# Patient Record
Sex: MALE | Race: WHITE | Employment: FULL TIME | URBAN - METROPOLITAN AREA
[De-identification: names, ages, dates, MRNs, and addresses within clinical notes are randomized per-mention and may not be internally consistent; named-entity substitution may affect disease eponyms.]

---

## 2017-03-02 RX ORDER — ZOLPIDEM TARTRATE 10 MG/1
TABLET ORAL
Qty: 30 TABLET | Refills: 0 | Status: SHIPPED | OUTPATIENT
Start: 2017-03-02 | End: 2018-11-07

## 2017-04-13 ENCOUNTER — OFFICE VISIT (OUTPATIENT)
Dept: INTERNAL MEDICINE CLINIC | Facility: CLINIC | Age: 42
End: 2017-04-13

## 2017-04-13 VITALS
HEART RATE: 92 BPM | BODY MASS INDEX: 42.42 KG/M2 | SYSTOLIC BLOOD PRESSURE: 184 MMHG | OXYGEN SATURATION: 98 % | TEMPERATURE: 99 F | HEIGHT: 71 IN | DIASTOLIC BLOOD PRESSURE: 88 MMHG | RESPIRATION RATE: 16 BRPM | WEIGHT: 303 LBS

## 2017-04-13 DIAGNOSIS — F41.9 ANXIETY: ICD-10-CM

## 2017-04-13 DIAGNOSIS — I10 ESSENTIAL HYPERTENSION, BENIGN: Primary | ICD-10-CM

## 2017-04-13 DIAGNOSIS — N52.9 ERECTILE DYSFUNCTION, UNSPECIFIED ERECTILE DYSFUNCTION TYPE: ICD-10-CM

## 2017-04-13 DIAGNOSIS — G47.00 INSOMNIA, UNSPECIFIED TYPE: ICD-10-CM

## 2017-04-13 PROBLEM — C62.90 TESTICLE CANCER (HCC): Status: ACTIVE | Noted: 2017-04-13

## 2017-04-13 PROCEDURE — 99214 OFFICE O/P EST MOD 30 MIN: CPT | Performed by: FAMILY MEDICINE

## 2017-04-13 RX ORDER — OLMESARTAN MEDOXOMIL AND HYDROCHLOROTHIAZIDE 20/12.5 20; 12.5 MG/1; MG/1
1 TABLET ORAL
Qty: 30 TABLET | Refills: 2 | Status: SHIPPED | OUTPATIENT
Start: 2017-04-13 | End: 2017-07-06

## 2017-04-13 NOTE — PROGRESS NOTES
HPI:    Patient ID: Bernarda Berumen is a 35year old male.     HPI  Off the diovan hct   1 week d/t ED   The benicar did not have this effect   Stopped d/t cost   alos having issues w anxiety per wife    Short fused    More hyper   Sleep is affected   W (primary encounter diagnosis)  Plan: unsure control   DC diovan hct    Use benicar 20-12.5   Maria Victoria 1 mo    (N52.9) Erectile dysfunction, unspecified erectile dysfunction type  Plan: hopefully gets better w above med changes      (F41.9) Anxiety  Plan: d/w p

## 2017-05-10 ENCOUNTER — OFFICE VISIT (OUTPATIENT)
Dept: INTERNAL MEDICINE CLINIC | Facility: CLINIC | Age: 42
End: 2017-05-10

## 2017-05-10 VITALS
WEIGHT: 304 LBS | TEMPERATURE: 99 F | OXYGEN SATURATION: 97 % | BODY MASS INDEX: 42 KG/M2 | DIASTOLIC BLOOD PRESSURE: 82 MMHG | HEART RATE: 95 BPM | SYSTOLIC BLOOD PRESSURE: 122 MMHG | RESPIRATION RATE: 16 BRPM

## 2017-05-10 DIAGNOSIS — I10 ESSENTIAL HYPERTENSION, BENIGN: Primary | ICD-10-CM

## 2017-05-10 DIAGNOSIS — R06.83 SNORING: ICD-10-CM

## 2017-05-10 PROCEDURE — 99213 OFFICE O/P EST LOW 20 MIN: CPT | Performed by: FAMILY MEDICINE

## 2017-05-10 NOTE — PROGRESS NOTES
HPI:    Patient ID: Joanna Sparks. is a 35year old male. HPI  Joanna Sparks. is a 35year old male. HPI:   Patient presents for recheck of his hypertension.  Pt has been taking medications as instructed, no medication side effects, home BP tunnel syndrome on both sides      hands, R hand repaired through surgery, L hand unrepaired          Past Surgical History    BACK SURGERY  10/11    Comment Lumbar area, treated with needle and heat 10/11    OTHER SURGICAL HISTORY      Comment R carpal, N Take 1 tablet (40 mg total) by mouth every morning before breakfast. Disp: 30 tablet Rfl: 3   Naproxen Sodium (ALEVE) 220 MG Oral Cap Take 220 mg by mouth daily as needed.  Disp:  Rfl:    Testosterone cypionate (DEPOTESTOTERONE) 200 MG/ML Intramuscular Solu

## 2017-07-06 ENCOUNTER — TELEPHONE (OUTPATIENT)
Dept: INTERNAL MEDICINE CLINIC | Facility: CLINIC | Age: 42
End: 2017-07-06

## 2017-07-06 RX ORDER — OLMESARTAN MEDOXOMIL AND HYDROCHLOROTHIAZIDE 20/12.5 20; 12.5 MG/1; MG/1
1 TABLET ORAL
Qty: 90 TABLET | Refills: 0 | Status: SHIPPED | OUTPATIENT
Start: 2017-07-06 | End: 2017-09-30

## 2017-07-06 NOTE — TELEPHONE ENCOUNTER
Needs 90 day supply from mail order for ins to cover    Olmesartan Medoxomil-HCTZ (BENICAR HCT) 20-12.5 MG Oral Tab    Also is going out of town before mail order will arrive - Are samples available?

## 2017-10-04 RX ORDER — OLMESARTAN MEDOXOMIL AND HYDROCHLOROTHIAZIDE 20/12.5 20; 12.5 MG/1; MG/1
TABLET ORAL
Qty: 30 TABLET | Refills: 0 | Status: SHIPPED | OUTPATIENT
Start: 2017-10-04 | End: 2017-10-05 | Stop reason: CLARIF

## 2017-10-05 RX ORDER — OLMESARTAN MEDOXOMIL AND HYDROCHLOROTHIAZIDE 20/12.5 20; 12.5 MG/1; MG/1
1 TABLET ORAL
Qty: 90 TABLET | Refills: 0 | OUTPATIENT
Start: 2017-10-05

## 2017-10-09 ENCOUNTER — HOSPITAL ENCOUNTER (OUTPATIENT)
Dept: GENERAL RADIOLOGY | Age: 42
Discharge: HOME | End: 2017-10-09
Attending: UROLOGY
Payer: COMMERCIAL

## 2017-10-09 DIAGNOSIS — C62.92: ICD-10-CM

## 2017-10-09 PROCEDURE — 71020 XR CHEST PA + LAT CHEST (CPT=71020): CPT | Performed by: UROLOGY

## 2017-10-18 RX ORDER — OLMESARTAN MEDOXOMIL AND HYDROCHLOROTHIAZIDE 20/12.5 20; 12.5 MG/1; MG/1
TABLET ORAL
Qty: 90 TABLET | OUTPATIENT
Start: 2017-10-18

## 2017-10-30 ENCOUNTER — TELEPHONE (OUTPATIENT)
Dept: INTERNAL MEDICINE CLINIC | Facility: CLINIC | Age: 42
End: 2017-10-30

## 2017-10-30 NOTE — TELEPHONE ENCOUNTER
Patient called today to cancel his 3:30 appointment.   Explained our no show policy but since this was his first no show he would not be charged $50

## 2017-11-09 ENCOUNTER — OFFICE VISIT (OUTPATIENT)
Dept: INTERNAL MEDICINE CLINIC | Facility: CLINIC | Age: 42
End: 2017-11-09

## 2017-11-09 VITALS
OXYGEN SATURATION: 98 % | WEIGHT: 306.5 LBS | RESPIRATION RATE: 20 BRPM | TEMPERATURE: 98 F | DIASTOLIC BLOOD PRESSURE: 80 MMHG | HEIGHT: 70 IN | BODY MASS INDEX: 43.88 KG/M2 | HEART RATE: 81 BPM | SYSTOLIC BLOOD PRESSURE: 138 MMHG

## 2017-11-09 DIAGNOSIS — R73.9 ELEVATED BLOOD SUGAR: ICD-10-CM

## 2017-11-09 DIAGNOSIS — K21.9 GASTROESOPHAGEAL REFLUX DISEASE WITHOUT ESOPHAGITIS: ICD-10-CM

## 2017-11-09 DIAGNOSIS — I10 ESSENTIAL HYPERTENSION, BENIGN: Primary | ICD-10-CM

## 2017-11-09 PROCEDURE — 99213 OFFICE O/P EST LOW 20 MIN: CPT | Performed by: FAMILY MEDICINE

## 2017-11-09 RX ORDER — VALSARTAN AND HYDROCHLOROTHIAZIDE 320; 12.5 MG/1; MG/1
1 TABLET, FILM COATED ORAL DAILY
Qty: 90 TABLET | Refills: 1 | Status: SHIPPED | OUTPATIENT
Start: 2017-11-09 | End: 2018-05-09

## 2017-11-09 RX ORDER — PANTOPRAZOLE SODIUM 40 MG/1
40 TABLET, DELAYED RELEASE ORAL
Qty: 90 TABLET | Refills: 1 | Status: SHIPPED | OUTPATIENT
Start: 2017-11-09 | End: 2018-05-14

## 2017-11-09 NOTE — PROGRESS NOTES
HPI:    Patient ID: Ceferino Stratton. is a 38year old male. HPI  Ceferino Stratton. is a 38year old male. HPI:   Patient presents for recheck of his hypertension.  Pt has been taking medications as instructed, no medication side effects, home BP 4/6/2013   • Low testosterone    • Unspecified essential hypertension       Past Surgical History:  10/11: BACK SURGERY      Comment: Lumbar area, treated with needle and heat                10/11  10/2016: ORCHIECTOMY   Left      Comment: Herminio Mixon Pantoprazole Sodium 40 MG Oral Tab EC Take 1 tablet (40 mg total) by mouth every morning before breakfast. Disp: 90 tablet Rfl: 1   ZOLPIDEM TARTRATE 10 MG Oral Tab TAKE 1 TABLET BY MOUTH NIGHTLY AT BEDTIME FOR SLEEP Disp: 30 tablet Rfl: 0   Testosterone

## 2018-03-01 LAB
ALBUMIN/GLOBULIN RATIO: 2.5 (CALC) (ref 1–2.5)
ALBUMIN: 5 G/DL (ref 3.6–5.1)
ALKALINE PHOSPHATASE: 70 U/L (ref 40–115)
ALT: 49 U/L (ref 9–46)
AST: 26 U/L (ref 10–40)
BILIRUBIN, TOTAL: 0.8 MG/DL (ref 0.2–1.2)
BUN: 16 MG/DL (ref 7–25)
CALCIUM: 9.4 MG/DL (ref 8.6–10.3)
CARBON DIOXIDE: 28 MMOL/L (ref 20–31)
CHLORIDE: 101 MMOL/L (ref 98–110)
CHOL/HDLC RATIO: 6.3 (CALC)
CHOLESTEROL, TOTAL: 201 MG/DL
CREATININE: 1.24 MG/DL (ref 0.6–1.35)
EGFR IF AFRICN AM: 81 ML/MIN/1.73M2
EGFR IF NONAFRICN AM: 70 ML/MIN/1.73M2
GLOBULIN: 2 G/DL (CALC) (ref 1.9–3.7)
GLUCOSE: 80 MG/DL (ref 65–99)
HDL CHOLESTEROL: 32 MG/DL
LDL-CHOLESTEROL: 144 MG/DL (CALC)
NON-HDL CHOLESTEROL: 169 MG/DL (CALC)
POTASSIUM: 4.3 MMOL/L (ref 3.5–5.3)
PROTEIN, TOTAL: 7 G/DL (ref 6.1–8.1)
SODIUM: 139 MMOL/L (ref 135–146)
TRIGLYCERIDES: 123 MG/DL

## 2018-04-05 ENCOUNTER — HOSPITAL ENCOUNTER (OUTPATIENT)
Dept: GENERAL RADIOLOGY | Age: 43
Discharge: HOME | End: 2018-04-05
Attending: UROLOGY
Payer: COMMERCIAL

## 2018-04-05 DIAGNOSIS — D29.22: ICD-10-CM

## 2018-04-05 PROCEDURE — 71046 X-RAY EXAM CHEST 2 VIEWS: CPT | Performed by: UROLOGY

## 2018-05-09 ENCOUNTER — OFFICE VISIT (OUTPATIENT)
Dept: INTERNAL MEDICINE CLINIC | Facility: CLINIC | Age: 43
End: 2018-05-09

## 2018-05-09 VITALS
DIASTOLIC BLOOD PRESSURE: 74 MMHG | OXYGEN SATURATION: 99 % | WEIGHT: 269 LBS | BODY MASS INDEX: 39 KG/M2 | TEMPERATURE: 98 F | RESPIRATION RATE: 16 BRPM | SYSTOLIC BLOOD PRESSURE: 122 MMHG | HEART RATE: 98 BPM

## 2018-05-09 DIAGNOSIS — I10 ESSENTIAL HYPERTENSION, BENIGN: Primary | ICD-10-CM

## 2018-05-09 DIAGNOSIS — E66.9 OBESITY (BMI 30-39.9): ICD-10-CM

## 2018-05-09 PROCEDURE — 99213 OFFICE O/P EST LOW 20 MIN: CPT | Performed by: FAMILY MEDICINE

## 2018-05-09 RX ORDER — VALSARTAN AND HYDROCHLOROTHIAZIDE 320; 12.5 MG/1; MG/1
1 TABLET, FILM COATED ORAL DAILY
Qty: 90 TABLET | Refills: 1 | Status: SHIPPED | OUTPATIENT
Start: 2018-05-09 | End: 2018-11-07

## 2018-05-09 NOTE — PROGRESS NOTES
HPI:    Patient ID: Celio Mcneill. is a 38year old male. HPI  Celio Mcneill. is a 38year old male. HPI:   Patient presents for recheck of his hypertension.  Pt has been taking medications as instructed, no medication side effects, no home Diagnosis Date   • Carpal tunnel syndrome on both sides     hands, R hand repaired through surgery, L hand unrepaired   • Cellulitis     arm   • Elevated blood sugar 10/9/2013   • Essential hypertension, benign 4/6/2013   • Low testosterone    • Unspecif Systems         Current Outpatient Prescriptions:  Valsartan-Hydrochlorothiazide 320-12.5 MG Oral Tab Take 1 tablet by mouth daily.  Disp: 90 tablet Rfl: 1   Testosterone cypionate (DEPOTESTOTERONE) 200 MG/ML Intramuscular Solution Inject 200 mg into the mu

## 2018-05-14 RX ORDER — PANTOPRAZOLE SODIUM 40 MG/1
TABLET, DELAYED RELEASE ORAL
Qty: 90 TABLET | Refills: 1 | Status: SHIPPED | OUTPATIENT
Start: 2018-05-14 | End: 2018-11-07

## 2018-10-09 ENCOUNTER — HOSPITAL ENCOUNTER (OUTPATIENT)
Dept: GENERAL RADIOLOGY | Age: 43
Discharge: HOME | End: 2018-10-09
Attending: UROLOGY
Payer: COMMERCIAL

## 2018-10-09 DIAGNOSIS — Z08 ENCOUNTER FOR FOLLOW-UP SURVEILLANCE OF TESTICULAR CANCER: ICD-10-CM

## 2018-10-09 DIAGNOSIS — Z85.47 ENCOUNTER FOR FOLLOW-UP SURVEILLANCE OF TESTICULAR CANCER: ICD-10-CM

## 2018-10-09 PROCEDURE — 71046 X-RAY EXAM CHEST 2 VIEWS: CPT | Performed by: UROLOGY

## 2018-11-05 RX ORDER — VALSARTAN AND HYDROCHLOROTHIAZIDE 320; 12.5 MG/1; MG/1
TABLET, FILM COATED ORAL
Qty: 90 TABLET | Refills: 1 | OUTPATIENT
Start: 2018-11-05

## 2018-11-07 ENCOUNTER — OFFICE VISIT (OUTPATIENT)
Dept: INTERNAL MEDICINE CLINIC | Facility: CLINIC | Age: 43
End: 2018-11-07
Payer: COMMERCIAL

## 2018-11-07 VITALS
HEIGHT: 70 IN | WEIGHT: 256 LBS | TEMPERATURE: 98 F | HEART RATE: 88 BPM | BODY MASS INDEX: 36.65 KG/M2 | SYSTOLIC BLOOD PRESSURE: 110 MMHG | DIASTOLIC BLOOD PRESSURE: 78 MMHG

## 2018-11-07 DIAGNOSIS — K21.9 GASTROESOPHAGEAL REFLUX DISEASE WITHOUT ESOPHAGITIS: ICD-10-CM

## 2018-11-07 DIAGNOSIS — E66.9 OBESITY (BMI 30-39.9): ICD-10-CM

## 2018-11-07 DIAGNOSIS — R73.9 ELEVATED BLOOD SUGAR: ICD-10-CM

## 2018-11-07 DIAGNOSIS — I10 ESSENTIAL HYPERTENSION, BENIGN: Primary | ICD-10-CM

## 2018-11-07 PROCEDURE — 99214 OFFICE O/P EST MOD 30 MIN: CPT | Performed by: FAMILY MEDICINE

## 2018-11-07 RX ORDER — ZOLPIDEM TARTRATE 10 MG/1
TABLET ORAL
Qty: 30 TABLET | Refills: 0 | Status: SHIPPED | OUTPATIENT
Start: 2018-11-07 | End: 2019-05-08

## 2018-11-07 RX ORDER — PANTOPRAZOLE SODIUM 40 MG/1
TABLET, DELAYED RELEASE ORAL
Qty: 90 TABLET | Refills: 1 | Status: SHIPPED | OUTPATIENT
Start: 2018-11-07 | End: 2019-05-09

## 2018-11-07 RX ORDER — OLMESARTAN MEDOXOMIL AND HYDROCHLOROTHIAZIDE 40/25 40; 25 MG/1; MG/1
1 TABLET ORAL DAILY
Qty: 90 TABLET | Refills: 1 | Status: SHIPPED | OUTPATIENT
Start: 2018-11-07 | End: 2019-02-04 | Stop reason: ALTCHOICE

## 2018-11-07 NOTE — PROGRESS NOTES
HPI:    Patient ID: Leanna Recinos. is a 37year old male. HPI  Leanna Recinos. is a 37year old male. HPI:   Patient presents for recheck of his hypertension.  Pt has been taking medications as instructed, no medication side effects, home BP on both sides     hands, R hand repaired through surgery, L hand unrepaired   • Cellulitis     arm   • Elevated blood sugar 10/9/2013   • Essential hypertension, benign 4/6/2013   • Low testosterone    • Unspecified essential hypertension       Past Surgic BEDTIME FOR SLEEP Disp: 30 tablet Rfl: 0   Valsartan-Hydrochlorothiazide 320-12.5 MG Oral Tab Take 1 tablet by mouth daily.  Disp: 90 tablet Rfl: 1   Testosterone cypionate (DEPOTESTOTERONE) 200 MG/ML Intramuscular Solution Inject 200 mg into the muscle See

## 2019-01-24 ENCOUNTER — HOSPITAL ENCOUNTER (OUTPATIENT)
Age: 44
Discharge: HOME | End: 2019-01-24
Attending: EMERGENCY MEDICINE
Payer: COMMERCIAL

## 2019-01-24 VITALS
OXYGEN SATURATION: 99 % | SYSTOLIC BLOOD PRESSURE: 152 MMHG | RESPIRATION RATE: 20 BRPM | HEART RATE: 85 BPM | DIASTOLIC BLOOD PRESSURE: 64 MMHG | TEMPERATURE: 98 F

## 2019-01-24 DIAGNOSIS — S61.309A PARTIAL AVULSION OF FINGERNAIL, INITIAL ENCOUNTER: Primary | ICD-10-CM

## 2019-01-24 DIAGNOSIS — S61.313A LACERATION OF LEFT MIDDLE FINGER WITHOUT FOREIGN BODY WITH DAMAGE TO NAIL, INITIAL ENCOUNTER: ICD-10-CM

## 2019-01-24 PROCEDURE — 99203 OFFICE O/P NEW LOW 30 MIN: CPT

## 2019-01-24 PROCEDURE — 11760 REPAIR OF NAIL BED: CPT

## 2019-01-24 PROCEDURE — 99213 OFFICE O/P EST LOW 20 MIN: CPT

## 2019-01-24 PROCEDURE — 90471 IMMUNIZATION ADMIN: CPT

## 2019-01-25 NOTE — ED PROVIDER NOTES
Patient Seen in: [address] [facility]    History   Patient presents with:  Finger Injury    Stated Complaint: Left/ finger laceration    HPI  Patient is a pleasant 40-year-old male who presents after dropping a weight on his left third fi SpO2 99%         Physical Exam    General: Well-appearing patient of stated age resting comfortably  HEENT: Normocephalic atraumatic. Nonicteric sclera.   Moist mucous membranes  Lungs: No tachypnea  Cardiac: No tachycardia  Skin: No rashes, pallor  Neuro:

## 2019-01-25 NOTE — ED INITIAL ASSESSMENT (HPI)
Dropped a weight on to his left third finger. Fingernail broken through nail bed. Bleeding controlled.

## 2019-02-04 ENCOUNTER — TELEPHONE (OUTPATIENT)
Dept: INTERNAL MEDICINE CLINIC | Facility: CLINIC | Age: 44
End: 2019-02-04

## 2019-02-04 RX ORDER — HYDROCHLOROTHIAZIDE 25 MG/1
25 TABLET ORAL DAILY
Qty: 90 TABLET | Refills: 0 | Status: SHIPPED | OUTPATIENT
Start: 2019-02-04 | End: 2019-05-02

## 2019-02-04 RX ORDER — OLMESARTAN MEDOXOMIL 40 MG/1
40 TABLET ORAL DAILY
Qty: 90 TABLET | Refills: 0 | Status: SHIPPED | OUTPATIENT
Start: 2019-02-04 | End: 2019-02-05 | Stop reason: RX

## 2019-02-04 NOTE — TELEPHONE ENCOUNTER
Patient stated that his prescription for Olmesartan Medoxomil-HCTZ 40-25 MG Oral Tab is on back order. Patient wants to know if Dr. Parth Smith can prescribe an alternative. Needs to be sent to [facility], [address] [facility] [address]  [address]

## 2019-02-05 RX ORDER — IRBESARTAN 300 MG/1
300 TABLET ORAL DAILY
Qty: 90 TABLET | Refills: 0 | Status: SHIPPED | OUTPATIENT
Start: 2019-02-05 | End: 2019-04-29

## 2019-02-05 NOTE — TELEPHONE ENCOUNTER
Alternative RX sent. Confirmed RX was received with pharmacy but she stated that olmesartan in every strength is on  back order. Please advise.

## 2019-02-28 ENCOUNTER — HOSPITAL ENCOUNTER (OUTPATIENT)
Dept: GENERAL RADIOLOGY | Age: 44
Discharge: HOME | End: 2019-02-28
Attending: UROLOGY
Payer: COMMERCIAL

## 2019-02-28 DIAGNOSIS — C62.92 MALIGNANT NEOPLASM OF LEFT TESTIS (HCC): ICD-10-CM

## 2019-03-21 ENCOUNTER — HOSPITAL ENCOUNTER (OUTPATIENT)
Dept: GENERAL RADIOLOGY | Age: 44
Discharge: HOME | End: 2019-03-21
Attending: UROLOGY
Payer: COMMERCIAL

## 2019-03-21 PROCEDURE — 71046 X-RAY EXAM CHEST 2 VIEWS: CPT | Performed by: UROLOGY

## 2019-04-30 RX ORDER — IRBESARTAN 300 MG/1
TABLET ORAL
Qty: 90 TABLET | Refills: 0 | Status: SHIPPED | OUTPATIENT
Start: 2019-04-30 | End: 2019-08-09

## 2019-04-30 NOTE — TELEPHONE ENCOUNTER
Irbesartan last filled 2/5/19 #90    LOV   11/7/18    Last CMP ordered 11/7/18 - never completed    Upcoming appt 5/8/19

## 2019-05-04 RX ORDER — IRBESARTAN 300 MG/1
TABLET ORAL
Qty: 90 TABLET | Refills: 0 | OUTPATIENT
Start: 2019-05-04

## 2019-05-04 RX ORDER — HYDROCHLOROTHIAZIDE 25 MG/1
TABLET ORAL
Qty: 90 TABLET | Refills: 0 | Status: SHIPPED | OUTPATIENT
Start: 2019-05-04 | End: 2019-08-02

## 2019-05-08 ENCOUNTER — OFFICE VISIT (OUTPATIENT)
Dept: INTERNAL MEDICINE CLINIC | Facility: CLINIC | Age: 44
End: 2019-05-08
Payer: COMMERCIAL

## 2019-05-08 VITALS
HEIGHT: 69.75 IN | BODY MASS INDEX: 37.72 KG/M2 | WEIGHT: 260.5 LBS | HEART RATE: 80 BPM | DIASTOLIC BLOOD PRESSURE: 76 MMHG | SYSTOLIC BLOOD PRESSURE: 120 MMHG | TEMPERATURE: 98 F

## 2019-05-08 DIAGNOSIS — E66.9 OBESITY (BMI 30-39.9): ICD-10-CM

## 2019-05-08 DIAGNOSIS — I10 ESSENTIAL HYPERTENSION, BENIGN: Primary | ICD-10-CM

## 2019-05-08 PROCEDURE — 99213 OFFICE O/P EST LOW 20 MIN: CPT | Performed by: FAMILY MEDICINE

## 2019-05-08 RX ORDER — PANTOPRAZOLE SODIUM 40 MG/1
TABLET, DELAYED RELEASE ORAL
Qty: 90 TABLET | Refills: 1 | Status: CANCELLED | OUTPATIENT
Start: 2019-05-08

## 2019-05-08 RX ORDER — ZOLPIDEM TARTRATE 10 MG/1
TABLET ORAL
Qty: 30 TABLET | Refills: 1 | Status: SHIPPED | OUTPATIENT
Start: 2019-05-08 | End: 2019-08-09

## 2019-05-08 RX ORDER — ZOLPIDEM TARTRATE 10 MG/1
TABLET ORAL
Qty: 30 TABLET | Refills: 0 | Status: CANCELLED | OUTPATIENT
Start: 2019-05-08

## 2019-05-08 NOTE — PROGRESS NOTES
HPI:    Patient ID: Bernarda Goss. is a 37year old male. HPI  Bernarda Goss. is a 37year old male. HPI:   Patient presents for recheck of his hypertension.  Pt has been taking medications as instructed, no medication side effects, no home 10/9/2013   • Essential hypertension, benign 4/6/2013   • Low testosterone    • Unspecified essential hypertension       Past Surgical History:   Procedure Laterality Date   • BACK SURGERY  10/11    Lumbar area, treated with needle and heat 10/11   • ORCHI 25 MG Oral Tab TAKE 1 TABLET BY MOUTH EVERY DAY Disp: 90 tablet Rfl: 0   IRBESARTAN 300 MG Oral Tab TAKE 1 TABLET BY MOUTH EVERY DAY Disp: 90 tablet Rfl: 0   Pantoprazole Sodium 40 MG Oral Tab EC TAKE 1 TABLET BY MOUTH EVERY MORNING BEFORE BREAKFAST Disp:

## 2019-05-09 RX ORDER — PANTOPRAZOLE SODIUM 40 MG/1
TABLET, DELAYED RELEASE ORAL
Qty: 90 TABLET | Refills: 1 | Status: SHIPPED | OUTPATIENT
Start: 2019-05-09 | End: 2019-11-06

## 2019-08-05 RX ORDER — HYDROCHLOROTHIAZIDE 25 MG/1
TABLET ORAL
Qty: 30 TABLET | Refills: 0 | Status: SHIPPED | OUTPATIENT
Start: 2019-08-05 | End: 2019-09-09

## 2019-08-09 ENCOUNTER — TELEPHONE (OUTPATIENT)
Dept: INTERNAL MEDICINE CLINIC | Facility: CLINIC | Age: 44
End: 2019-08-09

## 2019-08-12 RX ORDER — IRBESARTAN 300 MG/1
TABLET ORAL
Qty: 30 TABLET | Refills: 0 | Status: SHIPPED | OUTPATIENT
Start: 2019-08-12 | End: 2019-08-15

## 2019-08-12 RX ORDER — ZOLPIDEM TARTRATE 10 MG/1
TABLET ORAL
Qty: 30 TABLET | Refills: 0 | Status: SHIPPED | OUTPATIENT
Start: 2019-08-12 | End: 2019-09-17

## 2019-08-12 NOTE — TELEPHONE ENCOUNTER
Spoke with patient and he was reluctant to have labs done. . Not understanding the importance of having them done, just because he took BP medication.  Alexandra Me \"It was his choice to have them done if he wanted to or not\"    Patient was educated on importance

## 2019-08-15 RX ORDER — CANDESARTAN 32 MG/1
32 TABLET ORAL DAILY
Qty: 30 TABLET | Refills: 0 | COMMUNITY
Start: 2019-08-15 | End: 2019-09-09

## 2019-08-15 NOTE — TELEPHONE ENCOUNTER
[facility] calling in, seeking an alternative for IRBESARTAN 300 MG Oral Tab, due to it being on back order. Please send alterative as soon as possible for patient's needs.

## 2019-09-07 LAB
ALBUMIN/GLOBULIN RATIO: 2.2 (CALC) (ref 1–2.5)
ALBUMIN: 4.8 G/DL (ref 3.6–5.1)
ALKALINE PHOSPHATASE: 73 U/L (ref 40–115)
ALT: 32 U/L (ref 9–46)
AST: 32 U/L (ref 10–40)
BILIRUBIN, TOTAL: 1 MG/DL (ref 0.2–1.2)
BUN/CREATININE RATIO: 12 (CALC) (ref 6–22)
BUN: 18 MG/DL (ref 7–25)
CALCIUM: 9.7 MG/DL (ref 8.6–10.3)
CARBON DIOXIDE: 29 MMOL/L (ref 20–32)
CHLORIDE: 97 MMOL/L (ref 98–110)
CHOL/HDLC RATIO: 4.1 (CALC)
CHOLESTEROL, TOTAL: 199 MG/DL
CREATININE: 1.49 MG/DL (ref 0.6–1.35)
EGFR IF AFRICN AM: 65 ML/MIN/1.73M2
EGFR IF NONAFRICN AM: 56 ML/MIN/1.73M2
GLOBULIN: 2.2 G/DL (CALC) (ref 1.9–3.7)
GLUCOSE: 114 MG/DL (ref 65–99)
HDL CHOLESTEROL: 49 MG/DL
HEMOGLOBIN A1C: 5.8 % OF TOTAL HGB
LDL-CHOLESTEROL: 122 MG/DL (CALC)
NON-HDL CHOLESTEROL: 150 MG/DL (CALC)
POTASSIUM: 4.4 MMOL/L (ref 3.5–5.3)
PROTEIN, TOTAL: 7 G/DL (ref 6.1–8.1)
SODIUM: 138 MMOL/L (ref 135–146)
TRIGLYCERIDES: 162 MG/DL

## 2019-09-10 RX ORDER — HYDROCHLOROTHIAZIDE 25 MG/1
TABLET ORAL
Qty: 30 TABLET | Refills: 0 | Status: SHIPPED | OUTPATIENT
Start: 2019-09-10 | End: 2019-10-05

## 2019-09-10 RX ORDER — CANDESARTAN 32 MG/1
TABLET ORAL
Qty: 30 TABLET | Refills: 0 | Status: SHIPPED | OUTPATIENT
Start: 2019-09-10 | End: 2019-10-04

## 2019-09-18 RX ORDER — ZOLPIDEM TARTRATE 10 MG/1
TABLET ORAL
Qty: 30 TABLET | Refills: 0 | Status: SHIPPED | OUTPATIENT
Start: 2019-09-18 | End: 2019-10-28

## 2019-09-18 NOTE — TELEPHONE ENCOUNTER
Zolpidem Tartrate 10 Mg Oral Tab    Last OV relevant to medication: 5/8/2019    Last refill date: 8/12/2019     #/refills: #30 w/ 0 refills     When pt was asked to return for OV: 6 months     Upcoming appt/reason: 10/28/2019

## 2019-10-02 ENCOUNTER — EXTERNAL RECORD (OUTPATIENT)
Dept: HEALTH INFORMATION MANAGEMENT | Facility: OTHER | Age: 44
End: 2019-10-02

## 2019-10-04 ENCOUNTER — TELEPHONE (OUTPATIENT)
Dept: INTERNAL MEDICINE CLINIC | Facility: CLINIC | Age: 44
End: 2019-10-04

## 2019-10-04 RX ORDER — CANDESARTAN 32 MG/1
TABLET ORAL
Qty: 30 TABLET | Refills: 0 | Status: SHIPPED | OUTPATIENT
Start: 2019-10-04 | End: 2019-10-08

## 2019-10-07 ENCOUNTER — TELEPHONE (OUTPATIENT)
Dept: INTERNAL MEDICINE CLINIC | Facility: CLINIC | Age: 44
End: 2019-10-07

## 2019-10-07 RX ORDER — HYDROCHLOROTHIAZIDE 25 MG/1
TABLET ORAL
Qty: 30 TABLET | Refills: 0 | Status: SHIPPED | OUTPATIENT
Start: 2019-10-07 | End: 2019-10-08

## 2019-10-07 NOTE — TELEPHONE ENCOUNTER
[facility] pharmacy called requesting 90 day supply due to insurance purposes  HYDROCHLOROTHIAZIDE 25 MG Oral Tab    [id number] [place], [place] - [address]  [address] [place] [address], [phone], [phone]

## 2019-10-08 RX ORDER — CANDESARTAN 32 MG/1
32 TABLET ORAL
Qty: 90 TABLET | Refills: 0 | Status: SHIPPED | OUTPATIENT
Start: 2019-10-08 | End: 2020-01-11

## 2019-10-08 RX ORDER — HYDROCHLOROTHIAZIDE 25 MG/1
25 TABLET ORAL
Qty: 90 TABLET | Refills: 0 | Status: SHIPPED | OUTPATIENT
Start: 2019-10-08 | End: 2020-01-11

## 2019-10-28 ENCOUNTER — OFFICE VISIT (OUTPATIENT)
Dept: INTERNAL MEDICINE CLINIC | Facility: CLINIC | Age: 44
End: 2019-10-28
Payer: COMMERCIAL

## 2019-10-28 VITALS
DIASTOLIC BLOOD PRESSURE: 84 MMHG | RESPIRATION RATE: 18 BRPM | HEIGHT: 69.75 IN | TEMPERATURE: 98 F | WEIGHT: 277 LBS | HEART RATE: 94 BPM | SYSTOLIC BLOOD PRESSURE: 118 MMHG | BODY MASS INDEX: 40.1 KG/M2 | OXYGEN SATURATION: 97 %

## 2019-10-28 DIAGNOSIS — E66.9 OBESITY (BMI 30-39.9): ICD-10-CM

## 2019-10-28 DIAGNOSIS — R73.9 ELEVATED BLOOD SUGAR: ICD-10-CM

## 2019-10-28 DIAGNOSIS — I10 ESSENTIAL HYPERTENSION, BENIGN: Primary | ICD-10-CM

## 2019-10-28 PROCEDURE — 99214 OFFICE O/P EST MOD 30 MIN: CPT | Performed by: FAMILY MEDICINE

## 2019-10-28 RX ORDER — ZOLPIDEM TARTRATE 10 MG/1
TABLET ORAL
Qty: 30 TABLET | Refills: 0 | Status: CANCELLED | OUTPATIENT
Start: 2019-10-28

## 2019-10-28 RX ORDER — ZOLPIDEM TARTRATE 10 MG/1
TABLET ORAL
Qty: 30 TABLET | Refills: 2 | Status: SHIPPED | OUTPATIENT
Start: 2019-10-28 | End: 2020-02-21

## 2019-11-06 RX ORDER — PANTOPRAZOLE SODIUM 40 MG/1
TABLET, DELAYED RELEASE ORAL
Qty: 90 TABLET | Refills: 1 | Status: SHIPPED | OUTPATIENT
Start: 2019-11-06 | End: 2020-04-29

## 2019-11-06 NOTE — TELEPHONE ENCOUNTER
PANTOPRAZOLE SODIUM 40 MG Oral Tab EC    Last OV relevant to medication: 10/28/2019  Last refill date: 5/9/2019     #/refills: #90 w/ 1 refills  When pt was asked to return for OV: 6 months   Upcoming appt/reason: 4/20/2020

## 2019-11-20 ENCOUNTER — HOSPITAL ENCOUNTER (OUTPATIENT)
Dept: CT IMAGING | Facility: HOSPITAL | Age: 44
Discharge: HOME | End: 2019-11-20
Attending: FAMILY MEDICINE

## 2019-11-20 DIAGNOSIS — Z13.9 ENCOUNTER FOR SCREENING: ICD-10-CM

## 2020-01-11 RX ORDER — CANDESARTAN 32 MG/1
32 TABLET ORAL
Qty: 90 TABLET | Refills: 0 | Status: SHIPPED | OUTPATIENT
Start: 2020-01-11 | End: 2020-04-15

## 2020-01-11 RX ORDER — HYDROCHLOROTHIAZIDE 25 MG/1
TABLET ORAL
Qty: 90 TABLET | Refills: 0 | Status: SHIPPED | OUTPATIENT
Start: 2020-01-11 | End: 2020-04-15

## 2020-01-11 NOTE — TELEPHONE ENCOUNTER
HYDROCHLOROTHIAZIDE 25 MG Oral Tab    Passed Protocol    Last OV relevant to medication: 10/28/2019  Last refill date: 10/8/2019     #/refills: #90 w/ 0 refills   When pt was asked to return for OV: 6 months   Upcoming appt/reason: 4/20/2020  Lab Results

## 2020-02-20 NOTE — TELEPHONE ENCOUNTER
[facility] calling in requesting a refill for RX Zolpidem Tartrate 10 MG Oral Tab    Pharmacy:  [facility], [address]  [address] [facility], [phone], [phone]

## 2020-02-21 RX ORDER — ZOLPIDEM TARTRATE 10 MG/1
TABLET ORAL
Qty: 30 TABLET | Refills: 2 | Status: SHIPPED | OUTPATIENT
Start: 2020-02-21 | End: 2020-06-08

## 2020-04-14 RX ORDER — CANDESARTAN 32 MG/1
TABLET ORAL
Qty: 90 TABLET | Refills: 0 | OUTPATIENT
Start: 2020-04-14

## 2020-04-14 RX ORDER — HYDROCHLOROTHIAZIDE 25 MG/1
TABLET ORAL
Qty: 90 TABLET | Refills: 0 | OUTPATIENT
Start: 2020-04-14

## 2020-04-14 NOTE — TELEPHONE ENCOUNTER
Future Appointments   Date Time Provider Aida Bethea   4/15/2020  9:30 AM CELIA Deleon [facility] [place]

## 2020-04-15 ENCOUNTER — VIRTUAL PHONE E/M (OUTPATIENT)
Dept: INTERNAL MEDICINE CLINIC | Facility: CLINIC | Age: 45
End: 2020-04-15
Payer: COMMERCIAL

## 2020-04-15 DIAGNOSIS — I10 ESSENTIAL HYPERTENSION, BENIGN: Primary | ICD-10-CM

## 2020-04-15 PROCEDURE — 99212 OFFICE O/P EST SF 10 MIN: CPT | Performed by: NURSE PRACTITIONER

## 2020-04-15 RX ORDER — CANDESARTAN 32 MG/1
32 TABLET ORAL
Qty: 90 TABLET | Refills: 0 | Status: SHIPPED | OUTPATIENT
Start: 2020-04-15 | End: 2020-07-08

## 2020-04-15 RX ORDER — HYDROCHLOROTHIAZIDE 25 MG/1
25 TABLET ORAL DAILY
Qty: 90 TABLET | Refills: 0 | Status: SHIPPED | OUTPATIENT
Start: 2020-04-15 | End: 2020-09-25

## 2020-04-15 NOTE — PROGRESS NOTES
Virtual Telephone Check-In    Benjy Harris. verbally consents to a Virtual/Telephone Check-In visit on 04/15/20. Patient understands and accepts financial responsibility for any deductible, co-insurance and/or co-pays associated with this service.

## 2020-04-29 RX ORDER — PANTOPRAZOLE SODIUM 40 MG/1
40 TABLET, DELAYED RELEASE ORAL
Qty: 90 TABLET | Refills: 0 | Status: SHIPPED | OUTPATIENT
Start: 2020-04-29 | End: 2020-07-28

## 2020-04-29 NOTE — TELEPHONE ENCOUNTER
Pantoprazole 40 mg  Last OV relevant to medication: 11-7-18  Last refill date: 11-6-19 #/refills: 1  When pt was asked to return for OV: 6 mo.   Upcoming appt/reason: 6-8-20  Recent labs: 9-6-19: CMP

## 2020-06-08 ENCOUNTER — OFFICE VISIT (OUTPATIENT)
Dept: INTERNAL MEDICINE CLINIC | Facility: CLINIC | Age: 45
End: 2020-06-08
Payer: COMMERCIAL

## 2020-06-08 VITALS
SYSTOLIC BLOOD PRESSURE: 145 MMHG | HEIGHT: 69.75 IN | BODY MASS INDEX: 41.05 KG/M2 | HEART RATE: 88 BPM | WEIGHT: 283.5 LBS | OXYGEN SATURATION: 98 % | TEMPERATURE: 98 F | DIASTOLIC BLOOD PRESSURE: 95 MMHG | RESPIRATION RATE: 18 BRPM

## 2020-06-08 DIAGNOSIS — I10 ESSENTIAL HYPERTENSION, BENIGN: Primary | ICD-10-CM

## 2020-06-08 DIAGNOSIS — E66.9 OBESITY (BMI 30-39.9): ICD-10-CM

## 2020-06-08 PROCEDURE — 99213 OFFICE O/P EST LOW 20 MIN: CPT | Performed by: FAMILY MEDICINE

## 2020-06-08 RX ORDER — ZOLPIDEM TARTRATE 10 MG/1
TABLET ORAL
Qty: 30 TABLET | Refills: 2 | Status: CANCELLED | OUTPATIENT
Start: 2020-06-08

## 2020-06-08 RX ORDER — ZOLPIDEM TARTRATE 10 MG/1
TABLET ORAL
Qty: 30 TABLET | Refills: 2 | Status: SHIPPED | OUTPATIENT
Start: 2020-06-08 | End: 2020-10-19

## 2020-06-08 NOTE — PROGRESS NOTES
Ross Marley. is a 53year old male. HPI:   Patient presents for recheck of his hypertension.  Pt has been taking medications as instructed, no medication side effects, home BP monitoring in the range of 130/70s    Has not ecercised in 3 months si Instructions. Every 10 days.         Past Medical History:   Diagnosis Date   • Carpal tunnel syndrome on both sides     hands, R hand repaired through surgery, L hand unrepaired   • Cellulitis     arm   • Elevated blood sugar 10/9/2013   • Essential hypert asked to return in September   .

## 2020-07-08 DIAGNOSIS — I10 ESSENTIAL HYPERTENSION, BENIGN: ICD-10-CM

## 2020-07-08 RX ORDER — CANDESARTAN 32 MG/1
TABLET ORAL
Qty: 90 TABLET | Refills: 0 | Status: SHIPPED | OUTPATIENT
Start: 2020-07-08 | End: 2021-01-04

## 2020-07-08 NOTE — TELEPHONE ENCOUNTER
Candesartan 32 mg  Last OV relevant to medication: 6-8-20  Last refill date: 4-15-20 #/refills: 0  When pt was asked to return for OV: Sept.  Upcoming appt/reason: 9-9-20  Recent labs: 9-6-19: CMP

## 2020-07-28 RX ORDER — PANTOPRAZOLE SODIUM 40 MG/1
TABLET, DELAYED RELEASE ORAL
Qty: 90 TABLET | Refills: 0 | Status: SHIPPED | OUTPATIENT
Start: 2020-07-28

## 2020-07-28 NOTE — TELEPHONE ENCOUNTER
No Protocol     Last refill:  4/29/2020 Pantoprazole 40 mg #90 NR    LOV:   6/8/2020 Dr Grazyna Tsang RTC 3 months  FOV scheduled 9/9/2020

## 2020-09-09 ENCOUNTER — OFFICE VISIT (OUTPATIENT)
Dept: INTERNAL MEDICINE CLINIC | Facility: CLINIC | Age: 45
End: 2020-09-09
Payer: COMMERCIAL

## 2020-09-09 VITALS
HEART RATE: 98 BPM | BODY MASS INDEX: 40.1 KG/M2 | RESPIRATION RATE: 18 BRPM | WEIGHT: 277 LBS | OXYGEN SATURATION: 98 % | HEIGHT: 69.75 IN | DIASTOLIC BLOOD PRESSURE: 88 MMHG | SYSTOLIC BLOOD PRESSURE: 126 MMHG

## 2020-09-09 DIAGNOSIS — I10 ESSENTIAL HYPERTENSION, BENIGN: Primary | ICD-10-CM

## 2020-09-09 PROCEDURE — 3008F BODY MASS INDEX DOCD: CPT | Performed by: FAMILY MEDICINE

## 2020-09-09 PROCEDURE — 99213 OFFICE O/P EST LOW 20 MIN: CPT | Performed by: FAMILY MEDICINE

## 2020-09-09 PROCEDURE — 3074F SYST BP LT 130 MM HG: CPT | Performed by: FAMILY MEDICINE

## 2020-09-09 PROCEDURE — 3079F DIAST BP 80-89 MM HG: CPT | Performed by: FAMILY MEDICINE

## 2020-09-09 NOTE — PROGRESS NOTES
Fatimah Alejandro. is a 53year old male. HPI:   Patient presents for recheck of his hypertension. Pt has been taking medications as instructed, no medication side effects, home BP monitoring in the range of ?'s systolic and 04'O diastolic.    Is back Date   • Carpal tunnel syndrome on both sides     hands, R hand repaired through surgery, L hand unrepaired   • Cellulitis     arm   • Elevated blood sugar 10/9/2013   • Essential hypertension, benign 4/6/2013   • Low testosterone    • Unspecified essentia

## 2020-09-24 DIAGNOSIS — I10 ESSENTIAL HYPERTENSION, BENIGN: ICD-10-CM

## 2020-09-25 RX ORDER — HYDROCHLOROTHIAZIDE 25 MG/1
TABLET ORAL
Qty: 90 TABLET | Refills: 1 | Status: SHIPPED | OUTPATIENT
Start: 2020-09-25 | End: 2021-04-13

## 2020-09-25 NOTE — TELEPHONE ENCOUNTER
hctz 25 mg failed protocol due to  Hypertension Medications Protocol [id number]/24/2020 03:44 PM   CMP or BMP in past 12 months   Last OV relevant to medication: 9-9-20  Last refill date: 4-15-20 #/refills: 0  When pt was asked to return for OV: 6 mo.   [employer]

## 2020-10-19 RX ORDER — ZOLPIDEM TARTRATE 10 MG/1
TABLET ORAL
Qty: 30 TABLET | Refills: 2 | Status: SHIPPED | OUTPATIENT
Start: 2020-10-19 | End: 2021-02-18

## 2020-10-19 NOTE — TELEPHONE ENCOUNTER
Medication(s) to Refill:   Requested Prescriptions     Pending Prescriptions Disp Refills   • Zolpidem Tartrate 10 MG Oral Tab [Pharmacy Med Name: ZOLPIDEM TARTRATE 10 MG TABLET] 30 tablet 2     Sig: TAKE 1 TABLET BY MOUTH EVERYDAY AT BEDTIME       LOV: 9/

## 2020-11-21 ENCOUNTER — OFFICE VISIT (OUTPATIENT)
Dept: INTERNAL MEDICINE CLINIC | Facility: CLINIC | Age: 45
End: 2020-11-21
Payer: COMMERCIAL

## 2020-11-21 VITALS
BODY MASS INDEX: 40.9 KG/M2 | DIASTOLIC BLOOD PRESSURE: 98 MMHG | HEART RATE: 94 BPM | RESPIRATION RATE: 28 BRPM | WEIGHT: 282.5 LBS | TEMPERATURE: 99 F | HEIGHT: 69.75 IN | SYSTOLIC BLOOD PRESSURE: 156 MMHG | OXYGEN SATURATION: 99 %

## 2020-11-21 DIAGNOSIS — M79.672 LEFT FOOT PAIN: Primary | ICD-10-CM

## 2020-11-21 DIAGNOSIS — R73.03 PRE-DIABETES: ICD-10-CM

## 2020-11-21 DIAGNOSIS — E66.9 OBESITY (BMI 30-39.9): ICD-10-CM

## 2020-11-21 DIAGNOSIS — I10 ESSENTIAL HYPERTENSION, BENIGN: ICD-10-CM

## 2020-11-21 DIAGNOSIS — R22.9 SOFT TISSUE SWELLING: ICD-10-CM

## 2020-11-21 DIAGNOSIS — D72.829 LEUKOCYTOSIS, UNSPECIFIED TYPE: ICD-10-CM

## 2020-11-21 PROCEDURE — 3008F BODY MASS INDEX DOCD: CPT | Performed by: NURSE PRACTITIONER

## 2020-11-21 PROCEDURE — 3077F SYST BP >= 140 MM HG: CPT | Performed by: NURSE PRACTITIONER

## 2020-11-21 PROCEDURE — 3080F DIAST BP >= 90 MM HG: CPT | Performed by: NURSE PRACTITIONER

## 2020-11-21 PROCEDURE — 99072 ADDL SUPL MATRL&STAF TM PHE: CPT | Performed by: NURSE PRACTITIONER

## 2020-11-21 PROCEDURE — 99214 OFFICE O/P EST MOD 30 MIN: CPT | Performed by: NURSE PRACTITIONER

## 2020-11-21 RX ORDER — NAPROXEN 250 MG/1
250 TABLET ORAL 2 TIMES DAILY WITH MEALS
Qty: 10 TABLET | Refills: 0 | Status: CANCELLED | OUTPATIENT
Start: 2020-11-21 | End: 2020-11-26

## 2020-11-21 NOTE — PROGRESS NOTES
CHIEF COMPLAINT:     Patient presents with:  Gout: on left foot x 4-5 days. Father has hx of gout. HPI:   Ross Marley. is a 50year old male  who presents with complaints of \"gout. \" L foot has been hurting for about a week on and off.   Eva 69.75\"   Wt 282 lb 8 oz (128.1 kg)   SpO2 99%   BMI 40.83 kg/m²   GENERAL: well developed, well nourished,in no apparent distress  SKIN: no rashes,no suspicious lesions  HEAD: atraumatic, normocephalic  LUNGS: clear to auscultation bilaterally, no wheezes

## 2020-12-10 ENCOUNTER — HOSPITAL ENCOUNTER (OUTPATIENT)
Dept: ULTRASOUND IMAGING | Facility: HOSPITAL | Age: 45
Discharge: HOME | End: 2020-12-10
Attending: NURSE PRACTITIONER
Payer: COMMERCIAL

## 2020-12-10 DIAGNOSIS — R22.9 SOFT TISSUE SWELLING: ICD-10-CM

## 2020-12-10 DIAGNOSIS — M79.672 LEFT FOOT PAIN: ICD-10-CM

## 2020-12-10 PROCEDURE — 76882 US LMTD JT/FCL EVL NVASC XTR: CPT | Performed by: NURSE PRACTITIONER

## 2020-12-14 ENCOUNTER — TELEPHONE (OUTPATIENT)
Dept: INTERNAL MEDICINE CLINIC | Facility: CLINIC | Age: 45
End: 2020-12-14

## 2020-12-14 DIAGNOSIS — M79.672 LEFT FOOT PAIN: Primary | ICD-10-CM

## 2020-12-14 NOTE — TELEPHONE ENCOUNTER
[facility]-[id number]  ASSESSMENT AND PLAN:      ASSESSMENT & PLAN:  1. Left foot pain  - US FOOT LEFT LIMITED ([id number]); Future     2. Soft tissue swelling  ? Cyst - will examine as per pt growing in size and painful.  If US neg will send to podiatry   - [address] [facility]

## 2020-12-14 NOTE — TELEPHONE ENCOUNTER
Patient was seen by Tatum Ulrich for left foot pain a couple weeks ago. He had an ultrasound done of his foot and they found an enlarged left extensor digitotum brevis secondary to hypertrophy.  He would like a referral to a physical therapist. Please ad

## 2020-12-16 NOTE — TELEPHONE ENCOUNTER
Spoke with patient notified PT order placed, provided contact information to [facility]. Patient verbalized understanding and agreeable to POC.

## 2021-01-01 DIAGNOSIS — I10 ESSENTIAL HYPERTENSION, BENIGN: ICD-10-CM

## 2021-01-04 RX ORDER — CANDESARTAN 32 MG/1
TABLET ORAL
Qty: 90 TABLET | Refills: 0 | Status: SHIPPED | OUTPATIENT
Start: 2021-01-04 | End: 2021-04-06

## 2021-01-04 NOTE — TELEPHONE ENCOUNTER
Candesartan Cilexetil 32mg last filled 7/8/20 #90     LOV 9/9/20    Last labs - ordered 11/21/20 not completed

## 2021-01-05 ENCOUNTER — OFFICE VISIT (OUTPATIENT)
Dept: PHYSICAL THERAPY | Age: 46
End: 2021-01-05
Attending: NURSE PRACTITIONER
Payer: COMMERCIAL

## 2021-01-05 DIAGNOSIS — M79.672 LEFT FOOT PAIN: ICD-10-CM

## 2021-01-05 PROCEDURE — 97110 THERAPEUTIC EXERCISES: CPT

## 2021-01-05 PROCEDURE — 97161 PT EVAL LOW COMPLEX 20 MIN: CPT

## 2021-01-06 NOTE — PROGRESS NOTES
ANKLE EVALUATION:     Referring Physician: Dr. Annabel Eastman  Diagnosis:Left foot pain     Date of Service: 1/5/2021     PATIENT Rachna Uribe. is a 50year old y/o male who presents to therapy today with complaints of pain on the top of the foot stairs, lunging. Pt and PT discuss HEP, pathology, and POC. Pt voiced understanding and performs HEP correctly without reported pain. It is medically necessary for pt to continue PT to reach functional goals.        Precautions:  None  OBJECTIVE:   Observ discomfort  In 8 visits, patient will increase left big toe extension strength to 5/5 to allow pt to ascend stairs with minimal discomfort. Frequency / Duration: Patient will be seen for 1-2 x/week or a total of 8 visits over a 90 day period.   Treatment

## 2021-01-07 ENCOUNTER — APPOINTMENT (OUTPATIENT)
Dept: PHYSICAL THERAPY | Age: 46
End: 2021-01-07
Attending: NURSE PRACTITIONER
Payer: COMMERCIAL

## 2021-01-12 ENCOUNTER — APPOINTMENT (OUTPATIENT)
Dept: PHYSICAL THERAPY | Age: 46
End: 2021-01-12
Payer: COMMERCIAL

## 2021-01-13 ENCOUNTER — TELEPHONE (OUTPATIENT)
Dept: PHYSICAL THERAPY | Facility: HOSPITAL | Age: 46
End: 2021-01-13

## 2021-01-14 ENCOUNTER — APPOINTMENT (OUTPATIENT)
Dept: PHYSICAL THERAPY | Age: 46
End: 2021-01-14
Payer: COMMERCIAL

## 2021-01-19 ENCOUNTER — APPOINTMENT (OUTPATIENT)
Dept: PHYSICAL THERAPY | Age: 46
End: 2021-01-19
Payer: COMMERCIAL

## 2021-01-21 ENCOUNTER — APPOINTMENT (OUTPATIENT)
Dept: PHYSICAL THERAPY | Age: 46
End: 2021-01-21
Payer: COMMERCIAL

## 2021-01-26 ENCOUNTER — APPOINTMENT (OUTPATIENT)
Dept: PHYSICAL THERAPY | Age: 46
End: 2021-01-26
Attending: NURSE PRACTITIONER
Payer: COMMERCIAL

## 2021-01-28 ENCOUNTER — APPOINTMENT (OUTPATIENT)
Dept: PHYSICAL THERAPY | Age: 46
End: 2021-01-28
Attending: NURSE PRACTITIONER
Payer: COMMERCIAL

## 2021-02-02 ENCOUNTER — APPOINTMENT (OUTPATIENT)
Dept: PHYSICAL THERAPY | Age: 46
End: 2021-02-02
Attending: NURSE PRACTITIONER
Payer: COMMERCIAL

## 2021-02-04 ENCOUNTER — APPOINTMENT (OUTPATIENT)
Dept: PHYSICAL THERAPY | Age: 46
End: 2021-02-04
Attending: NURSE PRACTITIONER
Payer: COMMERCIAL

## 2021-02-09 ENCOUNTER — APPOINTMENT (OUTPATIENT)
Dept: PHYSICAL THERAPY | Age: 46
End: 2021-02-09
Payer: COMMERCIAL

## 2021-02-11 ENCOUNTER — APPOINTMENT (OUTPATIENT)
Dept: PHYSICAL THERAPY | Age: 46
End: 2021-02-11
Payer: COMMERCIAL

## 2021-02-18 RX ORDER — ZOLPIDEM TARTRATE 10 MG/1
TABLET ORAL
Qty: 30 TABLET | Refills: 0 | Status: SHIPPED | OUTPATIENT
Start: 2021-02-18 | End: 2021-03-30

## 2021-02-18 NOTE — TELEPHONE ENCOUNTER
LOV: 11/21/2020 with INDERJIT Abraham   RTC: \"Return for Schedule annual physical, BP follow up\"  Last Relevant Labs: 9/6/2019   Filled: 10/19/2020   #30 with 2 refills    Future Appointments   Date Time Provider Aida Bethea   3/10/2021  4:00 P

## 2021-03-18 DIAGNOSIS — Z23 NEED FOR VACCINATION: ICD-10-CM

## 2021-03-30 RX ORDER — ZOLPIDEM TARTRATE 10 MG/1
TABLET ORAL
Qty: 30 TABLET | Refills: 0 | Status: SHIPPED | OUTPATIENT
Start: 2021-03-30 | End: 2021-05-05

## 2021-03-30 NOTE — TELEPHONE ENCOUNTER
No protocol   Medication(s) to Refill:   Requested Prescriptions     Pending Prescriptions Disp Refills   • ZOLPIDEM TARTRATE 10 MG Oral Tab [Pharmacy Med Name: ZOLPIDEM TARTRATE 10 MG TABLET] 30 tablet 0     Sig: TAKE 1 TABLET BY MOUTH EVERYDAY AT BEDTIME

## 2021-04-04 DIAGNOSIS — I10 ESSENTIAL HYPERTENSION, BENIGN: ICD-10-CM

## 2021-04-06 RX ORDER — CANDESARTAN 32 MG/1
TABLET ORAL
Qty: 90 TABLET | Refills: 0 | Status: SHIPPED | OUTPATIENT
Start: 2021-04-06 | End: 2021-11-24

## 2021-04-12 DIAGNOSIS — I10 ESSENTIAL HYPERTENSION, BENIGN: ICD-10-CM

## 2021-04-13 RX ORDER — HYDROCHLOROTHIAZIDE 25 MG/1
TABLET ORAL
Qty: 15 TABLET | Refills: 1 | Status: SHIPPED | OUTPATIENT
Start: 2021-04-13 | End: 2021-05-12

## 2021-04-13 NOTE — TELEPHONE ENCOUNTER
Failed protocol - labs needed - [employer] message sent to pt to contact office to schedule CPX - Rx pended for #15 until pt is seen    Requesting HYDROCHLOROTHIAZIDE 25 MG Oral Tab  LOV: 11/21/20  RTC: ASAP  Last Relevant Labs: 9/6/19  Filled: 9/25/20 #90 wi

## 2021-05-05 RX ORDER — ZOLPIDEM TARTRATE 10 MG/1
TABLET ORAL
Qty: 30 TABLET | Refills: 0 | Status: SHIPPED | OUTPATIENT
Start: 2021-05-05 | End: 2021-06-11

## 2021-05-05 NOTE — TELEPHONE ENCOUNTER
Name from pharmacy: ZOLPIDEM TARTRATE 10 MG TABLET         Will file in chart as: ZOLPIDEM TARTRATE 10 MG Oral Tab    Sig: TAKE 1 TABLET BY MOUTH EVERYDAY AT BEDTIME    Disp:  30 tablet    Refills:  0    Start: 5/3/2021    Class: Normal    Non-formulary

## 2021-05-12 ENCOUNTER — OFFICE VISIT (OUTPATIENT)
Dept: INTERNAL MEDICINE CLINIC | Facility: CLINIC | Age: 46
End: 2021-05-12
Payer: COMMERCIAL

## 2021-05-12 VITALS
SYSTOLIC BLOOD PRESSURE: 137 MMHG | TEMPERATURE: 98 F | RESPIRATION RATE: 16 BRPM | WEIGHT: 285 LBS | HEIGHT: 69.5 IN | BODY MASS INDEX: 41.26 KG/M2 | HEART RATE: 95 BPM | OXYGEN SATURATION: 99 % | DIASTOLIC BLOOD PRESSURE: 90 MMHG

## 2021-05-12 DIAGNOSIS — Z00.00 ROUTINE GENERAL MEDICAL EXAMINATION AT A HEALTH CARE FACILITY: Primary | ICD-10-CM

## 2021-05-12 DIAGNOSIS — L91.8 CUTANEOUS SKIN TAGS: ICD-10-CM

## 2021-05-12 DIAGNOSIS — F17.200 CURRENT EVERY DAY SMOKER: ICD-10-CM

## 2021-05-12 DIAGNOSIS — I10 ESSENTIAL HYPERTENSION, BENIGN: ICD-10-CM

## 2021-05-12 PROCEDURE — 99213 OFFICE O/P EST LOW 20 MIN: CPT | Performed by: NURSE PRACTITIONER

## 2021-05-12 PROCEDURE — 3075F SYST BP GE 130 - 139MM HG: CPT | Performed by: NURSE PRACTITIONER

## 2021-05-12 PROCEDURE — 99396 PREV VISIT EST AGE 40-64: CPT | Performed by: NURSE PRACTITIONER

## 2021-05-12 PROCEDURE — 3080F DIAST BP >= 90 MM HG: CPT | Performed by: NURSE PRACTITIONER

## 2021-05-12 PROCEDURE — 3008F BODY MASS INDEX DOCD: CPT | Performed by: NURSE PRACTITIONER

## 2021-05-12 RX ORDER — HYDROCHLOROTHIAZIDE 25 MG/1
25 TABLET ORAL DAILY
Qty: 90 TABLET | Refills: 1 | Status: SHIPPED | OUTPATIENT
Start: 2021-05-12 | End: 2021-11-24

## 2021-05-12 NOTE — PROGRESS NOTES
Patient presents with:  Physical: Currently no fasting  Immunization/Injection: Flagging for Pneumo vaccine  Joint Pain: For the past 2 years vianney feet/hand pain     HPI:   Patient presents for recheck of his hypertension.  Pt has been taking medications as to 62yrs(1 of 3 - PCV13) Never done  Annual Depression Screen due on 06/08/2021  Annual Physical due on 05/12/2022  Influenza Vaccine Completed  COVID-19 Vaccine Completed    Family Hx: Prostate cancer none  Family Hx Colon cancer: none  Pt with hx of test with needle and heat 10/11   • ORCHIECTOMY   Left 10/2016    Herminio Vasquez    s/p radiation      • OTHER SURGICAL HISTORY      R carpal, Neuroplasty Decompression Median Nerve  At Carpal tunnel    • TONSILLECTOMY      As a child   • VASECTOMY  10/2016     S clear  EYES:PERRLA, EOMI, normal optic disk,conjunctiva are clear  NECK: supple,no adenopathy, no thyromegaly   CHEST: no chest tenderness  LUNGS: clear to auscultation  CARDIO: RRR without murmur  GI: good BS's,no masses, HSM or tenderness  : deferred t

## 2021-06-11 RX ORDER — ZOLPIDEM TARTRATE 10 MG/1
TABLET ORAL
Qty: 30 TABLET | Refills: 0 | Status: SHIPPED | OUTPATIENT
Start: 2021-06-11 | End: 2021-07-12

## 2021-06-15 ENCOUNTER — TELEPHONE (OUTPATIENT)
Dept: INTERNAL MEDICINE CLINIC | Facility: CLINIC | Age: 46
End: 2021-06-15

## 2021-06-15 DIAGNOSIS — M79.642 LEFT HAND PAIN: Primary | ICD-10-CM

## 2021-06-15 DIAGNOSIS — M25.532 LEFT WRIST PAIN: ICD-10-CM

## 2021-06-15 NOTE — TELEPHONE ENCOUNTER
Spoke with pt stating he is having pain in left hand since last Tuesday, no swelling, electric pain-shock, unable to move hand. Splinted last couple days and has helped. KR would you like to see patient first or refer out?

## 2021-06-15 NOTE — TELEPHONE ENCOUNTER
Patient stated he had a problem with carpel tunnel is his right hand about 10 years ago and now it is in his left hand.  Patient requesting a referral to a specialist or if he needs to be seen in office, he is willing to do that first.

## 2021-07-12 RX ORDER — ZOLPIDEM TARTRATE 10 MG/1
TABLET ORAL
Qty: 30 TABLET | Refills: 0 | Status: SHIPPED | OUTPATIENT
Start: 2021-07-12 | End: 2021-08-16

## 2021-08-16 RX ORDER — ZOLPIDEM TARTRATE 10 MG/1
TABLET ORAL
Qty: 30 TABLET | Refills: 0 | Status: SHIPPED | OUTPATIENT
Start: 2021-08-16 | End: 2021-09-22

## 2021-08-16 NOTE — TELEPHONE ENCOUNTER
Name from pharmacy: ZOLPIDEM TARTRATE 10 MG TABLET          Will file in chart as: ZOLPIDEM 10 MG Oral Tab    Sig: TAKE 1 TABLET BY MOUTH EVERYDAY AT BEDTIME    Disp:  30 tablet    Refills:  0    Start: 8/15/2021    Class: Normal    Non-formulary    To pha

## 2021-09-22 RX ORDER — ZOLPIDEM TARTRATE 10 MG/1
TABLET ORAL
Qty: 30 TABLET | Refills: 0 | Status: SHIPPED | OUTPATIENT
Start: 2021-09-22 | End: 2021-10-23

## 2021-09-22 NOTE — TELEPHONE ENCOUNTER
Name from pharmacy: ZOLPIDEM TARTRATE 10 MG TABLET          Will file in chart as: ZOLPIDEM 10 MG Oral Tab    Sig: TAKE 1 TABLET BY MOUTH EVERYDAY AT BEDTIME    Disp:  30 tablet    Refills:  0    Start: 9/21/2021    Class: Normal    Non-formulary    To pha

## 2021-10-14 ENCOUNTER — OFFICE VISIT (OUTPATIENT)
Dept: INTERNAL MEDICINE CLINIC | Facility: CLINIC | Age: 46
End: 2021-10-14
Payer: COMMERCIAL

## 2021-10-14 VITALS
HEART RATE: 72 BPM | HEIGHT: 70 IN | BODY MASS INDEX: 36.51 KG/M2 | OXYGEN SATURATION: 98 % | DIASTOLIC BLOOD PRESSURE: 80 MMHG | SYSTOLIC BLOOD PRESSURE: 130 MMHG | RESPIRATION RATE: 20 BRPM | TEMPERATURE: 98 F | WEIGHT: 255 LBS

## 2021-10-14 DIAGNOSIS — I10 ESSENTIAL HYPERTENSION, BENIGN: Primary | ICD-10-CM

## 2021-10-14 DIAGNOSIS — E66.9 OBESITY (BMI 30-39.9): ICD-10-CM

## 2021-10-14 PROCEDURE — 3079F DIAST BP 80-89 MM HG: CPT | Performed by: FAMILY MEDICINE

## 2021-10-14 PROCEDURE — 3075F SYST BP GE 130 - 139MM HG: CPT | Performed by: FAMILY MEDICINE

## 2021-10-14 PROCEDURE — 99213 OFFICE O/P EST LOW 20 MIN: CPT | Performed by: FAMILY MEDICINE

## 2021-10-14 PROCEDURE — 3008F BODY MASS INDEX DOCD: CPT | Performed by: FAMILY MEDICINE

## 2021-10-14 NOTE — PROGRESS NOTES
Artemio Tosin. is a 48year old male. HPI:   In the last 2 months has been eating 1800cal a day.   Before he would eat whatever he wanted   Still continues to exercise     Feels so much better    Much less aches in the joints    No longer snoring per HEALTH: feels well otherwise      EXAM:   /80   Pulse 72   Temp 97.6 °F (36.4 °C) (Oral)   Resp 20   Ht 5' 10\" (1.778 m)   Wt 255 lb (115.7 kg)   SpO2 98%   BMI 36.59 kg/m²   GENERAL: well developed, well nourished,in no apparent distress  NECK: sup

## 2021-10-23 RX ORDER — ZOLPIDEM TARTRATE 10 MG/1
TABLET ORAL
Qty: 30 TABLET | Refills: 1 | Status: SHIPPED | OUTPATIENT
Start: 2021-10-23 | End: 2022-01-04

## 2021-11-24 DIAGNOSIS — I10 ESSENTIAL HYPERTENSION, BENIGN: ICD-10-CM

## 2021-11-24 RX ORDER — CANDESARTAN 32 MG/1
TABLET ORAL
Qty: 90 TABLET | Refills: 0 | Status: SHIPPED | OUTPATIENT
Start: 2021-11-24

## 2021-11-24 RX ORDER — HYDROCHLOROTHIAZIDE 25 MG/1
TABLET ORAL
Qty: 90 TABLET | Refills: 1 | Status: SHIPPED | OUTPATIENT
Start: 2021-11-24

## 2021-11-24 NOTE — TELEPHONE ENCOUNTER
Candesartan 32 mg failed protocol due to   Hypertension Medications Protocol Failed 11/24/2021 12:22 AM   Protocol Details  CMP or BMP in past 12 months      Filled 4-6-21  Qty 90  0 refills  No upcoming appt.    LOV 10-14-21    hydrochlorothiazide 25 mg fa

## 2022-01-04 RX ORDER — ZOLPIDEM TARTRATE 10 MG/1
TABLET ORAL
Qty: 30 TABLET | Refills: 1 | Status: SHIPPED | OUTPATIENT
Start: 2022-01-04 | End: 2022-01-31

## 2022-02-01 RX ORDER — ZOLPIDEM TARTRATE 10 MG/1
10 TABLET ORAL NIGHTLY
Qty: 30 TABLET | Refills: 1 | Status: SHIPPED | OUTPATIENT
Start: 2022-02-01

## 2022-02-08 ENCOUNTER — TELEPHONE (OUTPATIENT)
Dept: INTERNAL MEDICINE CLINIC | Facility: CLINIC | Age: 47
End: 2022-02-08

## 2022-02-08 NOTE — TELEPHONE ENCOUNTER
Pt tried to schedule lab work however now . Last ordered in . Requesting lab work to be re-entered. Pt not due for annual cpx until 22.      Please advise

## 2022-02-19 ENCOUNTER — HOSPITAL ENCOUNTER (OUTPATIENT)
Age: 47
Discharge: HOME | End: 2022-02-19
Attending: EMERGENCY MEDICINE
Payer: COMMERCIAL

## 2022-02-19 VITALS
DIASTOLIC BLOOD PRESSURE: 69 MMHG | BODY MASS INDEX: 35.79 KG/M2 | HEART RATE: 87 BPM | WEIGHT: 250 LBS | OXYGEN SATURATION: 99 % | HEIGHT: 70 IN | RESPIRATION RATE: 20 BRPM | TEMPERATURE: 97 F | SYSTOLIC BLOOD PRESSURE: 147 MMHG

## 2022-02-19 DIAGNOSIS — J02.8 ACUTE BACTERIAL PHARYNGITIS: ICD-10-CM

## 2022-02-19 DIAGNOSIS — H66.90 ACUTE OTITIS MEDIA, UNSPECIFIED OTITIS MEDIA TYPE: Primary | ICD-10-CM

## 2022-02-19 DIAGNOSIS — B96.89 ACUTE BACTERIAL PHARYNGITIS: ICD-10-CM

## 2022-02-19 PROCEDURE — 99203 OFFICE O/P NEW LOW 30 MIN: CPT

## 2022-02-19 PROCEDURE — 99213 OFFICE O/P EST LOW 20 MIN: CPT

## 2022-02-19 RX ORDER — AMOXICILLIN AND CLAVULANATE POTASSIUM 875; 125 MG/1; MG/1
1 TABLET, FILM COATED ORAL 2 TIMES DAILY
Qty: 20 TABLET | Refills: 0 | Status: SHIPPED | OUTPATIENT
Start: 2022-02-19 | End: 2022-02-21

## 2022-02-19 NOTE — ED INITIAL ASSESSMENT (HPI)
Patient c/o right ear pain X 1 week. Feels lump under right ear as well. Also c/o right arm sensitivity \"sunburn\" feeling since last Thursday.

## 2022-02-21 ENCOUNTER — HOSPITAL ENCOUNTER (OUTPATIENT)
Age: 47
Discharge: HOME | End: 2022-02-21
Payer: COMMERCIAL

## 2022-02-21 VITALS
RESPIRATION RATE: 14 BRPM | TEMPERATURE: 99 F | BODY MASS INDEX: 35.79 KG/M2 | HEART RATE: 76 BPM | OXYGEN SATURATION: 100 % | WEIGHT: 250 LBS | SYSTOLIC BLOOD PRESSURE: 146 MMHG | DIASTOLIC BLOOD PRESSURE: 68 MMHG | HEIGHT: 70 IN

## 2022-02-21 DIAGNOSIS — B02.8 HERPES ZOSTER WITH COMPLICATION: Primary | ICD-10-CM

## 2022-02-21 PROCEDURE — 99213 OFFICE O/P EST LOW 20 MIN: CPT

## 2022-02-21 RX ORDER — VALACYCLOVIR HYDROCHLORIDE 1 G/1
1000 TABLET, FILM COATED ORAL 3 TIMES DAILY
Qty: 21 TABLET | Refills: 0 | Status: SHIPPED | OUTPATIENT
Start: 2022-02-21 | End: 2022-02-28

## 2022-02-21 NOTE — ED INITIAL ASSESSMENT (HPI)
Pt began with a blistery rash to the top of his head on the rt side.   He was just treated for an ear infection

## 2022-03-10 RX ORDER — ZOLPIDEM TARTRATE 10 MG/1
10 TABLET ORAL NIGHTLY
Qty: 30 TABLET | Refills: 1 | OUTPATIENT
Start: 2022-03-10

## 2022-03-11 ENCOUNTER — TELEPHONE (OUTPATIENT)
Dept: INTERNAL MEDICINE CLINIC | Facility: CLINIC | Age: 47
End: 2022-03-11

## 2022-03-11 LAB
A/G RATIO: 2.6
ALBUMIN: 4.6 G/DL
ALKALINE PHOSPHATASE: 82
ALT: 33
AST: 35
BILIRUBIN, TOTAL: 0.4 MG/DL
BUN: 18 MG/DL (ref 7–22)
CALCIUM: 9.1
CARBON DIOXIDE: 26
CHLORIDE: 103
CREATININE: 1.25 MG/DL
EGFR IF AFRICN AM: 78
EGFR IF NONAFRICN AM: 67
ESTRADIOL: 25 PG/ML
GLUCOSE: 104
POTASSIUM: 3.8
SODIUM: 141
TOTAL PROTEIN: 6.4

## 2022-03-11 NOTE — TELEPHONE ENCOUNTER
Incoming fax from [facility]    Patients labs done on 3/8/2022     Epic updated and papers placed in  in basket for review

## 2022-04-28 ENCOUNTER — PATIENT MESSAGE (OUTPATIENT)
Dept: INTERNAL MEDICINE CLINIC | Facility: CLINIC | Age: 47
End: 2022-04-28

## 2022-04-28 RX ORDER — ZOLPIDEM TARTRATE 10 MG/1
10 TABLET ORAL NIGHTLY
Qty: 30 TABLET | Refills: 1 | Status: SHIPPED | OUTPATIENT
Start: 2022-04-28

## 2022-04-29 NOTE — TELEPHONE ENCOUNTER
From: Masoud Gomze. To: Nilton Tirado MD  Sent: 4/28/2022 5:04 PM CDT  Subject: Do I need to do blood work?     I got a letter saying I need to do blood work when I talk to somebody at the office a few months ago they said I wasn't due for a while but if I am can you let me know and I will get it done

## 2022-05-07 ENCOUNTER — LAB ENCOUNTER (OUTPATIENT)
Dept: LAB | Age: 47
End: 2022-05-07
Attending: FAMILY MEDICINE
Payer: COMMERCIAL

## 2022-05-07 DIAGNOSIS — Z00.00 ROUTINE GENERAL MEDICAL EXAMINATION AT A HEALTH CARE FACILITY: ICD-10-CM

## 2022-05-07 DIAGNOSIS — I10 ESSENTIAL HYPERTENSION, BENIGN: ICD-10-CM

## 2022-05-07 DIAGNOSIS — R73.03 PRE-DIABETES: ICD-10-CM

## 2022-05-07 LAB
ALBUMIN SERPL-MCNC: 4.3 G/DL (ref 3.4–5)
ALBUMIN/GLOB SERPL: 1.5 {RATIO} (ref 1–2)
ALP LIVER SERPL-CCNC: 93 U/L
ALT SERPL-CCNC: 44 U/L
ANION GAP SERPL CALC-SCNC: 7 MMOL/L (ref 0–18)
AST SERPL-CCNC: 36 U/L (ref 15–37)
BASOPHILS # BLD AUTO: 0.16 X10(3) UL (ref 0–0.2)
BASOPHILS NFR BLD AUTO: 1.7 %
BILIRUB SERPL-MCNC: 1.2 MG/DL (ref 0.1–2)
BUN BLD-MCNC: 23 MG/DL (ref 7–18)
CALCIUM BLD-MCNC: 9.3 MG/DL (ref 8.5–10.1)
CHLORIDE SERPL-SCNC: 100 MMOL/L (ref 98–112)
CHOLEST SERPL-MCNC: 167 MG/DL (ref ?–200)
CO2 SERPL-SCNC: 30 MMOL/L (ref 21–32)
CREAT BLD-MCNC: 1.32 MG/DL
EOSINOPHIL # BLD AUTO: 0.38 X10(3) UL (ref 0–0.7)
EOSINOPHIL NFR BLD AUTO: 3.9 %
ERYTHROCYTE [DISTWIDTH] IN BLOOD BY AUTOMATED COUNT: 15 %
EST. AVERAGE GLUCOSE BLD GHB EST-MCNC: 123 MG/DL (ref 68–126)
FASTING PATIENT LIPID ANSWER: YES
FASTING STATUS PATIENT QL REPORTED: YES
GLOBULIN PLAS-MCNC: 2.9 G/DL (ref 2.8–4.4)
GLUCOSE BLD-MCNC: 89 MG/DL (ref 70–99)
HBA1C MFR BLD: 5.9 % (ref ?–5.7)
HCT VFR BLD AUTO: 60.4 %
HDLC SERPL-MCNC: 47 MG/DL (ref 40–59)
HGB BLD-MCNC: 20.3 G/DL
IMM GRANULOCYTES # BLD AUTO: 0.1 X10(3) UL (ref 0–1)
IMM GRANULOCYTES NFR BLD: 1 %
LDLC SERPL CALC-MCNC: 102 MG/DL (ref ?–100)
LYMPHOCYTES # BLD AUTO: 1.36 X10(3) UL (ref 1–4)
LYMPHOCYTES NFR BLD AUTO: 14 %
MCH RBC QN AUTO: 30.6 PG (ref 26–34)
MCHC RBC AUTO-ENTMCNC: 33.6 G/DL (ref 31–37)
MCV RBC AUTO: 91.1 FL
MONOCYTES # BLD AUTO: 0.79 X10(3) UL (ref 0.1–1)
MONOCYTES NFR BLD AUTO: 8.2 %
NEUTROPHILS # BLD AUTO: 6.89 X10 (3) UL (ref 1.5–7.7)
NEUTROPHILS # BLD AUTO: 6.89 X10(3) UL (ref 1.5–7.7)
NEUTROPHILS NFR BLD AUTO: 71.2 %
NONHDLC SERPL-MCNC: 120 MG/DL (ref ?–130)
OSMOLALITY SERPL CALC.SUM OF ELEC: 287 MOSM/KG (ref 275–295)
PLATELET # BLD AUTO: 224 10(3)UL (ref 150–450)
POTASSIUM SERPL-SCNC: 3.8 MMOL/L (ref 3.5–5.1)
PROT SERPL-MCNC: 7.2 G/DL (ref 6.4–8.2)
RBC # BLD AUTO: 6.63 X10(6)UL
SODIUM SERPL-SCNC: 137 MMOL/L (ref 136–145)
TRIGL SERPL-MCNC: 97 MG/DL (ref 30–149)
VLDLC SERPL CALC-MCNC: 16 MG/DL (ref 0–30)
WBC # BLD AUTO: 9.7 X10(3) UL (ref 4–11)

## 2022-05-07 PROCEDURE — 80061 LIPID PANEL: CPT

## 2022-05-07 PROCEDURE — 83036 HEMOGLOBIN GLYCOSYLATED A1C: CPT

## 2022-05-07 PROCEDURE — 85025 COMPLETE CBC W/AUTO DIFF WBC: CPT

## 2022-05-07 PROCEDURE — 80053 COMPREHEN METABOLIC PANEL: CPT

## 2022-05-07 PROCEDURE — 36415 COLL VENOUS BLD VENIPUNCTURE: CPT

## 2022-05-13 ENCOUNTER — TELEPHONE (OUTPATIENT)
Dept: INTERNAL MEDICINE CLINIC | Facility: CLINIC | Age: 47
End: 2022-05-13

## 2022-05-13 NOTE — TELEPHONE ENCOUNTER
Pt stated hemoglobin levels are too high and therefor unable to donate blood. Pt requesting a prescription that can help with that so he can donate blood. Please advise.      [id number] [place], [place] - [address] [facility], [phone], [phone]   [address] [place] [address]   Phone: [phone] Fax: [phone]

## 2022-05-19 ENCOUNTER — OFFICE VISIT (OUTPATIENT)
Dept: INTERNAL MEDICINE CLINIC | Facility: CLINIC | Age: 47
End: 2022-05-19
Payer: COMMERCIAL

## 2022-05-19 VITALS
DIASTOLIC BLOOD PRESSURE: 82 MMHG | HEIGHT: 70 IN | RESPIRATION RATE: 20 BRPM | TEMPERATURE: 98 F | SYSTOLIC BLOOD PRESSURE: 130 MMHG | WEIGHT: 245 LBS | HEART RATE: 98 BPM | OXYGEN SATURATION: 97 % | BODY MASS INDEX: 35.07 KG/M2

## 2022-05-19 DIAGNOSIS — E66.9 OBESITY (BMI 30-39.9): ICD-10-CM

## 2022-05-19 DIAGNOSIS — R73.03 PRE-DIABETES: ICD-10-CM

## 2022-05-19 DIAGNOSIS — D72.829 LEUKOCYTOSIS, UNSPECIFIED TYPE: ICD-10-CM

## 2022-05-19 DIAGNOSIS — Z12.11 COLON CANCER SCREENING: ICD-10-CM

## 2022-05-19 DIAGNOSIS — Z00.00 WELLNESS EXAMINATION: Primary | ICD-10-CM

## 2022-05-19 DIAGNOSIS — I10 ESSENTIAL HYPERTENSION, BENIGN: ICD-10-CM

## 2022-05-19 PROCEDURE — 3008F BODY MASS INDEX DOCD: CPT | Performed by: FAMILY MEDICINE

## 2022-05-19 PROCEDURE — 99396 PREV VISIT EST AGE 40-64: CPT | Performed by: FAMILY MEDICINE

## 2022-05-19 PROCEDURE — 3075F SYST BP GE 130 - 139MM HG: CPT | Performed by: FAMILY MEDICINE

## 2022-05-19 PROCEDURE — 3079F DIAST BP 80-89 MM HG: CPT | Performed by: FAMILY MEDICINE

## 2022-06-03 DIAGNOSIS — I10 ESSENTIAL HYPERTENSION, BENIGN: ICD-10-CM

## 2022-06-03 RX ORDER — HYDROCHLOROTHIAZIDE 25 MG/1
TABLET ORAL
Qty: 90 TABLET | Refills: 1 | Status: SHIPPED | OUTPATIENT
Start: 2022-06-03

## 2022-06-13 DIAGNOSIS — I10 ESSENTIAL HYPERTENSION, BENIGN: ICD-10-CM

## 2022-06-14 RX ORDER — ZOLPIDEM TARTRATE 10 MG/1
10 TABLET ORAL NIGHTLY
Qty: 30 TABLET | Refills: 1 | Status: SHIPPED | OUTPATIENT
Start: 2022-06-14

## 2022-06-14 RX ORDER — CANDESARTAN 32 MG/1
32 TABLET ORAL DAILY
Qty: 90 TABLET | Refills: 0 | Status: SHIPPED | OUTPATIENT
Start: 2022-06-14

## 2022-06-14 NOTE — TELEPHONE ENCOUNTER
Protocol passed     Requesting: candesartan 32mg     LOV: 5/19/22   RTC: 1 yr   Filled: 11/24/21 #90 0 refills   Recent Labs: 5/7/22     Upcoming OV: none scheduled

## 2022-08-08 ENCOUNTER — OFFICE VISIT (OUTPATIENT)
Dept: INTERNAL MEDICINE CLINIC | Facility: CLINIC | Age: 47
End: 2022-08-08
Payer: COMMERCIAL

## 2022-08-08 VITALS
WEIGHT: 251.63 LBS | OXYGEN SATURATION: 96 % | DIASTOLIC BLOOD PRESSURE: 82 MMHG | BODY MASS INDEX: 36.02 KG/M2 | HEART RATE: 67 BPM | SYSTOLIC BLOOD PRESSURE: 130 MMHG | TEMPERATURE: 98 F | HEIGHT: 70 IN

## 2022-08-08 DIAGNOSIS — I10 ESSENTIAL HYPERTENSION, BENIGN: ICD-10-CM

## 2022-08-08 DIAGNOSIS — Z12.11 COLON CANCER SCREENING: Primary | ICD-10-CM

## 2022-08-08 DIAGNOSIS — G89.29 CHRONIC LEFT-SIDED LOW BACK PAIN WITH LEFT-SIDED SCIATICA: ICD-10-CM

## 2022-08-08 DIAGNOSIS — M54.42 CHRONIC LEFT-SIDED LOW BACK PAIN WITH LEFT-SIDED SCIATICA: ICD-10-CM

## 2022-08-08 PROCEDURE — 99213 OFFICE O/P EST LOW 20 MIN: CPT | Performed by: FAMILY MEDICINE

## 2022-08-08 PROCEDURE — 3075F SYST BP GE 130 - 139MM HG: CPT | Performed by: FAMILY MEDICINE

## 2022-08-08 PROCEDURE — 3008F BODY MASS INDEX DOCD: CPT | Performed by: FAMILY MEDICINE

## 2022-08-08 PROCEDURE — 3079F DIAST BP 80-89 MM HG: CPT | Performed by: FAMILY MEDICINE

## 2022-08-09 ENCOUNTER — HOSPITAL ENCOUNTER (OUTPATIENT)
Age: 47
Discharge: HOME | End: 2022-08-09
Payer: COMMERCIAL

## 2022-08-09 VITALS
OXYGEN SATURATION: 98 % | SYSTOLIC BLOOD PRESSURE: 170 MMHG | HEART RATE: 78 BPM | HEIGHT: 70 IN | TEMPERATURE: 98 F | WEIGHT: 250 LBS | BODY MASS INDEX: 35.79 KG/M2 | DIASTOLIC BLOOD PRESSURE: 77 MMHG | RESPIRATION RATE: 16 BRPM

## 2022-08-09 DIAGNOSIS — K11.20 PAROTIDITIS: Primary | ICD-10-CM

## 2022-08-09 DIAGNOSIS — I10 HYPERTENSION, UNSPECIFIED TYPE: ICD-10-CM

## 2022-08-09 PROCEDURE — 99213 OFFICE O/P EST LOW 20 MIN: CPT

## 2022-08-09 RX ORDER — AMOXICILLIN AND CLAVULANATE POTASSIUM 875; 125 MG/1; MG/1
1 TABLET, FILM COATED ORAL 2 TIMES DAILY
Qty: 14 TABLET | Refills: 0 | Status: SHIPPED | OUTPATIENT
Start: 2022-08-09 | End: 2022-08-16

## 2022-08-10 ENCOUNTER — OFFICE VISIT (OUTPATIENT)
Dept: PAIN CLINIC | Facility: CLINIC | Age: 47
End: 2022-08-10
Payer: COMMERCIAL

## 2022-08-10 VITALS — HEART RATE: 84 BPM | SYSTOLIC BLOOD PRESSURE: 128 MMHG | OXYGEN SATURATION: 97 % | DIASTOLIC BLOOD PRESSURE: 82 MMHG

## 2022-08-10 DIAGNOSIS — M54.16 LUMBAR RADICULOPATHY: Primary | ICD-10-CM

## 2022-08-10 PROCEDURE — 3079F DIAST BP 80-89 MM HG: CPT | Performed by: PHYSICIAN ASSISTANT

## 2022-08-10 PROCEDURE — 99204 OFFICE O/P NEW MOD 45 MIN: CPT | Performed by: PHYSICIAN ASSISTANT

## 2022-08-10 PROCEDURE — 3074F SYST BP LT 130 MM HG: CPT | Performed by: PHYSICIAN ASSISTANT

## 2022-08-11 RX ORDER — PANTOPRAZOLE SODIUM 40 MG/1
40 TABLET, DELAYED RELEASE ORAL
Qty: 90 TABLET | Refills: 0 | Status: SHIPPED | OUTPATIENT
Start: 2022-08-11

## 2022-08-16 RX ORDER — ZOLPIDEM TARTRATE 10 MG/1
10 TABLET ORAL NIGHTLY
Qty: 30 TABLET | Refills: 1 | Status: SHIPPED | OUTPATIENT
Start: 2022-08-16

## 2022-08-17 ENCOUNTER — HOSPITAL ENCOUNTER (OUTPATIENT)
Dept: MRI IMAGING | Age: 47
Discharge: HOME | End: 2022-08-17
Attending: PHYSICIAN ASSISTANT
Payer: COMMERCIAL

## 2022-08-17 DIAGNOSIS — M54.16 LUMBAR RADICULOPATHY: ICD-10-CM

## 2022-08-17 PROCEDURE — 72148 MRI LUMBAR SPINE W/O DYE: CPT | Performed by: PHYSICIAN ASSISTANT

## 2022-08-18 ENCOUNTER — TELEPHONE (OUTPATIENT)
Dept: PAIN CLINIC | Facility: CLINIC | Age: 47
End: 2022-08-18

## 2022-08-18 DIAGNOSIS — R07.89 CHEST WALL PAIN: Primary | ICD-10-CM

## 2022-08-18 NOTE — TELEPHONE ENCOUNTER
Spoke with reading radiologist, Dr. David Franco, regarding incidental findings at L 11th rib. For further evaluation, asked for CT with contrast with attention to area of question. Called to informed patient as such, no answer. Asked him to call us back. Patient returned call, and relayed info. Will get CT.

## 2022-08-22 NOTE — TELEPHONE ENCOUNTER
Rep from [facility] called to get clarification on CT order. Transferred the call to Danielle Bneitez.

## 2022-08-22 NOTE — TELEPHONE ENCOUNTER
Spoke to Macie w/ CT who is questioning the order that has been placed as a CT was not mentioned in the conclusion of the MRI. Davidson Jansen of the info below. Macie then questioned if the CT is supposed to be with and without or just with contrast as they would want to limit the amt of radiation to the area. Advised that I can check with TANIA Chisholm and advise.

## 2022-08-22 NOTE — TELEPHONE ENCOUNTER
Spoke to [employer] who states CT should be done WITH contrast only. Info relayed to Gamaliel Peralta in [place].

## 2022-08-24 ENCOUNTER — HOSPITAL ENCOUNTER (OUTPATIENT)
Dept: CT IMAGING | Age: 47
Discharge: HOME | End: 2022-08-24
Attending: PHYSICIAN ASSISTANT
Payer: COMMERCIAL

## 2022-08-24 DIAGNOSIS — R07.89 CHEST WALL PAIN: ICD-10-CM

## 2022-08-24 LAB
CREAT BLD-MCNC: 1.3 MG/DL
GFR SERPLBLD BASED ON 1.73 SQ M-ARVRAT: 68 ML/MIN/1.73M2 (ref 60–?)

## 2022-08-24 PROCEDURE — 82565 ASSAY OF CREATININE: CPT

## 2022-08-24 PROCEDURE — 71260 CT THORAX DX C+: CPT | Performed by: PHYSICIAN ASSISTANT

## 2022-08-25 ENCOUNTER — TELEPHONE (OUTPATIENT)
Dept: PAIN CLINIC | Facility: CLINIC | Age: 47
End: 2022-08-25

## 2022-08-25 DIAGNOSIS — M54.16 LEFT LUMBAR RADICULITIS: ICD-10-CM

## 2022-08-25 DIAGNOSIS — M48.061 LUMBAR FORAMINAL STENOSIS: Primary | ICD-10-CM

## 2022-08-25 NOTE — TELEPHONE ENCOUNTER
Called pt and relayed findings on CT to him. Still with LBP (midline and radiating bilaterally) with L anterior thigh pain. Will proceed with the previuosly discussed LESI, targeting L3/4.

## 2022-08-26 ENCOUNTER — TELEPHONE (OUTPATIENT)
Dept: NEUROLOGY | Facility: CLINIC | Age: 47
End: 2022-08-26

## 2022-08-26 DIAGNOSIS — M54.16 LUMBAR RADICULITIS: Primary | ICD-10-CM

## 2022-08-26 NOTE — TELEPHONE ENCOUNTER
Prior authorization request completed for: LESCATALINO   Authorization # NO PRIOR AUTHORIZATION REQUIRED  Authorization dates: N/A  CPT codes approved: 05418  Number of visits/dates of service approved: 1  Physician: Dr. Hannah Alamo   Location: [facility]     Call Ref#: [id number]  Representative Name: Darius Job    Patient can be scheduled. Routed to Navigator.

## 2022-09-02 ENCOUNTER — OFFICE VISIT (OUTPATIENT)
Dept: PAIN CLINIC | Facility: CLINIC | Age: 47
End: 2022-09-02
Payer: COMMERCIAL

## 2022-09-02 VITALS — SYSTOLIC BLOOD PRESSURE: 130 MMHG | OXYGEN SATURATION: 100 % | DIASTOLIC BLOOD PRESSURE: 100 MMHG | HEART RATE: 88 BPM

## 2022-09-02 DIAGNOSIS — M25.512 ACUTE PAIN OF LEFT SHOULDER: Primary | ICD-10-CM

## 2022-09-02 NOTE — PROGRESS NOTES
Patient presents in office today with reported pain in neck    Current pain level reported = 3/10    Last reported dose of nothing      Narcotic Contract renewal na    Urine Drug screen na

## 2022-09-06 ENCOUNTER — APPOINTMENT (OUTPATIENT)
Dept: GENERAL RADIOLOGY | Facility: HOSPITAL | Age: 47
End: 2022-09-06
Attending: ANESTHESIOLOGY
Payer: COMMERCIAL

## 2022-09-06 ENCOUNTER — HOSPITAL ENCOUNTER (OUTPATIENT)
Facility: HOSPITAL | Age: 47
Setting detail: HOSPITAL OUTPATIENT SURGERY
Discharge: HOME | End: 2022-09-06
Attending: ANESTHESIOLOGY | Admitting: ANESTHESIOLOGY
Payer: COMMERCIAL

## 2022-09-06 VITALS
BODY MASS INDEX: 35.93 KG/M2 | HEIGHT: 70 IN | WEIGHT: 251 LBS | OXYGEN SATURATION: 98 % | DIASTOLIC BLOOD PRESSURE: 83 MMHG | TEMPERATURE: 99 F | HEART RATE: 66 BPM | RESPIRATION RATE: 16 BRPM | SYSTOLIC BLOOD PRESSURE: 139 MMHG

## 2022-09-06 DIAGNOSIS — M54.16 LUMBAR RADICULITIS: ICD-10-CM

## 2022-09-06 PROCEDURE — 3E0S33Z INTRODUCTION OF ANTI-INFLAMMATORY INTO EPIDURAL SPACE, PERCUTANEOUS APPROACH: ICD-10-PCS | Performed by: ANESTHESIOLOGY

## 2022-09-06 PROCEDURE — 62323 NJX INTERLAMINAR LMBR/SAC: CPT | Performed by: ANESTHESIOLOGY

## 2022-09-06 PROCEDURE — B01B1ZZ FLUOROSCOPY OF SPINAL CORD USING LOW OSMOLAR CONTRAST: ICD-10-PCS | Performed by: ANESTHESIOLOGY

## 2022-09-06 RX ORDER — LIDOCAINE HYDROCHLORIDE 10 MG/ML
INJECTION, SOLUTION EPIDURAL; INFILTRATION; INTRACAUDAL; PERINEURAL
Status: DISCONTINUED | OUTPATIENT
Start: 2022-09-06 | End: 2022-09-06

## 2022-09-06 RX ORDER — SODIUM CHLORIDE 9 MG/ML
INJECTION INTRAVENOUS
Status: DISCONTINUED | OUTPATIENT
Start: 2022-09-06 | End: 2022-09-06

## 2022-09-06 RX ORDER — DEXAMETHASONE SODIUM PHOSPHATE 10 MG/ML
INJECTION, SOLUTION INTRAMUSCULAR; INTRAVENOUS
Status: DISCONTINUED | OUTPATIENT
Start: 2022-09-06 | End: 2022-09-06

## 2022-09-12 NOTE — TELEPHONE ENCOUNTER
----- Message from GÉNESIS Sarmiento sent at 9/12/2022  8:10 AM CDT -----  Please inform patient results show  There is no obstruction. But there is a moderate amount of stool in the right colon.    Did she do the clean out process and have a bm? Last filled 2/4/19 #90    LOV 11/7/18     Upcoming appt 5/8/19    Labs due

## 2022-09-18 RX ORDER — ZOLPIDEM TARTRATE 10 MG/1
10 TABLET ORAL NIGHTLY
Qty: 30 TABLET | Refills: 1 | Status: SHIPPED | OUTPATIENT
Start: 2022-09-18

## 2022-10-19 RX ORDER — ZOLPIDEM TARTRATE 10 MG/1
10 TABLET ORAL NIGHTLY
Qty: 30 TABLET | Refills: 1 | Status: SHIPPED | OUTPATIENT
Start: 2022-10-19

## 2022-11-08 RX ORDER — PANTOPRAZOLE SODIUM 40 MG/1
TABLET, DELAYED RELEASE ORAL
Qty: 90 TABLET | Refills: 0 | Status: SHIPPED | OUTPATIENT
Start: 2022-11-08

## 2022-11-10 ENCOUNTER — TELEPHONE (OUTPATIENT)
Dept: INTERNAL MEDICINE CLINIC | Facility: CLINIC | Age: 47
End: 2022-11-10

## 2022-12-14 DIAGNOSIS — I10 ESSENTIAL HYPERTENSION, BENIGN: ICD-10-CM

## 2022-12-14 RX ORDER — HYDROCHLOROTHIAZIDE 25 MG/1
TABLET ORAL
Qty: 30 TABLET | Refills: 1 | Status: SHIPPED | OUTPATIENT
Start: 2022-12-14 | End: 2022-12-19

## 2022-12-15 DIAGNOSIS — I10 ESSENTIAL HYPERTENSION, BENIGN: ICD-10-CM

## 2022-12-15 RX ORDER — CANDESARTAN 32 MG/1
32 TABLET ORAL DAILY
Qty: 90 TABLET | Refills: 0 | Status: SHIPPED | OUTPATIENT
Start: 2022-12-15

## 2022-12-19 DIAGNOSIS — I10 ESSENTIAL HYPERTENSION, BENIGN: ICD-10-CM

## 2022-12-19 RX ORDER — HYDROCHLOROTHIAZIDE 25 MG/1
25 TABLET ORAL DAILY
Qty: 90 TABLET | Refills: 0 | Status: SHIPPED | OUTPATIENT
Start: 2022-12-19

## 2022-12-20 RX ORDER — ZOLPIDEM TARTRATE 10 MG/1
10 TABLET ORAL NIGHTLY
Qty: 30 TABLET | Refills: 1 | Status: SHIPPED | OUTPATIENT
Start: 2022-12-20

## 2023-01-19 ENCOUNTER — PATIENT MESSAGE (OUTPATIENT)
Dept: PAIN CLINIC | Facility: CLINIC | Age: 48
End: 2023-01-19

## 2023-01-19 RX ORDER — ZOLPIDEM TARTRATE 10 MG/1
10 TABLET ORAL NIGHTLY
Qty: 30 TABLET | Refills: 1 | OUTPATIENT
Start: 2023-01-19

## 2023-01-19 RX ORDER — PANTOPRAZOLE SODIUM 40 MG/1
TABLET, DELAYED RELEASE ORAL
Qty: 90 TABLET | Refills: 0 | Status: SHIPPED | OUTPATIENT
Start: 2023-01-19

## 2023-01-19 NOTE — TELEPHONE ENCOUNTER
----- Message from ALVIN Pelayo sent at 5/13/2019 11:30 AM CDT -----  -all labs norm or acceptable ranges   Pt scheduled for follow up with Diego Perales at 1:30 on 1/25/23

## 2023-01-19 NOTE — TELEPHONE ENCOUNTER
From: Pablito Go  To: TANIA Farr  Sent: 1/19/2023 10:19 AM CST  Subject: I got a spinal injection    I got my injection in September. I'm just curious Ellen Spencer my back is starting to act up again. It had been good for a few months. Can I get another one of those or do I have to do something else?

## 2023-01-25 ENCOUNTER — OFFICE VISIT (OUTPATIENT)
Dept: PAIN CLINIC | Facility: CLINIC | Age: 48
End: 2023-01-25
Payer: COMMERCIAL

## 2023-01-25 VITALS — DIASTOLIC BLOOD PRESSURE: 80 MMHG | HEART RATE: 81 BPM | SYSTOLIC BLOOD PRESSURE: 148 MMHG | OXYGEN SATURATION: 96 %

## 2023-01-25 DIAGNOSIS — M48.062 SPINAL STENOSIS OF LUMBAR REGION WITH NEUROGENIC CLAUDICATION: Primary | ICD-10-CM

## 2023-01-25 PROCEDURE — 99214 OFFICE O/P EST MOD 30 MIN: CPT | Performed by: PHYSICIAN ASSISTANT

## 2023-01-25 PROCEDURE — 3077F SYST BP >= 140 MM HG: CPT | Performed by: PHYSICIAN ASSISTANT

## 2023-01-25 PROCEDURE — 3079F DIAST BP 80-89 MM HG: CPT | Performed by: PHYSICIAN ASSISTANT

## 2023-01-25 NOTE — PROGRESS NOTES
Patient presents in office today with reported pain in left side low back    Current pain level reported = 3/10    Last reported dose of n/a      Narcotic Contract renewal n/a    Urine Drug screen n/a

## 2023-02-21 RX ORDER — ZOLPIDEM TARTRATE 10 MG/1
10 TABLET ORAL NIGHTLY
Qty: 30 TABLET | Refills: 1 | Status: SHIPPED | OUTPATIENT
Start: 2023-02-21

## 2023-03-17 DIAGNOSIS — I10 ESSENTIAL HYPERTENSION, BENIGN: ICD-10-CM

## 2023-03-17 RX ORDER — HYDROCHLOROTHIAZIDE 25 MG/1
25 TABLET ORAL DAILY
Qty: 90 TABLET | Refills: 0 | OUTPATIENT
Start: 2023-03-17

## 2023-03-17 NOTE — TELEPHONE ENCOUNTER
Appt. Scheduled for 4/3- Does not need refill at time has 1/2 bottle left of med. Will call or send [facility] if refill is needed    Hydrochlorothiazide 25 mg  Hypertension Medications Protocol Failed 03/17/2023 02:30 AM   Protocol Details  Appointment in past 6 or next 3 months   Filled 12-19-22  Qty 90  0 refills  No upcoming appt.   LOV 8-8-22 TO

## 2023-03-18 DIAGNOSIS — I10 ESSENTIAL HYPERTENSION, BENIGN: ICD-10-CM

## 2023-03-18 RX ORDER — CANDESARTAN 32 MG/1
TABLET ORAL
Qty: 30 TABLET | Refills: 0 | Status: SHIPPED | OUTPATIENT
Start: 2023-03-18

## 2023-03-24 RX ORDER — ZOLPIDEM TARTRATE 10 MG/1
10 TABLET ORAL NIGHTLY
Qty: 30 TABLET | Refills: 1 | OUTPATIENT
Start: 2023-03-24

## 2023-04-03 ENCOUNTER — OFFICE VISIT (OUTPATIENT)
Dept: INTERNAL MEDICINE CLINIC | Facility: CLINIC | Age: 48
End: 2023-04-03
Payer: COMMERCIAL

## 2023-04-03 VITALS
BODY MASS INDEX: 37.51 KG/M2 | HEIGHT: 70 IN | TEMPERATURE: 98 F | DIASTOLIC BLOOD PRESSURE: 84 MMHG | WEIGHT: 262 LBS | HEART RATE: 84 BPM | OXYGEN SATURATION: 97 % | SYSTOLIC BLOOD PRESSURE: 138 MMHG

## 2023-04-03 DIAGNOSIS — Z12.11 COLON CANCER SCREENING: ICD-10-CM

## 2023-04-03 DIAGNOSIS — R73.03 PRE-DIABETES: ICD-10-CM

## 2023-04-03 DIAGNOSIS — R91.1 PULMONARY NODULE: ICD-10-CM

## 2023-04-03 DIAGNOSIS — I10 ESSENTIAL HYPERTENSION, BENIGN: Primary | ICD-10-CM

## 2023-04-03 PROCEDURE — 3008F BODY MASS INDEX DOCD: CPT | Performed by: FAMILY MEDICINE

## 2023-04-03 PROCEDURE — 3075F SYST BP GE 130 - 139MM HG: CPT | Performed by: FAMILY MEDICINE

## 2023-04-03 PROCEDURE — 99214 OFFICE O/P EST MOD 30 MIN: CPT | Performed by: FAMILY MEDICINE

## 2023-04-03 PROCEDURE — 3079F DIAST BP 80-89 MM HG: CPT | Performed by: FAMILY MEDICINE

## 2023-04-24 RX ORDER — PANTOPRAZOLE SODIUM 40 MG/1
TABLET, DELAYED RELEASE ORAL
Qty: 90 TABLET | Refills: 0 | Status: SHIPPED | OUTPATIENT
Start: 2023-04-24

## 2023-04-26 RX ORDER — ZOLPIDEM TARTRATE 10 MG/1
10 TABLET ORAL NIGHTLY
Qty: 30 TABLET | Refills: 1 | Status: SHIPPED | OUTPATIENT
Start: 2023-04-26

## 2023-05-19 ENCOUNTER — TELEMEDICINE (OUTPATIENT)
Dept: PAIN CLINIC | Facility: CLINIC | Age: 48
End: 2023-05-19
Payer: COMMERCIAL

## 2023-05-19 DIAGNOSIS — M48.062 SPINAL STENOSIS OF LUMBAR REGION WITH NEUROGENIC CLAUDICATION: Primary | ICD-10-CM

## 2023-05-22 ENCOUNTER — TELEPHONE (OUTPATIENT)
Dept: NEUROLOGY | Facility: CLINIC | Age: 48
End: 2023-05-22

## 2023-05-22 DIAGNOSIS — M54.16 LUMBAR RADICULITIS: Primary | ICD-10-CM

## 2023-05-22 NOTE — TELEPHONE ENCOUNTER
Prior authorization request completed for: TRACY   Authorization # NO PRIOR AUTHORIZATION / PREDETERMINATION REQUIRED / VALID AND BILLABLE  Authorization dates: N/A  CPT codes approved: 20566  Number of visits/dates of service approved: 1  Physician: Dr. Ledy Ghosh   Location: [facility]     Call Ref#: [id number]  Representative Name: Melva Dee     Patient can be scheduled. Routed to Navigator.

## 2023-05-23 RX ORDER — TESTOSTERONE CYPIONATE 200 MG/ML
100 INJECTION, SOLUTION INTRAMUSCULAR SEE ADMIN INSTRUCTIONS
Refills: 0 | OUTPATIENT
Start: 2023-05-23

## 2023-05-23 NOTE — TELEPHONE ENCOUNTER
Zolpidem 10 mg  Filled 4-26-23  Qty 30  1 refill  Upcoming appt. 10-3-23   LOV 4-3-23 TO    Testerone soln.   Filled by Dr. Luis Palacios 3-23-23   Qty 4  0 refills

## 2023-05-24 RX ORDER — ZOLPIDEM TARTRATE 10 MG/1
10 TABLET ORAL NIGHTLY
Qty: 30 TABLET | Refills: 1 | Status: SHIPPED | OUTPATIENT
Start: 2023-05-24

## 2023-06-08 ENCOUNTER — HOSPITAL ENCOUNTER (OUTPATIENT)
Facility: HOSPITAL | Age: 48
Setting detail: HOSPITAL OUTPATIENT SURGERY
Discharge: HOME | End: 2023-06-08
Attending: ANESTHESIOLOGY | Admitting: ANESTHESIOLOGY
Payer: COMMERCIAL

## 2023-06-08 ENCOUNTER — APPOINTMENT (OUTPATIENT)
Dept: GENERAL RADIOLOGY | Facility: HOSPITAL | Age: 48
End: 2023-06-08
Attending: ANESTHESIOLOGY
Payer: COMMERCIAL

## 2023-06-08 VITALS
WEIGHT: 262 LBS | TEMPERATURE: 97 F | OXYGEN SATURATION: 97 % | HEIGHT: 70 IN | DIASTOLIC BLOOD PRESSURE: 90 MMHG | SYSTOLIC BLOOD PRESSURE: 140 MMHG | HEART RATE: 80 BPM | RESPIRATION RATE: 16 BRPM | BODY MASS INDEX: 37.51 KG/M2

## 2023-06-08 PROCEDURE — 3E0S33Z INTRODUCTION OF ANTI-INFLAMMATORY INTO EPIDURAL SPACE, PERCUTANEOUS APPROACH: ICD-10-PCS | Performed by: ANESTHESIOLOGY

## 2023-06-08 PROCEDURE — 62323 NJX INTERLAMINAR LMBR/SAC: CPT | Performed by: ANESTHESIOLOGY

## 2023-06-08 PROCEDURE — 3E0S3BZ INTRODUCTION OF ANESTHETIC AGENT INTO EPIDURAL SPACE, PERCUTANEOUS APPROACH: ICD-10-PCS | Performed by: ANESTHESIOLOGY

## 2023-06-08 RX ORDER — DEXAMETHASONE SODIUM PHOSPHATE 10 MG/ML
INJECTION, SOLUTION INTRAMUSCULAR; INTRAVENOUS
Status: DISCONTINUED | OUTPATIENT
Start: 2023-06-08 | End: 2023-06-08

## 2023-06-08 RX ORDER — SODIUM CHLORIDE, SODIUM LACTATE, POTASSIUM CHLORIDE, CALCIUM CHLORIDE 600; 310; 30; 20 MG/100ML; MG/100ML; MG/100ML; MG/100ML
100 INJECTION, SOLUTION INTRAVENOUS CONTINUOUS
Status: CANCELLED | OUTPATIENT
Start: 2023-06-08

## 2023-06-08 RX ORDER — SODIUM CHLORIDE 9 MG/ML
INJECTION INTRAVENOUS
Status: DISCONTINUED | OUTPATIENT
Start: 2023-06-08 | End: 2023-06-08

## 2023-06-08 RX ORDER — LIDOCAINE HYDROCHLORIDE 10 MG/ML
INJECTION, SOLUTION EPIDURAL; INFILTRATION; INTRACAUDAL; PERINEURAL
Status: DISCONTINUED | OUTPATIENT
Start: 2023-06-08 | End: 2023-06-08

## 2023-06-08 NOTE — OPERATIVE REPORT
[facility]  Operative Report  2023     Lauren Hong Patient Status:  Hospital Outpatient Surgery    10/12/1975 MRN [id number]   Location [address] [address] Attending Bartolome Ruiz MD   Hosp Day # 0 PCP Treasure Rodney MD     Indication: Mary Jane Barber is a 50year old male with lumbar radiculitis    Preoperative Diagnosis:  Lumbar radiculitis [M54.16]    Postoperative Diagnosis: Same as above. Procedure performed: LUMBAR INTERLAMINAR EPIDURAL INJECTION with local     Anesthesia: Local .    EBL: Less than 1 ml. Procedure Description:  After reviewing the patient's history and performing a focused physical examination, the diagnosis and positive previous diagnostic tests were confirmed and contraindications such as infection and coagulopathy were ruled out. Following review of allergies and potential side effects and complications, including but not necessarily limited to infection, allergic reaction, local tissue breakdown, nerve injury, post-dural puncture headache and paresis, the patient consented for the procedure. The patient was brought to the procedure room in prone position. After sterile prep of the lumbar spine, the L3-4 interspace was identified with the help of fluoroscopy. Local anesthetic was given by a 25 gauge 1.5 inch needle with 1% lidocaine in that space level. Thereafter, a 20 gauge Tuohy needle was introduced and advanced under fluoroscopy. The epidural space was accessed by using loss of resistance to air technique. The needle position was confirmed with AP and lateral view under fluoroscopy. Omnipaque 180 was injected in 1 mL volume. A good epidurogram was obtained. Thereafter, dexamethasone 10 mg with normal saline of 5 mL in total volume of 6 mL was injected through the Tuohy needle. The needle was flushed with 1 mL lidocaine. The needle was withdrawn with the stylet intact in situ. The needle's tip was intact.   The patient tolerated the procedure very well without significant immediate complication. The patient's back was cleaned and sterile dressing was applied. The patient was discharged with an instruction to a responsible adult after discharge criteria were met. The patient was advised to contact us should any problems happen. The patient was also informed to go to the emergency room immediately if experiencing severe numbness or weakness in the extremities or experiencing bowel or bladder incontinence. Complications: None. Follow up: The patient was followed in the pain clinic as needed basis.           Zabrina Harper MD

## 2023-06-08 NOTE — DISCHARGE INSTRUCTIONS
Home Care Instructions Following Your Pain Procedure     Jace,  It has been a pleasure to have you as our patient. To help you at home, you must follow these general discharge instructions. We will review these with you before you are discharged. It is our hope that you have a complete and uneventful recovery from our procedure. General Instructions:  What to Expect:  Bandages from your procedure today can be removed when you get home. Please avoid soaking and/or swimming for 24 hours. Showering is okay  It is normal to have increased pain symptoms and/or pain at injection site for up to 3-5 days after procedure, you can use heat or ice (20 minutes on 20 minutes off) for comfort. You may experience some temporary side effects which may include restlessness or insomnia, flushing of the face, or heart palpitations. Please contact the provider if these symptoms do not resolve within 3-4 days. Lightheadedness or nausea may occur and should resolve within 24 to 48 hours. If you develop a headache after treatment, rest, drink fluids (with caffeine, if possible) and take mild over-the-counter pain medication. If the headache does not improve with the above treatment, contact the physician. Home Medications:  Resume all previously prescribed medication. Please avoid taking NSAIDs (Non-Steriodal Anti-Inflammatory Drugs) such as:  Ibuprofen ( Advil, Motrin) Aleve (Naproxen), Diclofenac, Meloxicam for 6 hours after procedure. If you are on Coumadin (Warfarin) or any other anti-coagulant (or \"blood thinning\") medication such as Plavix (Clopidogrel), Xarelto (Rivaroxaban), Eliquis (Apixaban), Effient (Prasugrel) etc., restart on the following day from the procedure unless otherwise directed by your provider. If you are a diabetic, please increase the frequency of your glucose monitoring after the procedure as steroids may cause a temporary (2-3 day) increase in your blood sugar.   Contact your primary care physician if your blood sugar remains elevated as you may require some medication adjustment. Diet:  Resume your regular diet as tolerated. Activity: We recommend that you relax and rest during the rest of your procedure day. If you feel weakness in your arms or legs do not drive. Follow-up Appointment  Please schedule a follow-up visit within 3 to 4 weeks after your last procedure date. Question or Concerns:  Feel free to call our office with any questions or concerns at [phone] (option #2)    Jace  Thank you for coming to [facility] for your procedure. The nurses try very hard to make sure you receive the best care possible. Your trust in them as well as us is greatly appreciated.     Thanks so much,   Dr. Juanjose Frederick

## 2023-06-09 ENCOUNTER — TELEPHONE (OUTPATIENT)
Dept: PAIN CLINIC | Facility: CLINIC | Age: 48
End: 2023-06-09

## 2023-06-09 NOTE — TELEPHONE ENCOUNTER
Ivelisse called placed to patient for post procedure follow up. Patient stated he is feeling and has no additional questions. Pt verbalized understanding to call with any questions or concerns.       Procedure: LESI  Date: 06/08/23  Video Visit Follow up Visit Scheduled: 06/22/23 @ 1:30PM with Tk Roberts

## 2023-06-22 ENCOUNTER — TELEMEDICINE (OUTPATIENT)
Dept: PAIN CLINIC | Facility: CLINIC | Age: 48
End: 2023-06-22
Payer: COMMERCIAL

## 2023-06-22 DIAGNOSIS — M47.816 LUMBAR SPONDYLOSIS: Primary | ICD-10-CM

## 2023-06-22 DIAGNOSIS — M48.062 SPINAL STENOSIS OF LUMBAR REGION WITH NEUROGENIC CLAUDICATION: ICD-10-CM

## 2023-06-22 PROCEDURE — 99213 OFFICE O/P EST LOW 20 MIN: CPT | Performed by: PHYSICIAN ASSISTANT

## 2023-06-29 RX ORDER — ZOLPIDEM TARTRATE 10 MG/1
10 TABLET ORAL NIGHTLY
Qty: 30 TABLET | Refills: 1 | OUTPATIENT
Start: 2023-06-29

## 2023-07-24 RX ORDER — PANTOPRAZOLE SODIUM 40 MG/1
40 TABLET, DELAYED RELEASE ORAL
Qty: 90 TABLET | Refills: 0 | Status: SHIPPED | OUTPATIENT
Start: 2023-07-24

## 2023-07-24 NOTE — TELEPHONE ENCOUNTER
Requesting   Name from pharmacy: PANTOPRAZOLE SOD DR 40 MG TAB          Will file in chart as: PANTOPRAZOLE 40 MG Oral Tab EC    Sig: TAKE 1 TABLET BY MOUTH EVERY DAY BEFORE BREAKFAST    Disp: 90 tablet    Refills: 0 (Pharmacy requested: Not specified)    Start: 7/24/2023    Class: Normal    Non-formulary    Last ordered: 3 months ago by Reji Feng MD Last refill: 4/24/2023    Rx #: [id number]     LOV: 4/3/2023  RTC: 6 months   Last Relevant Labs: n/a   Filled: 4/24/2023 #90 with 0 refills    Future Appointments   Date Time Provider Aida Bethea   10/3/2023  3:00 PM CELIA Rojas [facility] [place]

## 2023-07-31 ENCOUNTER — HOSPITAL ENCOUNTER (OUTPATIENT)
Age: 48
Discharge: HOME | End: 2023-07-31
Payer: COMMERCIAL

## 2023-07-31 VITALS
OXYGEN SATURATION: 95 % | RESPIRATION RATE: 18 BRPM | DIASTOLIC BLOOD PRESSURE: 97 MMHG | WEIGHT: 257 LBS | HEART RATE: 81 BPM | SYSTOLIC BLOOD PRESSURE: 152 MMHG | TEMPERATURE: 98 F | BODY MASS INDEX: 37 KG/M2

## 2023-07-31 DIAGNOSIS — K52.9 GASTROENTERITIS: Primary | ICD-10-CM

## 2023-07-31 PROCEDURE — 99213 OFFICE O/P EST LOW 20 MIN: CPT

## 2023-07-31 PROCEDURE — 99214 OFFICE O/P EST MOD 30 MIN: CPT

## 2023-07-31 RX ORDER — ONDANSETRON 4 MG/1
4 TABLET, ORALLY DISINTEGRATING ORAL EVERY 4 HOURS PRN
Qty: 30 TABLET | Refills: 0 | Status: SHIPPED | OUTPATIENT
Start: 2023-07-31

## 2023-07-31 RX ORDER — DICYCLOMINE HCL 20 MG
20 TABLET ORAL 3 TIMES DAILY PRN
Qty: 15 TABLET | Refills: 0 | Status: SHIPPED | OUTPATIENT
Start: 2023-07-31

## 2023-07-31 NOTE — DISCHARGE INSTRUCTIONS
Wash hands often  Disinfect your environment, linens, electronics, etc.  Drink plenty of fluids  Get plenty of rest  Do not share utensils or drinks  Zofran as needed for nausea  Bentyl as needed for abdominal pain  Imodium as needed for watery stools only (over-the-counter; do not take if stools are loose)  Avoid spicy, greasy, fatty, fried, \"fast\" foods  Avoid dairy  Slowly progress diet as tolerated (liquids > smoothies > bananas / toast / apple sauce > others)  Follow up with your doctor as needed

## 2023-07-31 NOTE — ED INITIAL ASSESSMENT (HPI)
Pt with nausea/headache/diarrhea/vomiting since Sat night, after eating chicken sandwich that tasted bad; lower abd cramping    No known fever

## 2023-08-01 DIAGNOSIS — I10 ESSENTIAL HYPERTENSION, BENIGN: ICD-10-CM

## 2023-08-01 RX ORDER — HYDROCHLOROTHIAZIDE 25 MG/1
25 TABLET ORAL DAILY
Qty: 90 TABLET | Refills: 0 | Status: SHIPPED | OUTPATIENT
Start: 2023-08-01

## 2023-08-01 RX ORDER — CANDESARTAN 32 MG/1
32 TABLET ORAL DAILY
Qty: 90 TABLET | Refills: 0 | Status: SHIPPED | OUTPATIENT
Start: 2023-08-01

## 2023-08-01 NOTE — TELEPHONE ENCOUNTER
Candesartan 32 mg  Hypertension Medications Protocol Yqmqgd9008/01/2023 10:09 AM   Protocol Details CMP or BMP in past 12 months   Filled 3-18-23  Qty 30  0 refills  LOV 4-3-23 TO    Hydrochlorothiazide 25 mg  Hypertension Medications Protocol Xvmhwn6308/01/2023 10:09 AM   Protocol Details CMP or BMP in past 12 months   Filled 12-19-22  Qty 90  0 refills  Future Appointments   Date Time Provider Aida Bethea   10/3/2023  3:00 PM CELIA Claudio [facility] [place]   LOV 4-3-23 TO

## 2023-08-23 ENCOUNTER — TELEPHONE (OUTPATIENT)
Dept: INTERNAL MEDICINE CLINIC | Facility: CLINIC | Age: 48
End: 2023-08-23

## 2023-08-23 DIAGNOSIS — R91.1 PULMONARY NODULE: Primary | ICD-10-CM

## 2023-08-24 RX ORDER — ZOLPIDEM TARTRATE 10 MG/1
10 TABLET ORAL NIGHTLY
Qty: 30 TABLET | Refills: 1 | Status: SHIPPED | OUTPATIENT
Start: 2023-08-24

## 2023-08-24 NOTE — TELEPHONE ENCOUNTER
Requested Prescriptions     Pending Prescriptions Disp Refills    zolpidem 10 MG Oral Tab 30 tablet 1     Sig: Take 1 tablet (10 mg total) by mouth nightly.      No protocol     LOV 4/3/23  RTC 1 year  Filled 5/24/23 30 tabs 1 refill   Future Appointments   Date Time Provider Aida Bethea   10/3/2023  3:00 PM CELIA Bojorquez [facility] [place]

## 2023-09-14 ENCOUNTER — HOSPITAL ENCOUNTER (OUTPATIENT)
Dept: CT IMAGING | Age: 48
Discharge: HOME | End: 2023-09-14
Attending: FAMILY MEDICINE
Payer: COMMERCIAL

## 2023-09-14 DIAGNOSIS — R91.1 PULMONARY NODULE: ICD-10-CM

## 2023-09-14 PROCEDURE — 71250 CT THORAX DX C-: CPT | Performed by: FAMILY MEDICINE

## 2023-09-27 RX ORDER — ZOLPIDEM TARTRATE 10 MG/1
10 TABLET ORAL NIGHTLY
Qty: 30 TABLET | Refills: 1 | Status: SHIPPED | OUTPATIENT
Start: 2023-09-27

## 2023-09-27 NOTE — TELEPHONE ENCOUNTER
Zolpidem 10 mg  Filled 8-24-23  Qty 30  1 refill  Future Appointments   Date Time Provider Aida Bethea   10/3/2023  3:00 PM [facility]

## 2023-09-29 ENCOUNTER — TELEPHONE (OUTPATIENT)
Dept: INTERNAL MEDICINE CLINIC | Facility: CLINIC | Age: 48
End: 2023-09-29

## 2023-09-29 NOTE — TELEPHONE ENCOUNTER
Placed in out going mail per instructions    Placed copy to scan as well as in accordion folder in Pod 3

## 2023-10-03 ENCOUNTER — TELEPHONE (OUTPATIENT)
Dept: INTERNAL MEDICINE CLINIC | Facility: CLINIC | Age: 48
End: 2023-10-03

## 2023-10-03 ENCOUNTER — OFFICE VISIT (OUTPATIENT)
Dept: INTERNAL MEDICINE CLINIC | Facility: CLINIC | Age: 48
End: 2023-10-03
Payer: COMMERCIAL

## 2023-10-03 VITALS
SYSTOLIC BLOOD PRESSURE: 138 MMHG | OXYGEN SATURATION: 95 % | HEART RATE: 82 BPM | WEIGHT: 264.81 LBS | DIASTOLIC BLOOD PRESSURE: 88 MMHG | TEMPERATURE: 98 F | BODY MASS INDEX: 38 KG/M2

## 2023-10-03 DIAGNOSIS — Z01.84 IMMUNITY STATUS TESTING: ICD-10-CM

## 2023-10-03 DIAGNOSIS — I10 ESSENTIAL HYPERTENSION, BENIGN: Primary | ICD-10-CM

## 2023-10-03 DIAGNOSIS — Z00.00 LABORATORY EXAMINATION ORDERED AS PART OF A ROUTINE GENERAL MEDICAL EXAMINATION: ICD-10-CM

## 2023-10-03 DIAGNOSIS — Z12.11 ENCOUNTER FOR SCREENING FECAL OCCULT BLOOD TESTING: ICD-10-CM

## 2023-10-03 PROCEDURE — 3079F DIAST BP 80-89 MM HG: CPT | Performed by: FAMILY MEDICINE

## 2023-10-03 PROCEDURE — 90471 IMMUNIZATION ADMIN: CPT | Performed by: FAMILY MEDICINE

## 2023-10-03 PROCEDURE — 90686 IIV4 VACC NO PRSV 0.5 ML IM: CPT | Performed by: FAMILY MEDICINE

## 2023-10-03 PROCEDURE — 3075F SYST BP GE 130 - 139MM HG: CPT | Performed by: FAMILY MEDICINE

## 2023-10-03 PROCEDURE — 99213 OFFICE O/P EST LOW 20 MIN: CPT | Performed by: FAMILY MEDICINE

## 2023-10-22 DIAGNOSIS — I10 ESSENTIAL HYPERTENSION, BENIGN: ICD-10-CM

## 2023-10-23 RX ORDER — CANDESARTAN 32 MG/1
32 TABLET ORAL DAILY
Qty: 90 TABLET | Refills: 0 | Status: SHIPPED | OUTPATIENT
Start: 2023-10-23

## 2023-10-23 NOTE — TELEPHONE ENCOUNTER
Requesting   Name from pharmacy: CANDESARTAN CILEXETIL 32 MG TB          Will file in chart as: CANDESARTAN 32 MG Oral Tab    Sig: TAKE 1 TABLET BY MOUTH DAILY. Disp: 90 tablet    Refills: 0 (Pharmacy requested: Not specified)    Start: 10/22/2023    Class: Normal    Non-formulary For: Essential hypertension, benign    Last ordered: 2 months ago (8/1/2023) by Champ Yap MD    Last refill: 8/1/2023    Rx #: [id number]    Hypertension Medications Protocol Qzyvmf37/22/2023 11:11 AM   Protocol Details CMP or BMP in past 12 months    Last serum creatinine< 2.0    Appointment in past 6 or next 3 months        LOV: 10/3/2023  RTC: 1-3 months    Last Relevant Labs: 5/7/2022  Filled: 8/1/2023 #90 with 0 refills    No future appointments.

## 2023-10-26 RX ORDER — PANTOPRAZOLE SODIUM 40 MG/1
40 TABLET, DELAYED RELEASE ORAL
Qty: 90 TABLET | Refills: 0 | Status: SHIPPED | OUTPATIENT
Start: 2023-10-26

## 2023-10-26 NOTE — TELEPHONE ENCOUNTER
Requesting    Name from pharmacy: PANTOPRAZOLE SOD DR 40 MG TAB         Will file in chart as: PANTOPRAZOLE 40 MG Oral Tab EC    Sig: TAKE 1 TABLET BY MOUTH BEFORE BREAKFAST    Disp: 90 tablet    Refills: 0 (Pharmacy requested: Not specified)    Start: 10/26/2023    Class: Normal    Non-formulary    Last ordered: 3 months ago (7/24/2023) by Shanda Cordero MD    Last refill: 7/24/2023     LOV: 10/3/2023  RTC: 1-3 months   Last Relevant Labs: n/a   Filled: 7/24/2023 #90 with 0 refills    No future appointments.

## 2023-10-29 DIAGNOSIS — I10 ESSENTIAL HYPERTENSION, BENIGN: ICD-10-CM

## 2023-10-30 RX ORDER — HYDROCHLOROTHIAZIDE 25 MG/1
25 TABLET ORAL DAILY
Qty: 90 TABLET | Refills: 0 | Status: SHIPPED | OUTPATIENT
Start: 2023-10-30

## 2023-10-30 NOTE — TELEPHONE ENCOUNTER
Requesting   Name from pharmacy: HYDROCHLOROTHIAZIDE 25 MG TAB          Will file in chart as: HYDROCHLOROTHIAZIDE 25 MG Oral Tab    Sig: Take 1 tablet (25 mg total) by mouth daily. Disp: 90 tablet    Refills: 0 (Pharmacy requested: Not specified)    Start: 10/29/2023    Class: Normal    Non-formulary For: Essential hypertension, benign    Last ordered: 3 months ago (8/1/2023) by Jaden Rogers MD    Last refill: 8/1/2023    Rx #: [id number]    Hypertension Medications Protocol Ewlrvq57/29/2023 08:11 AM   Protocol Details CMP or BMP in past 12 months    Last serum creatinine< 2.0    Appointment in past 6 or next 3 months        LOV: 10/3/2023  RTC: 1-3 months  Last Relevant Labs: 5/7/2022  Filled: 8/1/2023 #90 with 0 refills    No future appointments.

## 2023-11-02 RX ORDER — ZOLPIDEM TARTRATE 10 MG/1
10 TABLET ORAL NIGHTLY
Qty: 30 TABLET | Refills: 1 | Status: SHIPPED | OUTPATIENT
Start: 2023-11-02

## 2023-11-02 NOTE — TELEPHONE ENCOUNTER
Requesting    zolpidem 10 MG Oral Tab         Sig: Take 1 tablet (10 mg total) by mouth nightly. Disp: 30 tablet    Refills: 1    Start: 11/2/2023    Class: Normal    Non-formulary    To pharmacy: Not to exceed 4 additional fills before 04/21/2022     LOV: 10/3/2023  RTC: 1-3 months   Last Relevant Labs: n/a   Filled: 9/27/2023 #30 with 1 refills    No future appointments.

## 2023-12-07 RX ORDER — ZOLPIDEM TARTRATE 10 MG/1
10 TABLET ORAL NIGHTLY
Qty: 30 TABLET | Refills: 1 | OUTPATIENT
Start: 2023-12-07

## 2023-12-07 NOTE — TELEPHONE ENCOUNTER
Requesting    zolpidem 10 MG Oral Tab         Sig: Take 1 tablet (10 mg total) by mouth nightly. Disp: 30 tablet    Refills: 1    Start: 12/6/2023    Class: Normal    Non-formulary    To pharmacy: Not to exceed 4 additional fills before 04/21/2022        LOV: 10/3/2023  RTC: 1-3 months   Last Relevant Labs: n/a   Filled: 11/2/2023 #30 with 1 refills    No future appointments.

## 2024-01-09 DIAGNOSIS — Z00.00 WELLNESS EXAMINATION: Primary | ICD-10-CM

## 2024-01-10 RX ORDER — ZOLPIDEM TARTRATE 10 MG/1
10 TABLET ORAL NIGHTLY
Qty: 30 TABLET | Refills: 1 | Status: SHIPPED | OUTPATIENT
Start: 2024-01-10

## 2024-01-10 NOTE — TELEPHONE ENCOUNTER
Requesting   zolpidem 10 MG Oral Tab          Sig: Take 1 tablet (10 mg total) by mouth nightly.    Disp: 30 tablet    Refills: 1    Start: 1/9/2024    Class: Normal    Non-formulary    To pharmacy: Not to exceed 4 additional fills before 04/21/2022     LOV: 10/3/2023  RTC: 1-3 months   Last Relevant Labs: n/a   Filled: 11/2/2023 #30 with 1 refills    No future appointments.

## 2024-01-26 DIAGNOSIS — I10 ESSENTIAL HYPERTENSION, BENIGN: ICD-10-CM

## 2024-01-26 RX ORDER — HYDROCHLOROTHIAZIDE 25 MG/1
25 TABLET ORAL DAILY
Qty: 90 TABLET | Refills: 0 | Status: SHIPPED | OUTPATIENT
Start: 2024-01-26

## 2024-01-26 RX ORDER — PANTOPRAZOLE SODIUM 40 MG/1
40 TABLET, DELAYED RELEASE ORAL
Qty: 90 TABLET | Refills: 0 | Status: SHIPPED | OUTPATIENT
Start: 2024-01-26

## 2024-01-26 RX ORDER — CANDESARTAN 32 MG/1
32 TABLET ORAL DAILY
Qty: 90 TABLET | Refills: 0 | Status: SHIPPED | OUTPATIENT
Start: 2024-01-26

## 2024-01-26 NOTE — TELEPHONE ENCOUNTER
Candesartan 32 mg    Hypertension Medications Protocol Ksuvrf7401/26/2024 12:19 AM   Protocol Details CMP or BMP in past 12 months      Filled 10-23-23  Qty 90  0 refills  No future appointments.  LOV 10-3-23 SM    Hydrochlorothiazide 25 mg    Hypertension Medications Protocol Sbrtra6401/26/2024 12:19 AM   Protocol Details CMP or BMP in past 12 months      Filled 10-30-23  Qty 90  0 refills  No future appointments.  LOV 10-3-23 SM    Pantoprazole 40 mg  Filled 10-26-23  Qty 90  0 refills  No future appointments.  LOV 10-3-23 SM

## 2024-02-12 DIAGNOSIS — Z00.00 WELLNESS EXAMINATION: ICD-10-CM

## 2024-02-14 RX ORDER — ZOLPIDEM TARTRATE 10 MG/1
10 TABLET ORAL NIGHTLY
Qty: 30 TABLET | Refills: 1 | Status: SHIPPED | OUTPATIENT
Start: 2024-02-14

## 2024-02-14 NOTE — TELEPHONE ENCOUNTER
Requesting    zolpidem 10 MG Oral Tab         Sig: Take 1 tablet (10 mg total) by mouth nightly.    Disp: 30 tablet    Refills: 1    Start: 2/12/2024    Class: Normal    Non-formulary For: Wellness examination    To pharmacy: Not to exceed 4 additional fills before 04/21/2022    Last ordered: 1 month ago (1/10/2024) by Mich Iglesias MD    Controlled Substance Medication Ilyxsk3702/12/2024 09:03 PM    This medication is a controlled substance - forward to provider to refill        LOV: 10/3/2023  RTC: none noted   Last Relevant Labs: na  Filled: 1/10/2024 #30 with 1 refills    No future appointments.

## 2024-02-17 ENCOUNTER — LAB ENCOUNTER (OUTPATIENT)
Dept: LAB | Age: 49
End: 2024-02-17
Attending: FAMILY MEDICINE
Payer: COMMERCIAL

## 2024-02-17 DIAGNOSIS — R73.03 PRE-DIABETES: ICD-10-CM

## 2024-02-17 DIAGNOSIS — Z01.84 IMMUNITY STATUS TESTING: ICD-10-CM

## 2024-02-17 DIAGNOSIS — Z00.00 LABORATORY EXAMINATION ORDERED AS PART OF A ROUTINE GENERAL MEDICAL EXAMINATION: ICD-10-CM

## 2024-02-17 DIAGNOSIS — I10 ESSENTIAL HYPERTENSION, BENIGN: ICD-10-CM

## 2024-02-17 LAB
ALBUMIN SERPL-MCNC: 4.6 G/DL (ref 3.4–5)
ALBUMIN/GLOB SERPL: 1.6 {RATIO} (ref 1–2)
ALP LIVER SERPL-CCNC: 110 U/L
ALT SERPL-CCNC: 41 U/L
ANION GAP SERPL CALC-SCNC: 7 MMOL/L (ref 0–18)
AST SERPL-CCNC: 31 U/L (ref 15–37)
BASOPHILS # BLD AUTO: 0.16 X10(3) UL (ref 0–0.2)
BASOPHILS NFR BLD AUTO: 1.9 %
BILIRUB SERPL-MCNC: 0.9 MG/DL (ref 0.1–2)
BUN BLD-MCNC: 13 MG/DL (ref 9–23)
CALCIUM BLD-MCNC: 9.2 MG/DL (ref 8.5–10.1)
CHLORIDE SERPL-SCNC: 101 MMOL/L (ref 98–112)
CHOLEST SERPL-MCNC: 189 MG/DL (ref ?–200)
CO2 SERPL-SCNC: 29 MMOL/L (ref 21–32)
CREAT BLD-MCNC: 1.3 MG/DL
EGFRCR SERPLBLD CKD-EPI 2021: 67 ML/MIN/1.73M2 (ref 60–?)
EOSINOPHIL # BLD AUTO: 0.36 X10(3) UL (ref 0–0.7)
EOSINOPHIL NFR BLD AUTO: 4.3 %
ERYTHROCYTE [DISTWIDTH] IN BLOOD BY AUTOMATED COUNT: 14.7 %
EST. AVERAGE GLUCOSE BLD GHB EST-MCNC: 163 MG/DL (ref 68–126)
FASTING PATIENT LIPID ANSWER: YES
FASTING STATUS PATIENT QL REPORTED: YES
GLOBULIN PLAS-MCNC: 2.9 G/DL (ref 2.8–4.4)
GLUCOSE BLD-MCNC: 127 MG/DL (ref 70–99)
HBA1C MFR BLD: 7.3 % (ref ?–5.7)
HBV SURFACE AB SER QL: NONREACTIVE
HBV SURFACE AB SERPL IA-ACNC: <3.1 MIU/ML
HCT VFR BLD AUTO: 60.5 %
HDLC SERPL-MCNC: 48 MG/DL (ref 40–59)
HGB BLD-MCNC: 21 G/DL
IMM GRANULOCYTES # BLD AUTO: 0.15 X10(3) UL (ref 0–1)
IMM GRANULOCYTES NFR BLD: 1.8 %
LDLC SERPL CALC-MCNC: 111 MG/DL (ref ?–100)
LYMPHOCYTES # BLD AUTO: 1.2 X10(3) UL (ref 1–4)
LYMPHOCYTES NFR BLD AUTO: 14.2 %
MCH RBC QN AUTO: 31.1 PG (ref 26–34)
MCHC RBC AUTO-ENTMCNC: 34.7 G/DL (ref 31–37)
MCV RBC AUTO: 89.5 FL
MONOCYTES # BLD AUTO: 0.58 X10(3) UL (ref 0.1–1)
MONOCYTES NFR BLD AUTO: 6.9 %
NEUTROPHILS # BLD AUTO: 5.98 X10 (3) UL (ref 1.5–7.7)
NEUTROPHILS # BLD AUTO: 5.98 X10(3) UL (ref 1.5–7.7)
NEUTROPHILS NFR BLD AUTO: 70.9 %
NONHDLC SERPL-MCNC: 141 MG/DL (ref ?–130)
OSMOLALITY SERPL CALC.SUM OF ELEC: 286 MOSM/KG (ref 275–295)
PLATELET # BLD AUTO: 248 10(3)UL (ref 150–450)
POTASSIUM SERPL-SCNC: 3.9 MMOL/L (ref 3.5–5.1)
PROT SERPL-MCNC: 7.5 G/DL (ref 6.4–8.2)
RBC # BLD AUTO: 6.76 X10(6)UL
RUBV IGG SER QL: POSITIVE
RUBV IGG SER-ACNC: 234.2 IU/ML (ref 10–?)
SODIUM SERPL-SCNC: 137 MMOL/L (ref 136–145)
TRIGL SERPL-MCNC: 170 MG/DL (ref 30–149)
TSI SER-ACNC: 1 MIU/ML (ref 0.36–3.74)
VLDLC SERPL CALC-MCNC: 29 MG/DL (ref 0–30)
WBC # BLD AUTO: 8.4 X10(3) UL (ref 4–11)

## 2024-02-17 PROCEDURE — 86735 MUMPS ANTIBODY: CPT

## 2024-02-17 PROCEDURE — 86765 RUBEOLA ANTIBODY: CPT

## 2024-02-17 PROCEDURE — 86706 HEP B SURFACE ANTIBODY: CPT

## 2024-02-17 PROCEDURE — 3051F HG A1C>EQUAL 7.0%<8.0%: CPT | Performed by: FAMILY MEDICINE

## 2024-02-17 PROCEDURE — 86787 VARICELLA-ZOSTER ANTIBODY: CPT

## 2024-02-17 PROCEDURE — 80061 LIPID PANEL: CPT

## 2024-02-17 PROCEDURE — 36415 COLL VENOUS BLD VENIPUNCTURE: CPT

## 2024-02-17 PROCEDURE — 84443 ASSAY THYROID STIM HORMONE: CPT

## 2024-02-17 PROCEDURE — 80053 COMPREHEN METABOLIC PANEL: CPT

## 2024-02-17 PROCEDURE — 83036 HEMOGLOBIN GLYCOSYLATED A1C: CPT

## 2024-02-17 PROCEDURE — 86762 RUBELLA ANTIBODY: CPT

## 2024-02-17 PROCEDURE — 85025 COMPLETE CBC W/AUTO DIFF WBC: CPT

## 2024-02-19 LAB
MEV IGG SER-ACNC: 29.2 AU/ML (ref 16.5–?)
MUV IGG SER IA-ACNC: 123 AU/ML (ref 11–?)
VZV IGG SER IA-ACNC: >4000 (ref 165–?)

## 2024-02-21 ENCOUNTER — NURSE ONLY (OUTPATIENT)
Dept: INTERNAL MEDICINE CLINIC | Facility: CLINIC | Age: 49
End: 2024-02-21
Payer: COMMERCIAL

## 2024-02-21 ENCOUNTER — OFFICE VISIT (OUTPATIENT)
Dept: INTERNAL MEDICINE CLINIC | Facility: CLINIC | Age: 49
End: 2024-02-21
Payer: COMMERCIAL

## 2024-02-21 VITALS
HEIGHT: 70 IN | DIASTOLIC BLOOD PRESSURE: 82 MMHG | BODY MASS INDEX: 37.82 KG/M2 | HEART RATE: 84 BPM | WEIGHT: 264.19 LBS | OXYGEN SATURATION: 92 % | SYSTOLIC BLOOD PRESSURE: 128 MMHG | TEMPERATURE: 98 F

## 2024-02-21 DIAGNOSIS — E11.9 TYPE 2 DIABETES MELLITUS WITHOUT COMPLICATION, WITHOUT LONG-TERM CURRENT USE OF INSULIN (HCC): ICD-10-CM

## 2024-02-21 DIAGNOSIS — Z12.5 SPECIAL SCREENING, PROSTATE CANCER: ICD-10-CM

## 2024-02-21 DIAGNOSIS — E34.9 TESTOSTERONE DEFICIENCY: ICD-10-CM

## 2024-02-21 DIAGNOSIS — R71.8 ELEVATED HEMATOCRIT: ICD-10-CM

## 2024-02-21 DIAGNOSIS — I10 ESSENTIAL HYPERTENSION, BENIGN: Primary | ICD-10-CM

## 2024-02-21 DIAGNOSIS — E66.9 OBESITY (BMI 30-39.9): ICD-10-CM

## 2024-02-21 LAB
CREAT UR-SCNC: 270 MG/DL
MICROALBUMIN UR-MCNC: 10.9 MG/DL
MICROALBUMIN/CREAT 24H UR-RTO: 40.4 UG/MG (ref ?–30)

## 2024-02-21 PROCEDURE — 3079F DIAST BP 80-89 MM HG: CPT | Performed by: FAMILY MEDICINE

## 2024-02-21 PROCEDURE — 2033F EYE IMG VALID W/O RTNOPTHY: CPT | Performed by: FAMILY MEDICINE

## 2024-02-21 PROCEDURE — 99214 OFFICE O/P EST MOD 30 MIN: CPT | Performed by: FAMILY MEDICINE

## 2024-02-21 PROCEDURE — 3008F BODY MASS INDEX DOCD: CPT | Performed by: FAMILY MEDICINE

## 2024-02-21 PROCEDURE — 92229 IMG RTA DETC/MNTR DS POC ALY: CPT | Performed by: FAMILY MEDICINE

## 2024-02-21 PROCEDURE — 82043 UR ALBUMIN QUANTITATIVE: CPT | Performed by: FAMILY MEDICINE

## 2024-02-21 PROCEDURE — 3072F LOW RISK FOR RETINOPATHY: CPT | Performed by: FAMILY MEDICINE

## 2024-02-21 PROCEDURE — 3074F SYST BP LT 130 MM HG: CPT | Performed by: FAMILY MEDICINE

## 2024-02-21 PROCEDURE — 82570 ASSAY OF URINE CREATININE: CPT | Performed by: FAMILY MEDICINE

## 2024-02-21 RX ORDER — METFORMIN HYDROCHLORIDE 500 MG/1
500 TABLET, EXTENDED RELEASE ORAL DAILY
Qty: 90 TABLET | Refills: 0 | Status: SHIPPED | OUTPATIENT
Start: 2024-02-21

## 2024-02-21 NOTE — PROGRESS NOTES
Jace Panda is a 48 year old male.  HPI:   Here to go over his labs    Admits to poor eating habits and late eating     since he saw the labs he has changed his habits and eating better   not eating out as much      BP has been high   has doubled the candesartan in the last week     +salt in diet    Breathing OK   no CP     Has stopped the testosterone shots d/t the elevated h/h    Is going to donate blood tomorrow          Current Outpatient Medications   Medication Sig Dispense Refill    metFORMIN  MG Oral Tablet 24 Hr Take 1 tablet (500 mg total) by mouth daily. 90 tablet 0    zolpidem 10 MG Oral Tab Take 1 tablet (10 mg total) by mouth nightly. 30 tablet 1    candesartan 32 MG Oral Tab Take 1 tablet (32 mg total) by mouth daily. (Patient taking differently: Take 1 tablet (32 mg total) by mouth daily. Patient taking 2 tablets) 90 tablet 0    pantoprazole 40 MG Oral Tab EC Take 1 tablet (40 mg total) by mouth before breakfast. 90 tablet 0    hydroCHLOROthiazide 25 MG Oral Tab Take 1 tablet (25 mg total) by mouth daily. 90 tablet 0    Testosterone cypionate (DEPOTESTOTERONE) 200 MG/ML Intramuscular Solution Inject 0.5 mL (100 mg total) into the muscle See Admin Instructions. Every 10 days. (Patient not taking: Reported on 2/21/2024)        Past Medical History:   Diagnosis Date    Carpal tunnel syndrome on both sides     hands, R hand repaired through surgery, L hand unrepaired    Cellulitis     arm    Elevated blood sugar 10/9/2013    Essential hypertension, benign 4/6/2013    Low testosterone     Unspecified essential hypertension       Social History:  Social History     Socioeconomic History    Marital status:    Tobacco Use    Smoking status: Every Day     Packs/day: 1.00     Years: 1.00     Additional pack years: 0.00     Total pack years: 1.00     Types: Cigarettes    Smokeless tobacco: Never    Tobacco comments:     started again 2020, previously Quit 12/2009 1 1/2 ppd since 1991.  Unspecified tabacco product   Vaping Use    Vaping Use: Never used   Substance and Sexual Activity    Alcohol use: Yes     Comment: social    Drug use: Yes     Frequency: 1.0 times per week     Types: Cannabis   Other Topics Concern    Caffeine Concern Yes     Comment: 3-4 cans of big red bulls daily and 8 cans of pop daily.     Exercise Yes     Comment: 6x/week.         REVIEW OF SYSTEMS:   GENERAL HEALTH: feels well otherwise  SKIN: denies rashes  RESPIRATORY: denies shortness    CARDIOVASCULAR: denies chest pain   GI: denies abdominal pain  NEURO: denies headaches    EXAM:   /82   Pulse 84   Temp 97.8 °F (36.6 °C) (Temporal)   Ht 5' 10\" (1.778 m)   Wt 264 lb 3.2 oz (119.8 kg)   SpO2 92%   BMI 37.91 kg/m²   GENERAL: well developed, well nourished,in no apparent distress  SKIN: no rashes  NECK: supple,no adenopathy  LUNGS: clear to auscultation  CARDIO: RRR without murmur  EXTREMITIES: Bilateral barefoot skin diabetic exam is normal, visualized feet and the appearance is normal.  Bilateral monofilament/sensation of both feet is normal.  Pulsation pedal pulse exam of both lower legs/feet is normal as well.        ASSESSMENT AND PLAN:   1. Essential hypertension, benign  Better on kwaku   take meds as directed       2. Type 2 diabetes mellitus without complication, without long-term current use of insulin (HCC)  Eye check done today is fine     discussed labs and how DM can affect his health    glad he is eating better now    start metformin  daily   kwaku 3 o      - Microalb/Creat Ratio, Random Urine; Future  - Diabetic Retinopathy Exam  OU - Both Eyes; Future    3. Elevated hematocrit  To donate blood  stopped the testosterone        4. Testosterone deficiency  Stopped for now     5. Obesity (BMI 30-39.9)  Hopefully metformin will help        F/u 3 mo     The patient indicates understanding of these issues and agrees to the plan.  .

## 2024-03-12 ENCOUNTER — OFFICE VISIT (OUTPATIENT)
Dept: PAIN CLINIC | Facility: CLINIC | Age: 49
End: 2024-03-12
Payer: COMMERCIAL

## 2024-03-12 VITALS — OXYGEN SATURATION: 95 % | DIASTOLIC BLOOD PRESSURE: 82 MMHG | HEART RATE: 90 BPM | SYSTOLIC BLOOD PRESSURE: 128 MMHG

## 2024-03-12 DIAGNOSIS — Z00.00 WELLNESS EXAMINATION: ICD-10-CM

## 2024-03-12 DIAGNOSIS — M48.062 SPINAL STENOSIS OF LUMBAR REGION WITH NEUROGENIC CLAUDICATION: Primary | ICD-10-CM

## 2024-03-12 PROCEDURE — 3074F SYST BP LT 130 MM HG: CPT | Performed by: PHYSICIAN ASSISTANT

## 2024-03-12 PROCEDURE — 99214 OFFICE O/P EST MOD 30 MIN: CPT | Performed by: PHYSICIAN ASSISTANT

## 2024-03-12 PROCEDURE — 3079F DIAST BP 80-89 MM HG: CPT | Performed by: PHYSICIAN ASSISTANT

## 2024-03-12 NOTE — PATIENT INSTRUCTIONS
Refill policies:    Allow 2-3 business days for refills; controlled substances may take longer.  Contact your pharmacy at least 5 days prior to running out of medication and have them send an electronic request or submit request through the “request refill” option in your [employer] account.  Refills are not addressed on weekends; covering physicians do not authorize routine medications on weekends.  No narcotics or controlled substances are refilled after noon on Fridays or by on call physicians.  By law, narcotics must be electronically prescribed.  A 30 day supply with no refills is the maximum allowed.  If your prescription is due for a refill, you may be due for a follow up appointment.  To best provide you care, patients receiving routine medications need to be seen at least once a year.  Patients receiving narcotic/controlled substance medications need to be seen at least once every 3 months.  In the event that your preferred pharmacy does not have the requested medication in stock (e.g. Backordered), it is your responsibility to find another pharmacy that has the requested medication available.  We will gladly send a new prescription to that pharmacy at your request.    Scheduling Tests:    If your physician has ordered radiology tests such as MRI or CT scans, please contact Central Scheduling at [phone] right away to schedule the test.  Once scheduled, the [facility] Centralized Referral Team will work with your insurance carrier to obtain pre-certification or prior authorization.  Depending on your insurance carrier, approval may take 3-10 days.  It is highly recommended patients assure they have received an authorization before having a test performed.  If test is done without insurance authorization, patient may be responsible for the entire amount billed.      Precertification and Prior Authorizations:  If your physician has recommended that you have a procedure or additional testing performed the [facility]  Centralized Referral Team will contact your insurance carrier to obtain pre-certification or prior authorization.    You are strongly encouraged to contact your insurance carrier to verify that your procedure/test has been approved and is a COVERED benefit.  Although the [facility] Centralized Referral Team does its due diligence, the insurance carrier gives the disclaimer that \"Although the procedure is authorized, this does not guarantee payment.\"    Ultimately the patient is responsible for payment.   Thank you for your understanding in this matter.  Paperwork Completion:  If you require FMLA or disability paperwork for your recovery, please make sure to either drop it off or have it faxed to our office at [phone]. Be sure the form has your name and date of birth on it.  The form will be faxed to our Forms Department and they will complete it for you.  There is a 25$ fee for all forms that need to be filled out.  Please be aware there is a 10-14 day turnaround time.  You will need to sign a release of information (LAVELLE) form if your paperwork does not come with one.  You may call the Forms Department with any questions at [phone].  Their fax number is [phone].

## 2024-03-12 NOTE — PROGRESS NOTES
HPI:   Jace Panda presents with complaints of low back pain with anterior thigh pain and numbness to the knee.    The pain is described as moderate numbness, aching that is intermittent.  The patient’s activity level has increased since last visit.  The pain is worst unrelated to time of day.    Changes in condition/history since last visit: here today for f/u, having previously reported excellent relief with repeat LESI on 6/8/23 (previous on 9/6/22).  With time, pain began to return and wishes to repeat procedure.  He has been active with HEP learned in PT, which has been helpful for LE pain, though LBP persists, along with LE numbness/tingling.  Using NSAIDs, to temporary relief.      Last procedure: L3/4 KHADIJAH    Date: 6/8/23 (previous on 9/6/22)    Percentage of relief experienced from the procedure: >50%    Duration of the relief: 8 months     The following activities will increase the patient’s pain: increased activity     The following activities decrease the patient’s pain: limiting activity     Functional Assessment: Patient reports that they are able to complete all of their ADL's such as eating, bathing, using the toilet, dressing and getting up from a bed or a chair independently.    Current Medications:  Current Outpatient Medications   Medication Sig Dispense Refill    metFORMIN  MG Oral Tablet 24 Hr Take 1 tablet (500 mg total) by mouth daily. 90 tablet 0    zolpidem 10 MG Oral Tab Take 1 tablet (10 mg total) by mouth nightly. 30 tablet 1    candesartan 32 MG Oral Tab Take 1 tablet (32 mg total) by mouth daily. (Patient taking differently: Take 1 tablet (32 mg total) by mouth daily. Patient taking 2 tablets) 90 tablet 0    pantoprazole 40 MG Oral Tab EC Take 1 tablet (40 mg total) by mouth before breakfast. 90 tablet 0    hydroCHLOROthiazide 25 MG Oral Tab Take 1 tablet (25 mg total) by mouth daily. 90 tablet 0    Testosterone cypionate (DEPOTESTOTERONE) 200 MG/ML Intramuscular Solution  Inject 0.5 mL (100 mg total) into the muscle See Admin Instructions. Every 10 days. (Patient not taking: Reported on 2/21/2024)        Patient requires assistance with: No assistance required    Reviewed Patient History Dated: 6/8/23 no changes noted    Physical Exam:   /82 (BP Location: Right arm, Patient Position: Sitting, Cuff Size: large)   Pulse 90   SpO2 95%   VAS Pain Score:  /10  General Appearance: Well developed, well nourished, normal build, independent body habitus, no apparent physical disabilities, well groomed    Neurological Exam: WNL-Orientation to time, place and person, normal mood & effect, normal concentration & attention span  Inspection: non-antalgic, no acute distress  Radiology/Lab Test Reviewed: MRI:  L1-L2:  There is diffuse endplate osteophyte, facet hypertrophy and disc bulge.  There is mild to moderate central canal stenosis.  There is moderate subarticular and foraminal stenosis.   L2-L3:  There is diffuse endplate osteophyte, disc bulge and facet hypertrophy.  There is mild central canal stenosis.  There is moderate left subarticular and foraminal stenosis.  There is mild right subarticular and foraminal stenosis.   L3-L4:  There is diffuse endplate osteophyte, disc bulge and facet hypertrophy.  There is mild to moderate central canal stenosis.  There is moderate left subarticular and foraminal stenosis.  There is mild right subarticular and foraminal stenosis.   L4-L5:  There is diffuse disc bulge, endplate osteophyte and facet hypertrophy.  There is no central canal stenosis.  There is moderate right subarticular and foraminal stenosis.  There is mild left subarticular and foraminal stenosis.   L5-S1:  There is facet hypertrophy.  There is right parasagittal disc protrusion and endplate osteophyte.  There is no significant stenosis of central canal or foramina        Lab Results   Component Value Date    WBC 8.4 02/17/2024    WBC 9.7 05/07/2022    WBC 7.0 04/08/2016   No  results found for: \"HEMOGLOBIN\"  Lab Results   Component Value Date    .0 02/17/2024    .0 05/07/2022    .0 04/08/2016     Do you have any known blood/bleeding disorders?  No  Does patient currently take blood thinners?   None  Does patient currently take any antibiotics?   No  Patient educated and verbalized understanding.  Medical Decision Making:   Diagnosis:    Encounter Diagnosis   Name Primary?    Spinal stenosis of lumbar region with neurogenic claudication Yes     Impression: >50% relief with L3-4 epidural steroid injection on 6/8/23 (previous on 9/6/2022), and for 8 months, was very pleased with his response.  With time, pain has begun to return, and is again with radiating left lower extremity symptoms.  With HEP learned in PT, the pain in the left leg has largely improved, though continues with numbness and tingling along the anterior lateral thigh, stopping at the knee.  He has known L3-4 stenosis, and we will simply proceed with repeat L3-4 epidural steroid injection, risks and benefits reviewed in detail.    Plan: L3-4 interlaminar epidural steroid injection at his earliest convenience, pending insurance approval.  Risk and benefits reviewed in detail.  Will see him back in 2 to 3 weeks after procedure.    No orders of the defined types were placed in this encounter.      Meds & Refills for this Visit:  Requested Prescriptions      No prescriptions requested or ordered in this encounter       Imaging & Consults:  None    The patient indicates understanding of these issues and agrees to the plan.    TANIA Cazares

## 2024-03-12 NOTE — PROGRESS NOTES
Patient presents in office today with reported pain in bilateral low back radiating down left leg    Current pain level reported = 2/10    Last reported dose of n/a      Narcotic Contract renewal n/a    Urine Drug screen n/a

## 2024-03-13 ENCOUNTER — TELEPHONE (OUTPATIENT)
Dept: PAIN CLINIC | Facility: CLINIC | Age: 49
End: 2024-03-13

## 2024-03-13 DIAGNOSIS — M54.16 LUMBAR RADICULITIS: Primary | ICD-10-CM

## 2024-03-13 RX ORDER — ZOLPIDEM TARTRATE 10 MG/1
10 TABLET ORAL NIGHTLY
Qty: 30 TABLET | Refills: 1 | Status: SHIPPED | OUTPATIENT
Start: 2024-03-13

## 2024-03-13 NOTE — TELEPHONE ENCOUNTER
Prior authorization request completed for: LESCATALINO (L3/4)  Authorization # no auth needed   Pre-D: no  Exclusions/Restrictions: no  Covered Benefit: yes   Authorization dates: n/a  CPT codes approved: 41586  Number of visits/dates of service approved: 1  Physician: rian  Location: [facility]   Call Ref#: [id number]  Representative Name: michael h  Insurance Carrier: [phone]    Patient can be scheduled. Routed to Navigator.

## 2024-03-13 NOTE — TELEPHONE ENCOUNTER
Requesting   zolpidem 10 MG Oral Tab          Sig: Take 1 tablet (10 mg total) by mouth nightly.    Disp: 30 tablet    Refills: 1    Start: 3/12/2024    Class: Normal    For: Wellness examination    To pharmacy: Not to exceed 4 additional fills before 04/21/2022    Last ordered: 4 weeks ago (2/14/2024) by Mich Iglesias MD    Controlled Substance Medication [id number]/12/2024 08:54 PM    This medication is a controlled substance - forward to provider to refill        LOV: 2/21/2024  RTC: 3 months   Last Relevant Labs: n/a   Filled:  2/14/2024  #30     Future Appointments   Date Time Provider Department Center   5/15/2024  3:00 PM Mich Iglesias MD [facility] [place]

## 2024-03-14 NOTE — TELEPHONE ENCOUNTER
Patient advised of insurance approval to proceed with injections and is agreeable to scheduling. Patient scheduled for procedure, pre-procedure instructions reviewed. Patient prefers Local sedation. Reviewed sedation instructions including No Fasting and No  Required. Patient encouraged but not required to hold Ibuprofen for 24 hours prior to procedure. Patient verbalized understanding of instructions, no further needs at this time.      [facility] PAIN [facility]INIC  PRE-PROCEDURE INSTRUCTIONS WITHOUT SEDATION    Procedure: LESI       Appointment Date: 04/02/2024  Check-In Time: 01:00 PM    Follow-Up Date/Time w/TANIA Chaidez : 04/16/2024 @ 03:30 PM    Prior to the procedure:  Please update us prior to the procedure if you are experiencing any symptoms of infection such as cough, fever, chills, urinary symptoms, or have recently been prescribed antibiotics, have open wounds, have recently had surgery or dental procedures.    Day of Procedure:  **Drivers will be required for patients who receive prescriptions for Valium.    NO FASTING REQUIRED  Please bring your Insurance Card, Photo ID, List of Current Medications and Referral (if applicable) to your appointment.  Please park in the [employer] and follow the signs to the [facility].  Check in at [facility] ([address] [place] [address]) outpatient registration in the [facility].  Please note-No prescriptions will be written by Pain Clinic in [place] on the day of procedure. If you require a refill of medications, please contact the office 48 hours prior to your procedure.  If you have an implanted Spinal Cord or Peripheral Nerve Stimulator: Please remember to turn device off for procedure.        Medication Hold:    Number of days you need to be off for the following medications:    Aggrenox 10 days   Agrylin (Anagrelide) 10 days  Brilinta (Ticagrelor) 7 days  Imbruvica (Ibrutinib) 3 days   Enbrel (Etanercept) 24 hours   Fragmin  (Dalteparin) 24 hours   Pletal (Cilostazol) 7 days  Effient (Prasugrel) 7 days  Pradaxa 10 days  Trental 7 days  Eliquis (Apixaban) 3 days  Xarelto (Rivaroxaban) 3 days  Lovenox (Enoxaparin) 24 hours  Aspirin  Greater than 81mg but less than 325mg   5 days  325mg and greater                  7 days  Coumadin       5 days  Procedure may be cancelled if INR is elevated.   Excedrin (with aspirin) 7 days  Plavix (Clopidogrel)                            7 days    NSAIDs: 24 hours preferred      Ibuprofen (Motrin, Advil, Vicoprofen), Naproxen (Naprosyn, Aleve), Piroxcam (Feldene), Meloxicam (Mobic), Oxaprozin (Daypro), Diclofenac (Voltaren), Indomethacin (Indocin), Etodolac (Lodine), Nabumetone (Relafen), Celebrex (Celecoxib)           HERBAL SUPPLEMENTS  5 days preferred  Fish oil, krill oil, Omega-3, Vascepa, Vitamin E, Turmeric, Garlic                       Insurance Authorization:   Most insurances are now requiring a preauthorization for all procedures.  In the event that your insurance does not authorize your procedure within 48 hours of the scheduled date, your procedure will be cancelled and rescheduled to a later date.  Please contact your insurance carrier to determine what your financial responsibility will be for the procedure(s).      Cancellation/Rescheduling Appointment:   In the event you need to cancel or reschedule your appointment, you must notify the office 24 hours prior.    Post-procedure instructions:        Please schedule a follow up visit within 2 to 4 weeks after your last procedure date   Please call our office with any questions or concerns before or after your procedure at  [phone].  If you are a diabetic, please increase the frequency of your glucose monitoring after the procedure as this may cause a temporary increase in your blood sugar.  Contact your primary care physician if your blood sugar rises as you may require some medication adjustment.  It is normal to have increased pain at  injection site for up to 3-5 days after procedure, you can use heat or ice (20 minutes on 20 minutes off) for comfort.    **To hear a recorded version of these instructions, please call [phone] and follow the prompts.  **Para escuchar las instrucciones en Español, por favor de llamar el loan [phone] opción 4.

## 2024-04-02 ENCOUNTER — APPOINTMENT (OUTPATIENT)
Dept: GENERAL RADIOLOGY | Facility: HOSPITAL | Age: 49
End: 2024-04-02
Attending: ANESTHESIOLOGY
Payer: COMMERCIAL

## 2024-04-02 ENCOUNTER — HOSPITAL ENCOUNTER (OUTPATIENT)
Facility: HOSPITAL | Age: 49
Setting detail: HOSPITAL OUTPATIENT SURGERY
Discharge: HOME | End: 2024-04-02
Attending: ANESTHESIOLOGY | Admitting: ANESTHESIOLOGY
Payer: COMMERCIAL

## 2024-04-02 VITALS
HEART RATE: 85 BPM | TEMPERATURE: 97 F | DIASTOLIC BLOOD PRESSURE: 93 MMHG | OXYGEN SATURATION: 98 % | SYSTOLIC BLOOD PRESSURE: 134 MMHG | RESPIRATION RATE: 18 BRPM

## 2024-04-02 LAB — GLUCOSE BLD-MCNC: 160 MG/DL (ref 70–99)

## 2024-04-02 PROCEDURE — 62323 NJX INTERLAMINAR LMBR/SAC: CPT | Performed by: ANESTHESIOLOGY

## 2024-04-02 PROCEDURE — 3E0U33Z INTRODUCTION OF ANTI-INFLAMMATORY INTO JOINTS, PERCUTANEOUS APPROACH: ICD-10-PCS | Performed by: ANESTHESIOLOGY

## 2024-04-02 RX ORDER — DEXAMETHASONE SODIUM PHOSPHATE 10 MG/ML
INJECTION, SOLUTION INTRAMUSCULAR; INTRAVENOUS
Status: DISCONTINUED | OUTPATIENT
Start: 2024-04-02 | End: 2024-04-02

## 2024-04-02 RX ORDER — LIDOCAINE HYDROCHLORIDE 10 MG/ML
INJECTION, SOLUTION EPIDURAL; INFILTRATION; INTRACAUDAL; PERINEURAL
Status: DISCONTINUED | OUTPATIENT
Start: 2024-04-02 | End: 2024-04-02

## 2024-04-02 RX ORDER — SODIUM CHLORIDE 9 MG/ML
INJECTION, SOLUTION INTRAMUSCULAR; INTRAVENOUS; SUBCUTANEOUS
Status: DISCONTINUED | OUTPATIENT
Start: 2024-04-02 | End: 2024-04-02

## 2024-04-02 NOTE — OPERATIVE REPORT
[facility]  Operative Report  2024     Jace aPnda Patient Status:  Hospital Outpatient Surgery    10/12/1975 MRN [id number]   Location [facility] PAIN CENTER Attending James Kuo MD   Hosp Day # 0 PCP Mich Iglesias MD     Indication: Jace is a 48 year old male lumbar radiculitis    Preoperative Diagnosis:  Lumbar radiculitis [M54.16]    Postoperative Diagnosis: Same as above.    Procedure performed: LUMBAR INTERLAMINAR EPIDURAL INJECTION with local    Anesthesia: Local and IV Sedation.    EBL: Less than 1 ml.    Procedure Description:  After reviewing the patient's history and performing a focused physical examination, the diagnosis and positive previous diagnostic tests were confirmed and contraindications such as infection and coagulopathy were ruled out.  Following review of allergies and potential side effects and complications, including but not necessarily limited to infection, allergic reaction, local tissue breakdown, nerve injury, post-dural puncture headache and paresis, the patient consented for the procedure.    The patient was brought to the procedure room in prone position.    After sterile prep of the lumbar spine, the L3-4 interspace was identified with the help of fluoroscopy. Local anesthetic was given by a 25 gauge 1.5 inch needle with 1% lidocaine in that space level.  Thereafter, a 20 gauge Tuohy needle was introduced and advanced under fluoroscopy.  The epidural space was accessed by using loss of resistance to air technique.  The needle position was confirmed with AP and lateral view under fluoroscopy.  Omnipaque 180 was injected in 1 mL volume. A good epidurogram was obtained.  Thereafter, dexamethasone 10 mg with normal saline of 5 mL in total volume of 6 mL was injected through the Tuohy needle.  The needle was flushed with 1 mL lidocaine.  The needle was withdrawn with the stylet intact in situ.  The needle's tip was intact.  The patient tolerated  the procedure very well without significant immediate complication.  The patient's back was cleaned and sterile dressing was applied.  The patient was discharged with an instruction to a responsible adult after discharge criteria were met.  The patient was advised to contact us should any problems happen.  The patient was also informed to go to the emergency room immediately if experiencing severe numbness or weakness in the extremities or experiencing bowel or bladder incontinence.            Complications: None.    Follow up: The patient was followed in the pain clinic as needed basis.          James Kuo MD

## 2024-04-02 NOTE — DISCHARGE INSTRUCTIONS
Home Care Instructions Following Your Pain Procedure     Jace,  It has been a pleasure to have you as our patient. To help you at home, you must follow these general discharge instructions. We will review these with you before you are discharged. It is our hope that you have a complete and uneventful recovery from our procedure.     General Instructions:  What to Expect:  Bandages from your procedure today can be removed when you get home.  Please avoid soaking and/or swimming for 24 hours.  Showering is okay  It is normal to have increased pain symptoms and/or pain at injection site for up to 3-5 days after procedure, you can use heat or ice (20 minutes on 20 minutes off) for comfort.  You may experience some temporary side effects which may include restlessness or insomnia, flushing of the face, or heart palpitations.  Please contact the provider if these symptoms do not resolve within 3-4 days.  Lightheadedness or nausea may occur and should resolve within 24 to 48 hours.  If you develop a headache after treatment, rest, drink fluids (with caffeine, if possible) and take mild over-the-counter pain medication.  If the headache does not improve with the above treatment, contact the physician.  Home Medications:  Resume all previously prescribed medication.  Please avoid taking NSAIDs (Non-Steriodal Anti-Inflammatory Drugs) such as:  Ibuprofen ( Advil, Motrin) Aleve (Naproxen), Diclofenac, Meloxicam for 6 hours after procedure.   If you are on Coumadin (Warfarin) or any other anti-coagulant (or \"blood thinning\") medication such as Plavix (Clopidogrel), Xarelto (Rivaroxaban), Eliquis (Apixaban), Effient (Prasugrel) etc., restart on the following day from the procedure unless otherwise directed by your provider.  If you are a diabetic, please increase the frequency of your glucose monitoring after the procedure as steroids may cause a temporary (2-3 day) increase in your blood sugar.  Contact your primary care  physician if your blood sugar remains elevated as you may require some medication adjustment.  Diet:  Resume your regular diet as tolerated.  Activity:  We recommend that you relax and rest during the rest of your procedure day.  If you feel weakness in your arms or legs do not drive.  Follow-up Appointment  Please schedule a follow-up visit within 3 to 4 weeks after your last procedure date.  Question or Concerns:  Feel free to call our office with any questions or concerns at [phone] (option #2)    Jace  Thank you for coming to [facility] for your procedure.  The nurses try very hard to make sure you receive the best care possible.  Your trust in them as well as us is greatly appreciated.    Thanks so much,   Dr. James Kuo

## 2024-04-02 NOTE — H&P
History & Physical Examination    Patient Name: Jace Panda  MRN: [id number]  CSN: [id number]  YOB: 1975    Pre-Operative Diagnosis:  Lumbar radiculitis [M54.16]    Present Illness: Lumbar radiculitis    Medications Prior to Admission   Medication Sig Dispense Refill Last Dose    zolpidem 10 MG Oral Tab Take 1 tablet (10 mg total) by mouth nightly. 30 tablet 1 4/1/2024    metFORMIN  MG Oral Tablet 24 Hr Take 1 tablet (500 mg total) by mouth daily. 90 tablet 0 4/1/2024    candesartan 32 MG Oral Tab Take 1 tablet (32 mg total) by mouth daily. (Patient taking differently: Take 1 tablet (32 mg total) by mouth daily. Patient taking 2 tablets) 90 tablet 0 4/1/2024    pantoprazole 40 MG Oral Tab EC Take 1 tablet (40 mg total) by mouth before breakfast. 90 tablet 0 Past Month    hydroCHLOROthiazide 25 MG Oral Tab Take 1 tablet (25 mg total) by mouth daily. 90 tablet 0 4/1/2024    Testosterone cypionate (DEPOTESTOTERONE) 200 MG/ML Intramuscular Solution Inject 0.5 mL (100 mg total) into the muscle See Admin Instructions. Every 10 days.   4/1/2024     No current facility-administered medications for this encounter.       Allergies:   Allergies   Allergen Reactions    Ace Inhibitors Coughing    Vicodin [Hydrocodone-Acetaminophen] NAUSEA ONLY       Past Medical History:   Diagnosis Date    Carpal tunnel syndrome on both sides     hands, R hand repaired through surgery, L hand unrepaired    Cellulitis     arm    Elevated blood sugar 10/9/2013    Essential hypertension, benign 4/6/2013    Low testosterone     Unspecified essential hypertension      Past Surgical History:   Procedure Laterality Date    BACK SURGERY  10/11    Lumbar area, treated with needle and heat 10/11    ORCHIECTOMY   Left 10/2016    [facility]    s/p radiation       OTHER SURGICAL HISTORY      R carpal, Neuroplasty Decompression Median Nerve  At Carpal tunnel     TONSILLECTOMY      As a child    VASECTOMY  10/2016     [facility]      Family History   Problem Relation Age of Onset    Hypertension Father      Social History     Tobacco Use    Smoking status: Every Day     Packs/day: 1.00     Years: 1.00     Additional pack years: 0.00     Total pack years: 1.00     Types: Cigarettes    Smokeless tobacco: Never    Tobacco comments:     started again 2020, previously Quit 12/2009 1 1/2 ppd since 1991. Unspecified tabacco product   Substance Use Topics    Alcohol use: Yes     Comment: social       SYSTEM Check if Review is Normal Check if Physical Exam is Normal If not normal, please explain:   HEENT [x ] [x ]    NECK & BACK [x ] [x ]    HEART [x ] [x ]    LUNGS [x ] [x ]    ABDOMEN [x ] [x ]    UROGENITAL [x ] [x ]    EXTREMITIES [x ] [x ]    OTHER        [ x ] I have discussed the risks and benefits and alternatives with the patient/family.  They understand and agree to proceed with plan of care.  [ x ] I have reviewed the History and Physical done within the last 30 days.  Any changes noted above.    James Kuo MD

## 2024-04-03 ENCOUNTER — TELEPHONE (OUTPATIENT)
Dept: PAIN CLINIC | Facility: CLINIC | Age: 49
End: 2024-04-03

## 2024-04-03 NOTE — TELEPHONE ENCOUNTER
Courtesy called placed to patient for post procedure follow up. Patient stated he is doing well, no questions or concerns. Pt verbalized understanding to call with any questions or concerns.      Procedure:   LUMBAR INTERLAMINAR EPIDURAL INJECTION with local     Date: 4/2/24  Follow up Visit Scheduled: 4/16/24, verbally verified with patient

## 2024-04-16 ENCOUNTER — OFFICE VISIT (OUTPATIENT)
Dept: PAIN CLINIC | Facility: CLINIC | Age: 49
End: 2024-04-16
Payer: COMMERCIAL

## 2024-04-16 VITALS — SYSTOLIC BLOOD PRESSURE: 122 MMHG | OXYGEN SATURATION: 97 % | HEART RATE: 98 BPM | DIASTOLIC BLOOD PRESSURE: 80 MMHG

## 2024-04-16 DIAGNOSIS — M48.062 SPINAL STENOSIS OF LUMBAR REGION WITH NEUROGENIC CLAUDICATION: Primary | ICD-10-CM

## 2024-04-16 DIAGNOSIS — Z00.00 WELLNESS EXAMINATION: ICD-10-CM

## 2024-04-16 PROCEDURE — 3074F SYST BP LT 130 MM HG: CPT | Performed by: PHYSICIAN ASSISTANT

## 2024-04-16 PROCEDURE — 99214 OFFICE O/P EST MOD 30 MIN: CPT | Performed by: PHYSICIAN ASSISTANT

## 2024-04-16 PROCEDURE — 3079F DIAST BP 80-89 MM HG: CPT | Performed by: PHYSICIAN ASSISTANT

## 2024-04-16 NOTE — PROGRESS NOTES
Last procedure: LUMBAR INTERLAMINAR EPIDURAL INJECTION with local   Date: 4/2/24  Percentage of relief obtained: 50%  Duration of relief: current    Current Pain Score: 2/10    Narcotic Contract Exp: n/a

## 2024-04-16 NOTE — PATIENT INSTRUCTIONS
Refill policies:    Allow 2-3 business days for refills; controlled substances may take longer.  Contact your pharmacy at least 5 days prior to running out of medication and have them send an electronic request or submit request through the “request refill” option in your [employer] account.  Refills are not addressed on weekends; covering physicians do not authorize routine medications on weekends.  No narcotics or controlled substances are refilled after noon on Fridays or by on call physicians.  By law, narcotics must be electronically prescribed.  A 30 day supply with no refills is the maximum allowed.  If your prescription is due for a refill, you may be due for a follow up appointment.  To best provide you care, patients receiving routine medications need to be seen at least once a year.  Patients receiving narcotic/controlled substance medications need to be seen at least once every 3 months.  In the event that your preferred pharmacy does not have the requested medication in stock (e.g. Backordered), it is your responsibility to find another pharmacy that has the requested medication available.  We will gladly send a new prescription to that pharmacy at your request.    Scheduling Tests:    If your physician has ordered radiology tests such as MRI or CT scans, please contact Central Scheduling at [phone] right away to schedule the test.  Once scheduled, the [facility] Centralized Referral Team will work with your insurance carrier to obtain pre-certification or prior authorization.  Depending on your insurance carrier, approval may take 3-10 days.  It is highly recommended patients assure they have received an authorization before having a test performed.  If test is done without insurance authorization, patient may be responsible for the entire amount billed.      Precertification and Prior Authorizations:  If your physician has recommended that you have a procedure or additional testing performed the [facility]  Centralized Referral Team will contact your insurance carrier to obtain pre-certification or prior authorization.    You are strongly encouraged to contact your insurance carrier to verify that your procedure/test has been approved and is a COVERED benefit.  Although the [facility] Centralized Referral Team does its due diligence, the insurance carrier gives the disclaimer that \"Although the procedure is authorized, this does not guarantee payment.\"    Ultimately the patient is responsible for payment.   Thank you for your understanding in this matter.  Paperwork Completion:  If you require FMLA or disability paperwork for your recovery, please make sure to either drop it off or have it faxed to our office at [phone]. Be sure the form has your name and date of birth on it.  The form will be faxed to our Forms Department and they will complete it for you.  There is a 25$ fee for all forms that need to be filled out.  Please be aware there is a 10-14 day turnaround time.  You will need to sign a release of information (LAVELLE) form if your paperwork does not come with one.  You may call the Forms Department with any questions at [phone].  Their fax number is [phone].

## 2024-04-17 ENCOUNTER — TELEPHONE (OUTPATIENT)
Dept: PAIN CLINIC | Facility: CLINIC | Age: 49
End: 2024-04-17

## 2024-04-17 DIAGNOSIS — M54.16 LUMBAR RADICULITIS: Primary | ICD-10-CM

## 2024-04-17 RX ORDER — ZOLPIDEM TARTRATE 10 MG/1
10 TABLET ORAL NIGHTLY
Qty: 30 TABLET | Refills: 1 | Status: SHIPPED | OUTPATIENT
Start: 2024-04-17

## 2024-04-17 NOTE — TELEPHONE ENCOUNTER
Prior authorization request completed for: TRACY L3/4  Authorization # no auth needed  Pre-D: no  Exclusions/Restrictions: no  Covered Benefit: yes   Authorization dates: n/a  CPT codes approved: 45271  Number of visits/dates of service approved: 1  Physician: rian  Location: [facility]   Call Ref#: [id number]  Representative Name: [place]  Insurance Carrier: [phone]    Patient can be scheduled. Routed to Navigator.

## 2024-04-17 NOTE — TELEPHONE ENCOUNTER
Patient advised of insurance approval to proceed with injections and is agreeable to scheduling. Patient scheduled for procedure, pre-procedure instructions reviewed. Patient prefers LOCAL sedation. Reviewed sedation instructions including NO FASTING AND NO  REQUIRED. Patient ENCOURAGED BUT NOT REQUIRED to hold NSAIDS for 24 HOURS prior to procedure. Patient verbalized understanding of instructions, no further needs at this time.      [facility] PAIN [facility]  PRE-PROCEDURE INSTRUCTIONS WITHOUT SEDATION    Procedure: LESI       Appointment Date: 04/30/2024  Check-In Time: 02:45 PM      Prior to the procedure:  Please update us prior to the procedure if you are experiencing any symptoms of infection such as cough, fever, chills, urinary symptoms, or have recently been prescribed antibiotics, have open wounds, have recently had surgery or dental procedures.    Day of Procedure:  **Drivers will be required for patients who receive prescriptions for Valium.    NO FASTING REQUIRED  Please bring your Insurance Card, Photo ID, List of Current Medications and Referral (if applicable) to your appointment.  Please park in the [facility] [employer] [facility] and follow the signs to the [facility].  Check in at [facility] ([address] [place] [address]) outpatient registration in the [facility].  Please note-No prescriptions will be written by Pain Clinic in OR on the day of procedure. If you require a refill of medications, please contact the office 48 hours prior to your procedure.  If you have an implanted Spinal Cord or Peripheral Nerve Stimulator: Please remember to turn device off for procedure.        Medication Hold:    Number of days you need to be off for the following medications:    Aggrenox 10 days   Agrylin (Anagrelide) 10 days  Brilinta (Ticagrelor) 7 days  Imbruvica (Ibrutinib) 3 days   Enbrel (Etanercept) 24 hours   Fragmin (Dalteparin) 24 hours   Pletal (Cilostazol) 7 days  Effient (Prasugrel) 7  days  Pradaxa 10 days  Trental 7 days  Eliquis (Apixaban) 3 days  Xarelto (Rivaroxaban) 3 days  Lovenox (Enoxaparin) 24 hours  Aspirin  Greater than 81mg but less than 325mg   5 days  325mg and greater                  7 days  Coumadin       5 days  Procedure may be cancelled if INR is elevated.   Excedrin (with aspirin) 7 days  Plavix (Clopidogrel)                            7 days    NSAIDs: 24 hours preferred      Ibuprofen (Motrin, Advil, Vicoprofen), Naproxen (Naprosyn, Aleve), Piroxcam (Feldene), Meloxicam (Mobic), Oxaprozin (Daypro), Diclofenac (Voltaren), Indomethacin (Indocin), Etodolac (Lodine), Nabumetone (Relafen), Celebrex (Celecoxib)           HERBAL SUPPLEMENTS  5 days preferred  Fish oil, krill oil, Omega-3, Vascepa, Vitamin E, Turmeric, Garlic                       Insurance Authorization:   Most insurances are now requiring a preauthorization for all procedures.  In the event that your insurance does not authorize your procedure within 48 hours of the scheduled date, your procedure will be cancelled and rescheduled to a later date.  Please contact your insurance carrier to determine what your financial responsibility will be for the procedure(s).      Cancellation/Rescheduling Appointment:   In the event you need to cancel or reschedule your appointment, you must notify the office 24 hours prior.    Post-procedure instructions:        Please schedule a follow up visit within 2 to 4 weeks after your last procedure date   Please call our office with any questions or concerns before or after your procedure at  [phone].  If you are a diabetic, please increase the frequency of your glucose monitoring after the procedure as this may cause a temporary increase in your blood sugar.  Contact your primary care physician if your blood sugar rises as you may require some medication adjustment.  It is normal to have increased pain at injection site for up to 3-5 days after procedure, you can use heat or  ice (20 minutes on 20 minutes off) for comfort.    **To hear a recorded version of these instructions, please call [phone] and follow the prompts.  **Para escuchar las instrucciones en Español, por favor de llamar el loan [phone] opción 4.

## 2024-04-17 NOTE — TELEPHONE ENCOUNTER
Requesting    zolpidem 10 MG Oral Tab         Sig: Take 1 tablet (10 mg total) by mouth nightly.    Disp: 30 tablet    Refills: 1    Start: 4/16/2024    Class: Normal    For: Wellness examination    To pharmacy: Not to exceed 4 additional fills before 04/21/2022    Last ordered: 1 month ago (3/13/2024) by Mich Iglesias MD    Controlled Substance Medication [id number]/16/2024 06:01 PM    This medication is a controlled substance - forward to provider to refill        LOV: 2/21/2024  RTC: 3 months   Last Relevant Labs: n/a   Filled: 3/13/2024 #30 with 1 refills    Future Appointments   Date Time Provider Department Center   5/15/2024  3:00 PM Mich Iglesias MD [facility] [place]

## 2024-04-29 DIAGNOSIS — I10 ESSENTIAL HYPERTENSION, BENIGN: ICD-10-CM

## 2024-04-29 RX ORDER — HYDROCHLOROTHIAZIDE 25 MG/1
25 TABLET ORAL DAILY
Qty: 30 TABLET | Refills: 0 | Status: SHIPPED | OUTPATIENT
Start: 2024-04-29

## 2024-04-29 RX ORDER — CANDESARTAN 32 MG/1
32 TABLET ORAL DAILY
Qty: 30 TABLET | Refills: 0 | Status: SHIPPED | OUTPATIENT
Start: 2024-04-29

## 2024-04-29 NOTE — TELEPHONE ENCOUNTER
On LOV 2-21-24, pt reported taking candesartan-2 tablets daily = 64 mg. Did you want to order medication as 1 tablet daily or as patient is taking 2 tablets daily?    Candesartan 32 mg  Filled 1-26-24  Qty 90  0 refills  Future Appointments   Date Time Provider Department Center   5/15/2024  3:00 PM Mich Iglesias MD [facility] [place]   LOV 2-21-24 TO    Hydrochlorothiazide 25 mg  Filled 1-26-24  Qty 90  0 refills  Future Appointments   Date Time Provider Department Center   5/15/2024  3:00 PM Mich Iglesias MD [facility] [place]   LOV 2-21-24 TO

## 2024-04-30 ENCOUNTER — APPOINTMENT (OUTPATIENT)
Dept: GENERAL RADIOLOGY | Facility: HOSPITAL | Age: 49
End: 2024-04-30
Attending: ANESTHESIOLOGY
Payer: COMMERCIAL

## 2024-04-30 ENCOUNTER — HOSPITAL ENCOUNTER (OUTPATIENT)
Facility: HOSPITAL | Age: 49
Setting detail: HOSPITAL OUTPATIENT SURGERY
Discharge: HOME | End: 2024-04-30
Attending: ANESTHESIOLOGY | Admitting: ANESTHESIOLOGY
Payer: COMMERCIAL

## 2024-04-30 VITALS
RESPIRATION RATE: 20 BRPM | HEART RATE: 67 BPM | TEMPERATURE: 97 F | SYSTOLIC BLOOD PRESSURE: 144 MMHG | DIASTOLIC BLOOD PRESSURE: 77 MMHG | OXYGEN SATURATION: 96 %

## 2024-04-30 LAB — GLUCOSE BLD-MCNC: 152 MG/DL (ref 70–99)

## 2024-04-30 PROCEDURE — 3E0U33Z INTRODUCTION OF ANTI-INFLAMMATORY INTO JOINTS, PERCUTANEOUS APPROACH: ICD-10-PCS | Performed by: ANESTHESIOLOGY

## 2024-04-30 PROCEDURE — 62323 NJX INTERLAMINAR LMBR/SAC: CPT | Performed by: ANESTHESIOLOGY

## 2024-04-30 RX ORDER — DEXAMETHASONE SODIUM PHOSPHATE 10 MG/ML
INJECTION, SOLUTION INTRAMUSCULAR; INTRAVENOUS
Status: DISCONTINUED | OUTPATIENT
Start: 2024-04-30 | End: 2024-04-30

## 2024-04-30 RX ORDER — SODIUM CHLORIDE 9 MG/ML
INJECTION, SOLUTION INTRAMUSCULAR; INTRAVENOUS; SUBCUTANEOUS
Status: DISCONTINUED | OUTPATIENT
Start: 2024-04-30 | End: 2024-04-30

## 2024-04-30 RX ORDER — LIDOCAINE HYDROCHLORIDE 10 MG/ML
INJECTION, SOLUTION EPIDURAL; INFILTRATION; INTRACAUDAL; PERINEURAL
Status: DISCONTINUED | OUTPATIENT
Start: 2024-04-30 | End: 2024-04-30

## 2024-04-30 NOTE — DISCHARGE INSTRUCTIONS
Home Care Instructions Following Your Pain Procedure     Jace,  It has been a pleasure to have you as our patient. To help you at home, you must follow these general discharge instructions. We will review these with you before you are discharged. It is our hope that you have a complete and uneventful recovery from our procedure.     General Instructions:  What to Expect:  Bandages from your procedure today can be removed when you get home.  Please avoid soaking and/or swimming for 24 hours.  Showering is okay  It is normal to have increased pain symptoms and/or pain at injection site for up to 3-5 days after procedure, you can use heat or ice (20 minutes on 20 minutes off) for comfort.  You may experience some temporary side effects which may include restlessness or insomnia, flushing of the face, or heart palpitations.  Please contact the provider if these symptoms do not resolve within 3-4 days.  Lightheadedness or nausea may occur and should resolve within 24 to 48 hours.  If you develop a headache after treatment, rest, drink fluids (with caffeine, if possible) and take mild over-the-counter pain medication.  If the headache does not improve with the above treatment, contact the physician.  Home Medications:  Resume all previously prescribed medication.  Please avoid taking NSAIDs (Non-Steriodal Anti-Inflammatory Drugs) such as:  Ibuprofen ( Advil, Motrin) Aleve (Naproxen), Diclofenac, Meloxicam for 6 hours after procedure.   If you are on Coumadin (Warfarin) or any other anti-coagulant (or \"blood thinning\") medication such as Plavix (Clopidogrel), Xarelto (Rivaroxaban), Eliquis (Apixaban), Effient (Prasugrel) etc., restart on the following day from the procedure unless otherwise directed by your provider.  If you are a diabetic, please increase the frequency of your glucose monitoring after the procedure as steroids may cause a temporary (2-3 day) increase in your blood sugar.  Contact your primary care  physician if your blood sugar remains elevated as you may require some medication adjustment.  Diet:  Resume your regular diet as tolerated.  Activity:  We recommend that you relax and rest during the rest of your procedure day.  If you feel weakness in your arms or legs do not drive.  Follow-up Appointment  Please schedule a follow-up visit within 3 to 4 weeks after your last procedure date.  Question or Concerns:  Feel free to call our office with any questions or concerns at [phone] (option #2)    Jace  Thank you for coming to [facility] for your procedure.  The nurses try very hard to make sure you receive the best care possible.  Your trust in them as well as us is greatly appreciated.    Thanks so much,   Dr. James Kuo

## 2024-04-30 NOTE — OPERATIVE REPORT
[facility]  Operative Report  2024     Jace Panda Patient Status:  Hospital Outpatient Surgery    10/12/1975 MRN [id number]   Location [facility] PAIN CENTER Attending James Kuo MD   Hosp Day # 0 PCP Mich Iglesias MD     Indication: Jace is a 48 year old male with lumbar radiculitis    Preoperative Diagnosis:  Lumbar radiculitis [M54.16]    Postoperative Diagnosis: Same as above.    Procedure performed: LUMBAR INTERLAMINAR EPIDURAL INJECTION with local    Anesthesia: Local     EBL: Less than 1 ml.    Procedure Description:  After reviewing the patient's history and performing a focused physical examination, the diagnosis and positive previous diagnostic tests were confirmed and contraindications such as infection and coagulopathy were ruled out.  Following review of allergies and potential side effects and complications, including but not necessarily limited to infection, allergic reaction, local tissue breakdown, nerve injury, post-dural puncture headache and paresis, the patient consented for the procedure.    The patient was brought to the procedure room in prone position.  After sterile prep of the lumbar spine, the L3-4 interspace was identified with the help of fluoroscopy. Local anesthetic was given by a 25 gauge 1.5 inch needle with 1% lidocaine in that space level.  Thereafter, a 20 gauge Tuohy needle was introduced and advanced under fluoroscopy.  The epidural space was accessed by using loss of resistance to air technique.  The needle position was confirmed with AP and lateral view under fluoroscopy.  Omnipaque 180 was injected in 1 mL volume. A good epidurogram was obtained.  Thereafter, dexamethasone 10 mg with normal saline of 5 mL in total volume of 6 mL was injected through the Tuohy needle.  The needle was flushed with 1 mL lidocaine.  The needle was withdrawn with the stylet intact in situ.  The needle's tip was intact.  The patient tolerated the procedure  very well without significant immediate complication.  The patient's back was cleaned and sterile dressing was applied.  The patient was discharged with an instruction to a responsible adult after discharge criteria were met.  The patient was advised to contact us should any problems happen.  The patient was also informed to go to the emergency room immediately if experiencing severe numbness or weakness in the extremities or experiencing bowel or bladder incontinence.            Complications: None.    Follow up: The patient was followed in the pain clinic as needed basis.          James Kuo MD

## 2024-04-30 NOTE — H&P
History & Physical Examination    Patient Name: Jace Panda  MRN: [id number]  CSN: [id number]  YOB: 1975    Pre-Operative Diagnosis:  Lumbar radiculitis [M54.16]    Present Illness: Lumbar radiculitis    Medications Prior to Admission   Medication Sig Dispense Refill Last Dose    candesartan 32 MG Oral Tab Take 1 tablet (32 mg total) by mouth daily. 30 tablet 0     hydroCHLOROthiazide 25 MG Oral Tab Take 1 tablet (25 mg total) by mouth daily. 30 tablet 0     zolpidem 10 MG Oral Tab Take 1 tablet (10 mg total) by mouth nightly. 30 tablet 1     metFORMIN  MG Oral Tablet 24 Hr Take 1 tablet (500 mg total) by mouth daily. 90 tablet 0     pantoprazole 40 MG Oral Tab EC Take 1 tablet (40 mg total) by mouth before breakfast. 90 tablet 0     Testosterone cypionate (DEPOTESTOTERONE) 200 MG/ML Intramuscular Solution Inject 0.5 mL (100 mg total) into the muscle See Admin Instructions. Every 10 days.        No current facility-administered medications for this encounter.       Allergies:   Allergies   Allergen Reactions    Ace Inhibitors Coughing    Vicodin [Hydrocodone-Acetaminophen] NAUSEA ONLY       Past Medical History:    Carpal tunnel syndrome on both sides    hands, R hand repaired through surgery, L hand unrepaired    Cellulitis    arm    Elevated blood sugar    Essential hypertension, benign    Low testosterone    Unspecified essential hypertension     Past Surgical History:   Procedure Laterality Date    Back surgery  10/11    Lumbar area, treated with needle and heat 10/11    Orchiectomy   Left 10/2016    Herminio Vasquez    s/p radiation       Other surgical history      R carpal, Neuroplasty Decompression Median Nerve  At Carpal tunnel     Tonsillectomy      As a child    Vasectomy  10/2016     Herminio Vasquez     Family History   Problem Relation Age of Onset    Hypertension Father      Social History     Tobacco Use    Smoking status: Every Day     Current packs/day: 1.00     Average packs/day: 1  pack/day for 1 year (1.0 ttl pk-yrs)     Types: Cigarettes    Smokeless tobacco: Never    Tobacco comments:     started again 2020, previously Quit 12/2009 1 1/2 ppd since 1991. Unspecified tabacco product   Substance Use Topics    Alcohol use: Yes     Comment: social       SYSTEM Check if Review is Normal Check if Physical Exam is Normal If not normal, please explain:   HEENT [x ] [x ]    NECK & BACK [x ] [x ]    HEART [x ] [x ]    LUNGS [x ] [x ]    ABDOMEN [x ] [x ]    UROGENITAL [x ] [x ]    EXTREMITIES [x ] [x ]    OTHER        [ x ] I have discussed the risks and benefits and alternatives with the patient/family.  They understand and agree to proceed with plan of care.  [ x ] I have reviewed the History and Physical done within the last 30 days.  Any changes noted above.    James Kuo MD

## 2024-05-01 ENCOUNTER — TELEPHONE (OUTPATIENT)
Dept: PAIN CLINIC | Facility: CLINIC | Age: 49
End: 2024-05-01

## 2024-05-01 NOTE — TELEPHONE ENCOUNTER
LM for post-procedure outreach call.  Courtesy call given for patient status and any follow-up questions or concerns.  Encouraged pt call back if needed.  Office number provided.

## 2024-05-12 RX ORDER — PANTOPRAZOLE SODIUM 40 MG/1
40 TABLET, DELAYED RELEASE ORAL
Qty: 90 TABLET | Refills: 0 | Status: SHIPPED | OUTPATIENT
Start: 2024-05-12

## 2024-05-15 ENCOUNTER — OFFICE VISIT (OUTPATIENT)
Dept: INTERNAL MEDICINE CLINIC | Facility: CLINIC | Age: 49
End: 2024-05-15

## 2024-05-15 VITALS
SYSTOLIC BLOOD PRESSURE: 146 MMHG | OXYGEN SATURATION: 98 % | DIASTOLIC BLOOD PRESSURE: 90 MMHG | WEIGHT: 266.13 LBS | BODY MASS INDEX: 38 KG/M2 | HEART RATE: 87 BPM | TEMPERATURE: 98 F

## 2024-05-15 DIAGNOSIS — I10 ESSENTIAL HYPERTENSION, BENIGN: Primary | ICD-10-CM

## 2024-05-15 DIAGNOSIS — R71.8 ELEVATED HEMATOCRIT: ICD-10-CM

## 2024-05-15 DIAGNOSIS — E11.9 TYPE 2 DIABETES MELLITUS WITHOUT COMPLICATION, WITHOUT LONG-TERM CURRENT USE OF INSULIN (HCC): ICD-10-CM

## 2024-05-15 DIAGNOSIS — F17.200 SMOKER: ICD-10-CM

## 2024-05-15 PROCEDURE — 3077F SYST BP >= 140 MM HG: CPT | Performed by: FAMILY MEDICINE

## 2024-05-15 PROCEDURE — 99213 OFFICE O/P EST LOW 20 MIN: CPT | Performed by: FAMILY MEDICINE

## 2024-05-15 PROCEDURE — 3061F NEG MICROALBUMINURIA REV: CPT | Performed by: FAMILY MEDICINE

## 2024-05-15 PROCEDURE — 3060F POS MICROALBUMINURIA REV: CPT | Performed by: FAMILY MEDICINE

## 2024-05-15 PROCEDURE — 3080F DIAST BP >= 90 MM HG: CPT | Performed by: FAMILY MEDICINE

## 2024-05-15 RX ORDER — METFORMIN HYDROCHLORIDE 500 MG/1
500 TABLET, EXTENDED RELEASE ORAL DAILY
Qty: 90 TABLET | Refills: 0 | Status: SHIPPED | OUTPATIENT
Start: 2024-05-15

## 2024-05-15 RX ORDER — AMLODIPINE BESYLATE 5 MG/1
5 TABLET ORAL DAILY
Qty: 90 TABLET | Refills: 0 | Status: SHIPPED | OUTPATIENT
Start: 2024-05-15

## 2024-05-15 NOTE — PROGRESS NOTES
Jace Panda is a 48 year old male.  HPI:   Here for f/u on the BP      has been donating blood to get his blood lower   does feel better when he does this      Breathing is good   no CP    +smoker       Takes BP meds as directed     no issues w them    BP is ok at home  120-30/80 range            Current Outpatient Medications   Medication Sig Dispense Refill    metFORMIN  MG Oral Tablet 24 Hr Take 1 tablet (500 mg total) by mouth daily. 90 tablet 0    amLODIPine 5 MG Oral Tab Take 1 tablet (5 mg total) by mouth daily. 90 tablet 0    pantoprazole 40 MG Oral Tab EC Take 1 tablet (40 mg total) by mouth before breakfast. 90 tablet 0    candesartan 32 MG Oral Tab Take 1 tablet (32 mg total) by mouth daily. 30 tablet 0    hydroCHLOROthiazide 25 MG Oral Tab Take 1 tablet (25 mg total) by mouth daily. 30 tablet 0    zolpidem 10 MG Oral Tab Take 1 tablet (10 mg total) by mouth nightly. 30 tablet 1    Testosterone cypionate (DEPOTESTOTERONE) 200 MG/ML Intramuscular Solution Inject 0.5 mL (100 mg total) into the muscle See Admin Instructions. Every 10 days.        Past Medical History:    Carpal tunnel syndrome on both sides    hands, R hand repaired through surgery, L hand unrepaired    Cellulitis    arm    Elevated blood sugar    Essential hypertension, benign    Low testosterone    Unspecified essential hypertension      Social History:  Social History     Socioeconomic History    Marital status:    Tobacco Use    Smoking status: Every Day     Current packs/day: 1.00     Average packs/day: 1 pack/day for 1 year (1.0 ttl pk-yrs)     Types: Cigarettes    Smokeless tobacco: Never    Tobacco comments:     started again 2020, previously Quit 12/2009 1 1/2 ppd since 1991. Unspecified tabacco product   Vaping Use    Vaping status: Never Used   Substance and Sexual Activity    Alcohol use: Yes     Comment: social    Drug use: Yes     Frequency: 1.0 times per week     Types: Cannabis   Other Topics Concern     Caffeine Concern Yes     Comment: 3-4 cans of big red bulls daily and 8 cans of pop daily.     Exercise Yes     Comment: 6x/week.         REVIEW OF SYSTEMS:   GENERAL HEALTH: feels well otherwise     EXAM:   /90 (BP Location: Right arm, Patient Position: Sitting, Cuff Size: large)   Pulse 87   Temp 98.1 °F (36.7 °C) (Temporal)   Wt 266 lb 1.6 oz (120.7 kg)   SpO2 98%   BMI 38.18 kg/m²   145/85  GENERAL: well developed, well nourished,in no apparent distress  SKIN: no rashes  NECK: supple,no adenopathy  LUNGS: clear to auscultation  CARDIO: RRR without murmur  EXTREMITIES: no edema    ASSESSMENT AND PLAN:   1. Essential hypertension, benign  Not controlled    Add norvasc to candesartan and hctz     kwaku in 2 months    Donating blood should help as well as DC smoking    kwaku in July          2. Type 2 diabetes mellitus without complication, without long-term current use of insulin (HCC)  Labs ordered      metformin refilled        3. Elevated hematocrit  Donating blood    CBC ordered      - CBC With Differential With Platelet; Future    4. Smoker  Should help  h/h if he quits as well as the BP       The patient indicates understanding of these issues and agrees to the plan.  .

## 2024-05-19 DIAGNOSIS — Z00.00 WELLNESS EXAMINATION: ICD-10-CM

## 2024-05-20 RX ORDER — ZOLPIDEM TARTRATE 10 MG/1
10 TABLET ORAL NIGHTLY
Qty: 30 TABLET | Refills: 0 | Status: SHIPPED | OUTPATIENT
Start: 2024-05-20

## 2024-05-20 NOTE — TELEPHONE ENCOUNTER
Requesting   zolpidem 10 MG Oral Tab          Sig: Take 1 tablet (10 mg total) by mouth nightly.    Disp: 30 tablet    Refills: 1    Start: 5/19/2024    Class: Normal    Non-formulary For: Wellness examination    To pharmacy: Not to exceed 4 additional fills before 04/21/2022    Last ordered: 1 month ago (4/17/2024) by Mich Iglesias MD    Controlled Substance Medication [id number]/[id number]/2024 08:28 PM    This medication is a controlled substance - forward to provider to refill        LOV: 5/15/2024  RTC: 2 months   Last Relevant Labs: n/a   Filled: 4/17/2024 #30 with 1 refills    Future Appointments   Date Time Provider Department Center   7/15/2024  3:00 PM Mich Iglesias MD [facility] [place]

## 2024-06-21 NOTE — PROGRESS NOTES
HPI:   Jace Panda presents with complaints of low back pain with anterior thigh pain and numbness to the knee.    The pain is described as moderate numbness, aching that is intermittent.  The patient’s activity level has increased since last visit.  The pain is worst unrelated to time of day.    Changes in condition/history since last visit: here today for f/u, having had repeat LESI on 4/2/24 (previous on 9/6/22,  6/8/23).  States that he has had improvement in LBP, and LE pain, though continues with n/t in LEs.  Overall, reports 50% relief.  He has been active with HEP learned in PT, which has been ineffective.  Using NSAIDs, to temporary relief.  Wishes to repeat procedure.      Last procedure: L3/4 KHADIJAH    Date: 4/2/24 (previous on 9/6/22, 6/8/23)    Percentage of relief experienced from the procedure: 50%    Duration of the relief: sustained     The following activities will increase the patient’s pain: increased activity     The following activities decrease the patient’s pain: limiting activity     Functional Assessment: Patient reports that they are able to complete all of their ADL's such as eating, bathing, using the toilet, dressing and getting up from a bed or a chair independently.    Current Medications:  Current Outpatient Medications   Medication Sig Dispense Refill    zolpidem 10 MG Oral Tab Take 1 tablet (10 mg total) by mouth nightly. 30 tablet 1    metFORMIN  MG Oral Tablet 24 Hr Take 1 tablet (500 mg total) by mouth daily. 90 tablet 0    candesartan 32 MG Oral Tab Take 1 tablet (32 mg total) by mouth daily. (Patient taking differently: Take 1 tablet (32 mg total) by mouth daily. Patient taking 2 tablets) 90 tablet 0    pantoprazole 40 MG Oral Tab EC Take 1 tablet (40 mg total) by mouth before breakfast. 90 tablet 0    hydroCHLOROthiazide 25 MG Oral Tab Take 1 tablet (25 mg total) by mouth daily. 90 tablet 0    Testosterone cypionate (DEPOTESTOTERONE) 200 MG/ML Intramuscular Solution  Inject 0.5 mL (100 mg total) into the muscle See Admin Instructions. Every 10 days.        Patient requires assistance with: No assistance required    Reviewed Patient History Dated: 4/2/24 no changes noted    Physical Exam:   /80 (BP Location: Right arm, Patient Position: Sitting, Cuff Size: large)   Pulse 98   SpO2 97%   VAS Pain Score:  2/10  General Appearance: Well developed, well nourished, normal build, independent body habitus, no apparent physical disabilities, well groomed    Neurological Exam: WNL-Orientation to time, place and person, normal mood & effect, normal concentration & attention span  Inspection: non-antalgic, no acute distress  Radiology/Lab Test Reviewed: MRI:  L1-L2:  There is diffuse endplate osteophyte, facet hypertrophy and disc bulge.  There is mild to moderate central canal stenosis.  There is moderate subarticular and foraminal stenosis.   L2-L3:  There is diffuse endplate osteophyte, disc bulge and facet hypertrophy.  There is mild central canal stenosis.  There is moderate left subarticular and foraminal stenosis.  There is mild right subarticular and foraminal stenosis.   L3-L4:  There is diffuse endplate osteophyte, disc bulge and facet hypertrophy.  There is mild to moderate central canal stenosis.  There is moderate left subarticular and foraminal stenosis.  There is mild right subarticular and foraminal stenosis.   L4-L5:  There is diffuse disc bulge, endplate osteophyte and facet hypertrophy.  There is no central canal stenosis.  There is moderate right subarticular and foraminal stenosis.  There is mild left subarticular and foraminal stenosis.   L5-S1:  There is facet hypertrophy.  There is right parasagittal disc protrusion and endplate osteophyte.  There is no significant stenosis of central canal or foramina        Lab Results   Component Value Date    WBC 8.4 02/17/2024    WBC 9.7 05/07/2022    WBC 7.0 04/08/2016   No results found for: \"HEMOGLOBIN\"  Lab Results  was denied.   Homicidal ideation was denied.   Hygiene was fair .  Dress was neat.   Behavior was Within Normal Limits with No observation or self-report of difficulties ambulating.   Attitude was Cooperative.  Eye-contact was fair.  Speech: rate - WNL, rhythm - WNL, volume - WNL.  Verbalizations were goal directed.  Thought processes were intact and goal-oriented without evidence of delusions, hallucinations, obsessions, or abelardo; without no cognitive distortions.   Associations were characterized by intact cognitive processes.  Pt was oriented to person, place, time, and general circumstances;  recent:  fair.  Insight and judgment were estimated to be fair, AEB, a fair  understanding of cyclical maladaptive patterns, and the ability to use insight to inform behavior change.   Attention span and concentration appear within functional limits  Language use and knowledge base appear within functional limits        ASSESSMENT  Luis Enrique presented to the appointment today for evaluation and treatment of symptoms of anxiety and depression.  He is currently deemed no risk to himself or others and meets criteria for adjustment disorder with mixed anxiety and depressed mood. Luis Enrique was in agreement with recommendations.          6/18/2024    11:53 AM 3/19/2024    10:10 AM 12/27/2023     8:46 AM 11/15/2023     9:52 AM 2/1/2023    11:18 AM 2/25/2022     1:50 PM 3/30/2021     8:04 AM   PHQ Scores   PHQ2 Score 1 0 5 2 0 0 0   PHQ9 Score 1 2 10 10 0 0 0     Interpretation of Total Score Depression Severity: 1-4 = Minimal depression, 5-9 = Mild depression, 10-14 = Moderate depression, 15-19 = Moderately severe depression, 20-27 = Severe depression    How often pt has had thoughts of death or hurting self (if PHQ positive for depression):           12/27/2023     8:00 AM   NITHIN 7 SCORE   NITHIN-7 Total Score 15     Interpretation of NITHIN-7 score: 5-9 = mild anxiety, 10-14 = moderate anxiety, 15+ = severe anxiety. Recommend referral to    Component Value Date    .0 02/17/2024    .0 05/07/2022    .0 04/08/2016     Do you have any known blood/bleeding disorders?  No  Does patient currently take blood thinners?   None  Does patient currently take any antibiotics?   No  Patient educated and verbalized understanding.  Medical Decision Making:   Diagnosis:    Encounter Diagnosis   Name Primary?    Spinal stenosis of lumbar region with neurogenic claudication Yes     Impression: 50% relief with L3-4 epidural steroid injection on 4/2/24, and while LBP and LE pain are improved, still with  left lower extremity symptoms as well as numbness/tingling.  He has known L3-4 stenosis, and we will simply proceed with repeat L3-4 epidural steroid injection, risks and benefits reviewed in detail.    Plan: L3-4 interlaminar epidural steroid injection at his earliest convenience, pending insurance approval.  Risk and benefits reviewed in detail.  Will see him back in 2 to 3 weeks after procedure.    No orders of the defined types were placed in this encounter.      Meds & Refills for this Visit:  Requested Prescriptions      No prescriptions requested or ordered in this encounter       Imaging & Consults:  None    The patient indicates understanding of these issues and agrees to the plan.    TANIA Cazares

## 2024-06-23 DIAGNOSIS — Z00.00 WELLNESS EXAMINATION: ICD-10-CM

## 2024-06-24 RX ORDER — ZOLPIDEM TARTRATE 10 MG/1
10 TABLET ORAL NIGHTLY
Qty: 30 TABLET | Refills: 0 | Status: SHIPPED | OUTPATIENT
Start: 2024-06-24

## 2024-07-18 ENCOUNTER — LAB ENCOUNTER (OUTPATIENT)
Dept: LAB | Age: 49
End: 2024-07-18
Attending: FAMILY MEDICINE
Payer: COMMERCIAL

## 2024-07-18 DIAGNOSIS — E11.9 TYPE 2 DIABETES MELLITUS WITHOUT COMPLICATION, WITHOUT LONG-TERM CURRENT USE OF INSULIN (HCC): ICD-10-CM

## 2024-07-18 DIAGNOSIS — Z12.5 SPECIAL SCREENING, PROSTATE CANCER: ICD-10-CM

## 2024-07-18 DIAGNOSIS — R71.8 ELEVATED HEMATOCRIT: ICD-10-CM

## 2024-07-18 LAB
ALBUMIN SERPL-MCNC: 4.8 G/DL (ref 3.2–4.8)
ALBUMIN/GLOB SERPL: 2.4 {RATIO} (ref 1–2)
ALP LIVER SERPL-CCNC: 101 U/L
ALT SERPL-CCNC: 38 U/L
ANION GAP SERPL CALC-SCNC: 6 MMOL/L (ref 0–18)
AST SERPL-CCNC: 35 U/L (ref ?–34)
BASOPHILS # BLD AUTO: 0.13 X10(3) UL (ref 0–0.2)
BASOPHILS NFR BLD AUTO: 1.4 %
BILIRUB SERPL-MCNC: 0.5 MG/DL (ref 0.3–1.2)
BUN BLD-MCNC: 17 MG/DL (ref 9–23)
BUN/CREAT SERPL: 15.2 (ref 10–20)
CALCIUM BLD-MCNC: 9.3 MG/DL (ref 8.7–10.4)
CHLORIDE SERPL-SCNC: 105 MMOL/L (ref 98–112)
CO2 SERPL-SCNC: 29 MMOL/L (ref 21–32)
COMPLEXED PSA SERPL-MCNC: 0.43 NG/ML (ref ?–4)
CREAT BLD-MCNC: 1.12 MG/DL
DEPRECATED RDW RBC AUTO: 40 FL (ref 35.1–46.3)
EGFRCR SERPLBLD CKD-EPI 2021: 80 ML/MIN/1.73M2 (ref 60–?)
EOSINOPHIL # BLD AUTO: 0.46 X10(3) UL (ref 0–0.7)
EOSINOPHIL NFR BLD AUTO: 4.8 %
ERYTHROCYTE [DISTWIDTH] IN BLOOD BY AUTOMATED COUNT: 12.1 % (ref 11–15)
EST. AVERAGE GLUCOSE BLD GHB EST-MCNC: 137 MG/DL (ref 68–126)
FASTING STATUS PATIENT QL REPORTED: YES
GLOBULIN PLAS-MCNC: 2 G/DL (ref 2–3.5)
GLUCOSE BLD-MCNC: 119 MG/DL (ref 70–99)
HBA1C MFR BLD: 6.4 % (ref ?–5.7)
HCT VFR BLD AUTO: 55.1 %
HGB BLD-MCNC: 19.6 G/DL
IMM GRANULOCYTES # BLD AUTO: 0.07 X10(3) UL (ref 0–1)
IMM GRANULOCYTES NFR BLD: 0.7 %
LYMPHOCYTES # BLD AUTO: 1.23 X10(3) UL (ref 1–4)
LYMPHOCYTES NFR BLD AUTO: 12.9 %
MCH RBC QN AUTO: 31.9 PG (ref 26–34)
MCHC RBC AUTO-ENTMCNC: 35.6 G/DL (ref 31–37)
MCV RBC AUTO: 89.6 FL
MONOCYTES # BLD AUTO: 0.72 X10(3) UL (ref 0.1–1)
MONOCYTES NFR BLD AUTO: 7.6 %
NEUTROPHILS # BLD AUTO: 6.89 X10 (3) UL (ref 1.5–7.7)
NEUTROPHILS # BLD AUTO: 6.89 X10(3) UL (ref 1.5–7.7)
NEUTROPHILS NFR BLD AUTO: 72.6 %
OSMOLALITY SERPL CALC.SUM OF ELEC: 293 MOSM/KG (ref 275–295)
PLATELET # BLD AUTO: 225 10(3)UL (ref 150–450)
POTASSIUM SERPL-SCNC: 3.6 MMOL/L (ref 3.5–5.1)
PROT SERPL-MCNC: 6.8 G/DL (ref 5.7–8.2)
RBC # BLD AUTO: 6.15 X10(6)UL
SODIUM SERPL-SCNC: 140 MMOL/L (ref 136–145)
WBC # BLD AUTO: 9.5 X10(3) UL (ref 4–11)

## 2024-07-18 PROCEDURE — 83036 HEMOGLOBIN GLYCOSYLATED A1C: CPT

## 2024-07-18 PROCEDURE — 80053 COMPREHEN METABOLIC PANEL: CPT

## 2024-07-18 PROCEDURE — 85025 COMPLETE CBC W/AUTO DIFF WBC: CPT

## 2024-07-24 ENCOUNTER — OFFICE VISIT (OUTPATIENT)
Dept: INTERNAL MEDICINE CLINIC | Facility: CLINIC | Age: 49
End: 2024-07-24
Payer: COMMERCIAL

## 2024-07-24 VITALS
HEIGHT: 70 IN | DIASTOLIC BLOOD PRESSURE: 86 MMHG | SYSTOLIC BLOOD PRESSURE: 136 MMHG | WEIGHT: 281.81 LBS | RESPIRATION RATE: 18 BRPM | BODY MASS INDEX: 40.35 KG/M2 | HEART RATE: 92 BPM | TEMPERATURE: 98 F | OXYGEN SATURATION: 95 %

## 2024-07-24 DIAGNOSIS — I10 ESSENTIAL HYPERTENSION, BENIGN: Primary | ICD-10-CM

## 2024-07-24 DIAGNOSIS — E11.9 TYPE 2 DIABETES MELLITUS WITHOUT COMPLICATION, WITHOUT LONG-TERM CURRENT USE OF INSULIN (HCC): ICD-10-CM

## 2024-07-24 DIAGNOSIS — E66.9 OBESITY (BMI 30-39.9): ICD-10-CM

## 2024-07-24 DIAGNOSIS — F17.200 SMOKER: ICD-10-CM

## 2024-07-24 PROCEDURE — 3008F BODY MASS INDEX DOCD: CPT | Performed by: FAMILY MEDICINE

## 2024-07-24 PROCEDURE — 3044F HG A1C LEVEL LT 7.0%: CPT | Performed by: FAMILY MEDICINE

## 2024-07-24 PROCEDURE — 3079F DIAST BP 80-89 MM HG: CPT | Performed by: FAMILY MEDICINE

## 2024-07-24 PROCEDURE — 3075F SYST BP GE 130 - 139MM HG: CPT | Performed by: FAMILY MEDICINE

## 2024-07-24 PROCEDURE — 99214 OFFICE O/P EST MOD 30 MIN: CPT | Performed by: FAMILY MEDICINE

## 2024-07-24 RX ORDER — TIRZEPATIDE 2.5 MG/.5ML
2.5 INJECTION, SOLUTION SUBCUTANEOUS WEEKLY
Qty: 2 ML | Refills: 0 | Status: SHIPPED | OUTPATIENT
Start: 2024-07-24

## 2024-07-24 RX ORDER — AMLODIPINE BESYLATE 5 MG/1
5 TABLET ORAL DAILY
Qty: 90 TABLET | Refills: 0 | Status: SHIPPED | OUTPATIENT
Start: 2024-07-24

## 2024-07-24 RX ORDER — ZOLPIDEM TARTRATE 10 MG/1
10 TABLET ORAL NIGHTLY
Qty: 30 TABLET | Refills: 0 | Status: SHIPPED | OUTPATIENT
Start: 2024-07-24

## 2024-07-24 RX ORDER — CANDESARTAN 32 MG/1
32 TABLET ORAL DAILY
Qty: 90 TABLET | Refills: 0 | Status: SHIPPED | OUTPATIENT
Start: 2024-07-24

## 2024-07-24 RX ORDER — METFORMIN HYDROCHLORIDE 500 MG/1
500 TABLET, EXTENDED RELEASE ORAL DAILY
Qty: 90 TABLET | Refills: 0 | Status: SHIPPED | OUTPATIENT
Start: 2024-07-24

## 2024-07-24 RX ORDER — HYDROCHLOROTHIAZIDE 25 MG/1
25 TABLET ORAL DAILY
Qty: 90 TABLET | Refills: 0 | Status: SHIPPED | OUTPATIENT
Start: 2024-07-24

## 2024-07-24 NOTE — PROGRESS NOTES
Jace Panda is a 48 year old male.  HPI:   Here for BP check      on meds as directed    was up yesterday so unable to donate blood     few  weeks ago took wifes estrogen pils for 5d by accident     no ill affects from it      Still smokes     Admits to eating out a lot    Asking about help to loose weight      Breathing good    no CP          Current Outpatient Medications   Medication Sig Dispense Refill    Tirzepatide (MOUNJARO) 2.5 MG/0.5ML Subcutaneous Solution Pen-injector Inject 2.5 mg into the skin once a week. 2 mL 0    candesartan 32 MG Oral Tab Take 1 tablet (32 mg total) by mouth daily. 90 tablet 0    hydroCHLOROthiazide 25 MG Oral Tab Take 1 tablet (25 mg total) by mouth daily. 90 tablet 0    amLODIPine 5 MG Oral Tab Take 1 tablet (5 mg total) by mouth daily. 90 tablet 0    metFORMIN  MG Oral Tablet 24 Hr Take 1 tablet (500 mg total) by mouth daily. 90 tablet 0    zolpidem 10 MG Oral Tab Take 1 tablet (10 mg total) by mouth nightly. 30 tablet 0    pantoprazole 40 MG Oral Tab EC Take 1 tablet (40 mg total) by mouth before breakfast. 90 tablet 0    Testosterone cypionate (DEPOTESTOTERONE) 200 MG/ML Intramuscular Solution Inject 0.5 mL (100 mg total) into the muscle See Admin Instructions. Every 10 days.        Past Medical History:    Carpal tunnel syndrome on both sides    hands, R hand repaired through surgery, L hand unrepaired    Cellulitis    arm    Elevated blood sugar    Essential hypertension, benign    Low testosterone    Unspecified essential hypertension      Social History:  Social History     Socioeconomic History    Marital status:    Tobacco Use    Smoking status: Every Day     Current packs/day: 1.00     Average packs/day: 1 pack/day for 1 year (1.0 ttl pk-yrs)     Types: Cigarettes    Smokeless tobacco: Never    Tobacco comments:     started again 2020, previously Quit 12/2009 1 1/2 ppd since 1991. Unspecified tabacco product   Vaping Use    Vaping status: Never Used    Substance and Sexual Activity    Alcohol use: Not Currently     Comment: social    Drug use: Yes     Frequency: 1.0 times per week     Types: Cannabis   Other Topics Concern    Caffeine Concern Yes     Comment: 3-4 cans of big red bulls daily and 8 cans of pop daily.     Exercise Yes     Comment: 6x/week.         REVIEW OF SYSTEMS:   GENERAL HEALTH: feels well otherwise  SKIN: denies rashes  RESPIRATORY: denies shortness of breath  CARDIOVASCULAR: denies chest pain   GI: denies abdominal pain  NEURO: denies headaches    EXAM:   /86   Pulse 92   Temp 97.9 °F (36.6 °C) (Temporal)   Resp 18   Ht 5' 10\" (1.778 m)   Wt 281 lb 12.8 oz (127.8 kg)   SpO2 95%   BMI 40.43 kg/m²   GENERAL: well developed, well nourished,in no apparent distress  SKIN: no rashes  NECK: supple,no adenopathy  LUNGS: clear to auscultation  CARDIO: RRR without murmur  EXTREMITIES: no edema    ASSESSMENT AND PLAN:   1. Essential hypertension, benign  Better but not ideal      hopefully the weight loss can help      - candesartan 32 MG Oral Tab; Take 1 tablet (32 mg total) by mouth daily.  Dispense: 90 tablet; Refill: 0  - hydroCHLOROthiazide 25 MG Oral Tab; Take 1 tablet (25 mg total) by mouth daily.  Dispense: 90 tablet; Refill: 0    2. Type 2 diabetes mellitus without complication, without long-term current use of insulin (HCC)  A1c better   add Mounjaro 2.5 weekly    kwaku 1 mo     should help weight as well      - Tirzepatide (MOUNJARO) 2.5 MG/0.5ML Subcutaneous Solution Pen-injector; Inject 2.5 mg into the skin once a week.  Dispense: 2 mL; Refill: 0    3. Smoker  Discussed smoking        4. Obesity (BMI 30-39.9)  Will try Monjaro to see if helps        - zolpidem 10 MG Oral Tab; Take 1 tablet (10 mg total) by mouth nightly.  Dispense: 30 tablet; Refill: 0     The patient indicates understanding of these issues and agrees to the plan.  .

## 2024-07-26 DIAGNOSIS — I10 ESSENTIAL HYPERTENSION, BENIGN: ICD-10-CM

## 2024-07-26 RX ORDER — CANDESARTAN 32 MG/1
32 TABLET ORAL DAILY
Qty: 90 TABLET | Refills: 0 | OUTPATIENT
Start: 2024-07-26

## 2024-07-26 RX ORDER — HYDROCHLOROTHIAZIDE 25 MG/1
25 TABLET ORAL DAILY
Qty: 90 TABLET | Refills: 0 | OUTPATIENT
Start: 2024-07-26

## 2024-07-26 NOTE — TELEPHONE ENCOUNTER
Candesartan 32 mg-too early for   Filled 7-24-24  Qty 90  0 refills    Hydrochlorothiazide 25 mg- Too early for   Filled 7-24-24  Qty 90  0 refills

## 2024-08-17 DIAGNOSIS — I10 ESSENTIAL HYPERTENSION, BENIGN: ICD-10-CM

## 2024-08-17 DIAGNOSIS — E11.9 TYPE 2 DIABETES MELLITUS WITHOUT COMPLICATION, WITHOUT LONG-TERM CURRENT USE OF INSULIN (HCC): ICD-10-CM

## 2024-08-19 ENCOUNTER — TELEPHONE (OUTPATIENT)
Dept: INTERNAL MEDICINE CLINIC | Facility: CLINIC | Age: 49
End: 2024-08-19

## 2024-08-19 RX ORDER — AMLODIPINE BESYLATE 5 MG/1
5 TABLET ORAL DAILY
Qty: 90 TABLET | Refills: 0 | Status: SHIPPED | OUTPATIENT
Start: 2024-08-19

## 2024-08-19 RX ORDER — HYDROCHLOROTHIAZIDE 25 MG/1
25 TABLET ORAL DAILY
Qty: 90 TABLET | Refills: 0 | Status: SHIPPED | OUTPATIENT
Start: 2024-08-19

## 2024-08-19 RX ORDER — TIRZEPATIDE 2.5 MG/.5ML
2.5 INJECTION, SOLUTION SUBCUTANEOUS WEEKLY
Qty: 2 ML | Refills: 0 | Status: SHIPPED | OUTPATIENT
Start: 2024-08-19

## 2024-08-19 RX ORDER — METFORMIN HYDROCHLORIDE 500 MG/1
500 TABLET, EXTENDED RELEASE ORAL DAILY
Qty: 90 TABLET | Refills: 0 | Status: SHIPPED | OUTPATIENT
Start: 2024-08-19

## 2024-08-19 NOTE — TELEPHONE ENCOUNTER
Amlodipine 5 mg  Filled 7-24-24  Qty 90  0 refills  Future Appointments   Date Time Provider Department Center   8/26/2024  3:00 PM Mich Iglesias MD [facility]   LOV 7-24-24 TO    Hydrochlorothiazide 25 mg  Filled 7-24-24  Qty 90  0 refills  Future Appointments   Date Time Provider Department Center   8/26/2024  3:00 PM Mich Iglesias MD [facility]   LOV 7-24-24 TO    Metformin  mg  Filled 7-24-24  Qty 90  0 refills  Future Appointments   Date Time Provider Department Center   8/26/2024  3:00 PM Mich Iglesias MD [facility]   LOV 7-24-24 TO    Mounjaro 2.5 mg  Filled 7-24-24  Qty 2 mL  0 refills  Future Appointments   Date Time Provider Department Center   8/26/2024  3:00 PM Mich Iglesias MD [facility]   LOV 7-24-24 TO

## 2024-08-20 ENCOUNTER — TELEPHONE (OUTPATIENT)
Dept: INTERNAL MEDICINE CLINIC | Facility: CLINIC | Age: 49
End: 2024-08-20

## 2024-08-20 DIAGNOSIS — E11.9 TYPE 2 DIABETES MELLITUS WITHOUT COMPLICATION, WITHOUT LONG-TERM CURRENT USE OF INSULIN (HCC): ICD-10-CM

## 2024-08-20 NOTE — TELEPHONE ENCOUNTER
Received fax stating Mounjaro is on backorder.    Message sent to pt to call around to local pharmacies to see if they have it in stock

## 2024-08-22 RX ORDER — TIRZEPATIDE 2.5 MG/.5ML
2.5 INJECTION, SOLUTION SUBCUTANEOUS WEEKLY
Qty: 2 ML | Refills: 0 | Status: SHIPPED | OUTPATIENT
Start: 2024-08-22

## 2024-08-26 ENCOUNTER — OFFICE VISIT (OUTPATIENT)
Dept: INTERNAL MEDICINE CLINIC | Facility: CLINIC | Age: 49
End: 2024-08-26
Payer: COMMERCIAL

## 2024-08-26 VITALS
SYSTOLIC BLOOD PRESSURE: 125 MMHG | BODY MASS INDEX: 37.08 KG/M2 | TEMPERATURE: 98 F | HEIGHT: 70 IN | DIASTOLIC BLOOD PRESSURE: 75 MMHG | OXYGEN SATURATION: 96 % | HEART RATE: 92 BPM | WEIGHT: 259 LBS

## 2024-08-26 DIAGNOSIS — E66.9 OBESITY (BMI 30-39.9): ICD-10-CM

## 2024-08-26 DIAGNOSIS — E11.9 TYPE 2 DIABETES MELLITUS WITHOUT COMPLICATION, WITHOUT LONG-TERM CURRENT USE OF INSULIN (HCC): Primary | ICD-10-CM

## 2024-08-26 DIAGNOSIS — I10 ESSENTIAL HYPERTENSION, BENIGN: ICD-10-CM

## 2024-08-26 PROCEDURE — 3078F DIAST BP <80 MM HG: CPT | Performed by: FAMILY MEDICINE

## 2024-08-26 PROCEDURE — 3008F BODY MASS INDEX DOCD: CPT | Performed by: FAMILY MEDICINE

## 2024-08-26 PROCEDURE — 99213 OFFICE O/P EST LOW 20 MIN: CPT | Performed by: FAMILY MEDICINE

## 2024-08-26 PROCEDURE — 3074F SYST BP LT 130 MM HG: CPT | Performed by: FAMILY MEDICINE

## 2024-08-26 NOTE — PROGRESS NOTES
Jace Panda is a 48 year old male.  HPI:   Pt is here for medication check and follow up   Overall is doing well.  Is taking meds as directed.    No issues w mounjaro     feels it has helped .  Not as hungry     no nausea w it     would like to stay on it       Is tracking BP and has been good    120/70 range            Current Outpatient Medications   Medication Sig Dispense Refill    Tirzepatide (MOUNJARO) 2.5 MG/0.5ML Subcutaneous Solution Pen-injector Inject 2.5 mg into the skin once a week. 2 mL 0    hydroCHLOROthiazide 25 MG Oral Tab Take 1 tablet (25 mg total) by mouth daily. 90 tablet 0    amLODIPine 5 MG Oral Tab Take 1 tablet (5 mg total) by mouth daily. 90 tablet 0    metFORMIN  MG Oral Tablet 24 Hr Take 1 tablet (500 mg total) by mouth daily. 90 tablet 0    candesartan 32 MG Oral Tab Take 1 tablet (32 mg total) by mouth daily. 90 tablet 0    zolpidem 10 MG Oral Tab Take 1 tablet (10 mg total) by mouth nightly. 30 tablet 0    pantoprazole 40 MG Oral Tab EC Take 1 tablet (40 mg total) by mouth before breakfast. 90 tablet 0    Testosterone cypionate (DEPOTESTOTERONE) 200 MG/ML Intramuscular Solution Inject 0.5 mL (100 mg total) into the muscle See Admin Instructions. Every 10 days.        Past Medical History:    Carpal tunnel syndrome on both sides    hands, R hand repaired through surgery, L hand unrepaired    Cellulitis    arm    Elevated blood sugar    Essential hypertension, benign    Low testosterone    Unspecified essential hypertension      Social History:  Social History     Socioeconomic History    Marital status:    Tobacco Use    Smoking status: Every Day     Current packs/day: 1.00     Average packs/day: 1 pack/day for 1 year (1.0 ttl pk-yrs)     Types: Cigarettes    Smokeless tobacco: Never    Tobacco comments:     started again 2020, previously Quit 12/2009 1 1/2 ppd since 1991. Unspecified tabacco product   Vaping Use    Vaping status: Never Used   Substance and Sexual  Activity    Alcohol use: Not Currently     Comment: social    Drug use: Yes     Frequency: 1.0 times per week     Types: Cannabis   Other Topics Concern    Caffeine Concern Yes     Comment: 3-4 cans of big red bulls daily and 8 cans of pop daily.     Exercise Yes     Comment: 6x/week.         REVIEW OF SYSTEMS:   GENERAL HEALTH: feels well otherwise  SKIN: denies rashes  RESPIRATORY: denies shortness of breath  CARDIOVASCULAR: denies chest pain        EXAM:   /75   Pulse 92   Temp 98 °F (36.7 °C) (Temporal)   Ht 5' 10\" (1.778 m)   Wt 259 lb (117.5 kg)   SpO2 96%   BMI 37.16 kg/m²   GENERAL: well developed, well nourished,in no apparent distress  SKIN: no rashes  NECK: supple,no adenopathy  LUNGS: clear to auscultation  CARDIO: RRR without murmur  EXTREMITIES: no edema    ASSESSMENT AND PLAN:   1. Type 2 diabetes mellitus without complication, without long-term current use of insulin (HCC)  Doing great w the weight loss    CPM w mounjaro    leave it at the same dose    kwaku 4 weeks        2. Obesity (BMI 30-39.9)  Down 20 lbs    CPM mounjaro 2.5        3. Essential hypertension, benign  BP good         The patient indicates understanding of these issues and agrees to the plan.  .

## 2024-08-28 DIAGNOSIS — G47.00 INSOMNIA, UNSPECIFIED TYPE: Primary | ICD-10-CM

## 2024-08-29 RX ORDER — ZOLPIDEM TARTRATE 10 MG/1
10 TABLET ORAL NIGHTLY
Qty: 30 TABLET | Refills: 0 | Status: SHIPPED | OUTPATIENT
Start: 2024-08-29

## 2024-08-29 NOTE — TELEPHONE ENCOUNTER
Requesting    zolpidem 10 MG Oral Tab         Sig: Take 1 tablet (10 mg total) by mouth nightly.    Disp: 30 tablet    Refills: 0    Start: 8/28/2024    Class: Normal    To pharmacy: Not to exceed 4 additional fills before 04/21/2022    Last ordered: 1 month ago (7/24/2024) by Mich Iglesias MD    Controlled Substance Medication [id number]/28/2024 07:24 PM    This medication is a controlled substance - forward to provider to refill        LOV: 8/26/2024  RTC: 4 weeks   Last Relevant Labs: n/a   Filled: 7/24/2024 #30 with 0 refills    Future Appointments   Date Time Provider Department Center   10/2/2024  3:30 PM Mich Iglesias MD [facility]

## 2024-09-05 RX ORDER — NICOTINE 21 MG/24HR
1 PATCH, TRANSDERMAL 24 HOURS TRANSDERMAL EVERY 24 HOURS
Qty: 28 PATCH | Refills: 0 | Status: SHIPPED | OUTPATIENT
Start: 2024-09-05

## 2024-09-14 DIAGNOSIS — E11.9 TYPE 2 DIABETES MELLITUS WITHOUT COMPLICATION, WITHOUT LONG-TERM CURRENT USE OF INSULIN (HCC): ICD-10-CM

## 2024-09-16 ENCOUNTER — TELEPHONE (OUTPATIENT)
Dept: INTERNAL MEDICINE CLINIC | Facility: CLINIC | Age: 49
End: 2024-09-16

## 2024-09-16 RX ORDER — TIRZEPATIDE 2.5 MG/.5ML
2.5 INJECTION, SOLUTION SUBCUTANEOUS WEEKLY
Qty: 2 ML | Refills: 0 | Status: SHIPPED | OUTPATIENT
Start: 2024-09-16

## 2024-09-16 NOTE — TELEPHONE ENCOUNTER
Requesting    Tirzepatide (MOUNJARO) 2.5 MG/0.5ML Subcutaneous Solution Pen-injector         Sig: Inject 2.5 mg into the skin once a week.    Disp: 2 mL    Refills: 0    Start: 9/14/2024    Class: Normal    Non-formulary For: Type 2 diabetes mellitus without complication, without long-term current use of insulin (HCC)    Last ordered: 3 weeks ago (8/22/2024) by Mich Iglesias MD    Diabetes Medication Protocol [id number]/14/2024 10:39 PM   Protocol Details Last A1C < 7.5 and within past 6 months    In person appointment or virtual visit in the past 6 mos or appointment in next 3 mos    Microalbumin procedure in past 12 months or taking ACE/ARB    EGFRCR or GFRNAA > 50    GFR in the past 12 months        LOV: 8/26/2024  RTC: 1 month  Last Relevant Labs: 7/18/2024  Filled: 8/22/2024 #2 mL with 0 refills    Future Appointments   Date Time Provider Department Center   10/2/2024  3:30 PM Mich Iglesias MD [facility]

## 2024-09-16 NOTE — TELEPHONE ENCOUNTER
Mich Iglesias MD Physician 8:30 AM     Cosign     Attest     Copy     Please remind pt he is due for CT chest for the nodules     Ordered         Pt active on mc, mcm sent for reminder

## 2024-09-23 ENCOUNTER — TELEPHONE (OUTPATIENT)
Dept: INTERNAL MEDICINE CLINIC | Facility: CLINIC | Age: 49
End: 2024-09-23

## 2024-09-23 NOTE — TELEPHONE ENCOUNTER
Incoming fax from [facility]    Placed in TO in basket for review

## 2024-09-27 ENCOUNTER — HOSPITAL ENCOUNTER (OUTPATIENT)
Dept: CT IMAGING | Age: 49
Discharge: HOME | End: 2024-09-27
Attending: FAMILY MEDICINE
Payer: COMMERCIAL

## 2024-09-27 ENCOUNTER — LAB ENCOUNTER (OUTPATIENT)
Dept: LAB | Age: 49
End: 2024-09-27
Attending: FAMILY MEDICINE
Payer: COMMERCIAL

## 2024-09-27 DIAGNOSIS — R91.8 PULMONARY NODULES: ICD-10-CM

## 2024-09-27 PROCEDURE — 71250 CT THORAX DX C-: CPT | Performed by: FAMILY MEDICINE

## 2024-10-02 ENCOUNTER — OFFICE VISIT (OUTPATIENT)
Dept: INTERNAL MEDICINE CLINIC | Facility: CLINIC | Age: 49
End: 2024-10-02
Payer: COMMERCIAL

## 2024-10-02 VITALS
WEIGHT: 248.81 LBS | HEART RATE: 73 BPM | SYSTOLIC BLOOD PRESSURE: 118 MMHG | DIASTOLIC BLOOD PRESSURE: 80 MMHG | TEMPERATURE: 98 F | OXYGEN SATURATION: 98 % | HEIGHT: 70 IN | BODY MASS INDEX: 35.62 KG/M2

## 2024-10-02 DIAGNOSIS — E66.9 OBESITY (BMI 30-39.9): ICD-10-CM

## 2024-10-02 DIAGNOSIS — E11.9 TYPE 2 DIABETES MELLITUS WITHOUT COMPLICATION, WITHOUT LONG-TERM CURRENT USE OF INSULIN (HCC): ICD-10-CM

## 2024-10-02 DIAGNOSIS — F17.200 SMOKER: ICD-10-CM

## 2024-10-02 DIAGNOSIS — I10 ESSENTIAL HYPERTENSION, BENIGN: Primary | ICD-10-CM

## 2024-10-02 PROCEDURE — 3008F BODY MASS INDEX DOCD: CPT | Performed by: FAMILY MEDICINE

## 2024-10-02 PROCEDURE — 3074F SYST BP LT 130 MM HG: CPT | Performed by: FAMILY MEDICINE

## 2024-10-02 PROCEDURE — 99213 OFFICE O/P EST LOW 20 MIN: CPT | Performed by: FAMILY MEDICINE

## 2024-10-02 PROCEDURE — 3079F DIAST BP 80-89 MM HG: CPT | Performed by: FAMILY MEDICINE

## 2024-10-02 RX ORDER — TIRZEPATIDE 2.5 MG/.5ML
2.5 INJECTION, SOLUTION SUBCUTANEOUS WEEKLY
Qty: 6 ML | Refills: 0 | Status: SHIPPED | OUTPATIENT
Start: 2024-10-02

## 2024-10-02 NOTE — PROGRESS NOTES
Jace Panda is a 48 year old male.  HPI:   Here for f/u on meds    quite pleased w weight loss     On mounjaro 2.5 still     no issues w it       is making better choices    still exercising    Breathing is good    no CP    has been cutting down on smoking as well    fighting the desire     not on the patch     not on hctz      no lightheadedness        Current Outpatient Medications   Medication Sig Dispense Refill    Tirzepatide (MOUNJARO) 2.5 MG/0.5ML Subcutaneous Solution Pen-injector Inject 2.5 mg into the skin once a week. 6 mL 0    nicotine (NICODERM CQ) 21 MG/24HR Transdermal Patch 24 Hr Place 1 patch onto the skin daily. 28 patch 0    zolpidem 10 MG Oral Tab Take 1 tablet (10 mg total) by mouth nightly. 30 tablet 0    amLODIPine 5 MG Oral Tab Take 1 tablet (5 mg total) by mouth daily. 90 tablet 0    candesartan 32 MG Oral Tab Take 1 tablet (32 mg total) by mouth daily. 90 tablet 0    pantoprazole 40 MG Oral Tab EC Take 1 tablet (40 mg total) by mouth before breakfast. 90 tablet 0    Testosterone cypionate (DEPOTESTOTERONE) 200 MG/ML Intramuscular Solution Inject 0.5 mL (100 mg total) into the muscle See Admin Instructions. Every 10 days.        Past Medical History:    Carpal tunnel syndrome on both sides    hands, R hand repaired through surgery, L hand unrepaired    Cellulitis    arm    Elevated blood sugar    Essential hypertension, benign    Low testosterone    Unspecified essential hypertension      Social History:  Social History     Socioeconomic History    Marital status:    Tobacco Use    Smoking status: Every Day     Current packs/day: 1.00     Average packs/day: 1 pack/day for 1 year (1.0 ttl pk-yrs)     Types: Cigarettes    Smokeless tobacco: Never    Tobacco comments:     started again 2020, previously Quit 12/2009 1 1/2 ppd since 1991. Unspecified tabacco product   Vaping Use    Vaping status: Never Used   Substance and Sexual Activity    Alcohol use: Not Currently     Comment:  social    Drug use: Yes     Frequency: 1.0 times per week     Types: Cannabis   Other Topics Concern    Caffeine Concern Yes     Comment: 3-4 cans of big red bulls daily and 8 cans of pop daily.     Exercise Yes     Comment: 6x/week.         REVIEW OF SYSTEMS:   GENERAL HEALTH: feels well otherwise       EXAM:   /80   Pulse 73   Temp 97.8 °F (36.6 °C) (Temporal)   Ht 5' 10\" (1.778 m)   Wt 248 lb 12.8 oz (112.9 kg)   SpO2 98%   BMI 35.70 kg/m²   GENERAL: well developed, well nourished,in no apparent distress  NECK: supple,no adenopathy  LUNGS: clear to auscultation  CARDIO: RRR without murmur  EXTREMITIES: no edema    ASSESSMENT AND PLAN:   1. Essential hypertension, benign  BP is great    CPM w current meds        2. Obesity (BMI 30-39.9)  Doing great!!!   CPM Mounjaro   kwaku 2 mo        3. Smoker  Glad he is working on it         4. Type 2 diabetes mellitus without complication, without long-term current use of insulin ([facility])  A1c 5.7!!!!      DC metformin       - Tirzepatide (MOUNJARO) 2.5 MG/0.5ML Subcutaneous Solution Pen-injector; Inject 2.5 mg into the skin once a week.  Dispense: 6 mL; Refill: 0     The patient indicates understanding of these issues and agrees to the plan.  .

## 2024-10-03 DIAGNOSIS — G47.00 INSOMNIA, UNSPECIFIED TYPE: ICD-10-CM

## 2024-10-04 RX ORDER — ZOLPIDEM TARTRATE 10 MG/1
10 TABLET ORAL NIGHTLY
Qty: 30 TABLET | Refills: 0 | Status: SHIPPED | OUTPATIENT
Start: 2024-10-04

## 2024-10-04 NOTE — TELEPHONE ENCOUNTER
zolpidem 10 MG Oral Tab          Sig: Take 1 tablet (10 mg total) by mouth nightly.    Disp: 30 tablet    Refills: 0    Start: 10/3/2024    Class: Normal    For: Insomnia, unspecified type    To pharmacy: Not to exceed 4 additional fills before 04/21/2022    Last ordered: 1 month ago (8/29/2024) by Mich Iglesias MD    Controlled Substance Medication Qbrogb82/03/2024 08:17 PM    This medication is a controlled substance - forward to provider to refill      LOV 10/2/24  Filled 8/29/24 30 tabs 0 refills   Future Appointments   Date Time Provider Department Center   12/2/2024  3:30 PM Mich Iglesias MD [facility] [place]

## 2024-10-17 DIAGNOSIS — E11.9 TYPE 2 DIABETES MELLITUS WITHOUT COMPLICATION, WITHOUT LONG-TERM CURRENT USE OF INSULIN (HCC): ICD-10-CM

## 2024-10-18 NOTE — TELEPHONE ENCOUNTER
Mounjaro 2.5 mg  Filled 10-2-24  Qty 6 mL  0 refills  Future Appointments   Date Time Provider Department Center   12/2/2024  3:30 PM Mich Iglesias MD [facility] [place]   LOV 10-2-24 TO  Labs 7-18-24 A1c

## 2024-10-20 DIAGNOSIS — I10 ESSENTIAL HYPERTENSION, BENIGN: ICD-10-CM

## 2024-10-21 RX ORDER — CANDESARTAN 32 MG/1
32 TABLET ORAL DAILY
Qty: 90 TABLET | Refills: 0 | Status: SHIPPED | OUTPATIENT
Start: 2024-10-21

## 2024-10-21 NOTE — TELEPHONE ENCOUNTER
Requesting    Name from pharmacy: CANDESARTAN CILEXETIL 32 MG TB         Will file in chart as: CANDESARTAN 32 MG Oral Tab    Sig: TAKE 1 TABLET BY MOUTH DAILY.    Disp: 90 tablet    Refills: 0 (Pharmacy requested: Not specified)    Start: 10/20/2024    Class: Normal    Non-formulary For: Essential hypertension, benign    Last ordered: 2 months ago (7/24/2024) by Mich Iglesias MD    Last refill: 7/24/2024    Rx #: [id number]    Hypertension Medications Protocol Drkumu44/20/2024 10:03 AM   Protocol Details CMP or BMP in past 12 months    Last BP reading less than 140/90    In person appointment or virtual visit in the past 12 mos or appointment in next 3 mos    EGFRCR or GFRNAA > 50        LOV: 10/2/2024  RTC: 2 months  Last Relevant Labs: 7/18/2024  Filled: 7/24/2024 #90 with 0 refills    Future Appointments   Date Time Provider Department Center   12/2/2024  3:30 PM Mich Iglesias MD [facility] [place]

## 2024-11-08 DIAGNOSIS — G47.00 INSOMNIA, UNSPECIFIED TYPE: ICD-10-CM

## 2024-11-11 RX ORDER — ZOLPIDEM TARTRATE 10 MG/1
10 TABLET ORAL NIGHTLY
Qty: 30 TABLET | Refills: 0 | Status: SHIPPED | OUTPATIENT
Start: 2024-11-11

## 2024-11-11 NOTE — TELEPHONE ENCOUNTER
Requesting    zolpidem 10 MG Oral Tab         Sig: Take 1 tablet (10 mg total) by mouth nightly.    Disp: 30 tablet    Refills: 0    Start: 11/8/2024    Class: Normal    Non-formulary For: Insomnia, unspecified type    To pharmacy: Not to exceed 4 additional fills before 04/21/2022    Last ordered: 1 month ago (10/4/2024) by Mich Iglesias MD    Controlled Substance Medication [id number]/08/2024 09:55 PM    This medication is a controlled substance - forward to provider to refill        LOV: 10/2/2024  RTC: 2 months   Last Relevant Labs: n/a   Filled: 10/4/2024 #30 with 0 refills    Future Appointments   Date Time Provider Department Center   11/19/2024  3:30 PM Kevin Stark PA [facility] [place]   12/2/2024  3:30 PM Mich Iglesias MD [facility] [place]

## 2024-11-13 RX ORDER — PANTOPRAZOLE SODIUM 40 MG/1
40 TABLET, DELAYED RELEASE ORAL
Qty: 90 TABLET | Refills: 0 | Status: SHIPPED | OUTPATIENT
Start: 2024-11-13

## 2024-11-13 NOTE — TELEPHONE ENCOUNTER
Requesting    Name from pharmacy: PANTOPRAZOLE SOD DR 40 MG TAB         Will file in chart as: PANTOPRAZOLE 40 MG Oral Tab EC    Sig: TAKE 1 TABLET BY MOUTH BEFORE BREAKFAST    Disp: 90 tablet    Refills: 0 (Pharmacy requested: Not specified)    Start: 11/13/2024    Class: Normal    Non-formulary    Last ordered: 6 months ago (5/12/2024) by Mich Iglesias MD    Last refill: 8/15/2024    Rx #: [id number]    Gastrointestional Medication Protocol [id number]/13/2024 12:08 AM   Protocol Details In person appointment or virtual visit in the past 12 mos or appointment in next 3 mos        LOV: 10/2/2024  RTC: 2 months   Last Relevant Labs: n/a   Filled: 8/15/2024 #90 with 0 refills    Future Appointments   Date Time Provider Department Center   11/19/2024  3:30 PM Kevin Stark PA [facility] [place]   12/2/2024  3:30 PM Mich Iglesias MD [facility] [place]

## 2024-11-26 ENCOUNTER — OFFICE VISIT (OUTPATIENT)
Dept: PAIN CLINIC | Facility: CLINIC | Age: 49
End: 2024-11-26
Payer: COMMERCIAL

## 2024-11-26 VITALS
OXYGEN SATURATION: 98 % | HEART RATE: 86 BPM | BODY MASS INDEX: 36 KG/M2 | SYSTOLIC BLOOD PRESSURE: 138 MMHG | DIASTOLIC BLOOD PRESSURE: 88 MMHG | WEIGHT: 248 LBS

## 2024-11-26 DIAGNOSIS — M51.26 PROTRUSION OF LUMBAR INTERVERTEBRAL DISC: Primary | ICD-10-CM

## 2024-11-26 DIAGNOSIS — M54.16 LEFT LUMBAR RADICULITIS: ICD-10-CM

## 2024-11-26 PROCEDURE — 3079F DIAST BP 80-89 MM HG: CPT | Performed by: PHYSICIAN ASSISTANT

## 2024-11-26 PROCEDURE — 99214 OFFICE O/P EST MOD 30 MIN: CPT | Performed by: PHYSICIAN ASSISTANT

## 2024-11-26 PROCEDURE — 3075F SYST BP GE 130 - 139MM HG: CPT | Performed by: PHYSICIAN ASSISTANT

## 2024-11-26 RX ORDER — HYDROCHLOROTHIAZIDE 25 MG/1
25 TABLET ORAL DAILY
COMMUNITY
Start: 2024-10-18

## 2024-11-26 NOTE — PATIENT INSTRUCTIONS
Refill policies:    Allow 2-3 business days for refills; controlled substances may take longer.  Contact your pharmacy at least 5 days prior to running out of medication and have them send an electronic request or submit request through the “request refill” option in your [employer] account.  Refills are not addressed on weekends; covering physicians do not authorize routine medications on weekends.  No narcotics or controlled substances are refilled after noon on Fridays or by on call physicians.  By law, narcotics must be electronically prescribed.  A 30 day supply with no refills is the maximum allowed.  If your prescription is due for a refill, you may be due for a follow up appointment.  To best provide you care, patients receiving routine medications need to be seen at least once a year.  Patients receiving narcotic/controlled substance medications need to be seen at least once every 3 months.  In the event that your preferred pharmacy does not have the requested medication in stock (e.g. Backordered), it is your responsibility to find another pharmacy that has the requested medication available.  We will gladly send a new prescription to that pharmacy at your request.    Scheduling Tests:    If your physician has ordered radiology tests such as MRI or CT scans, please contact Central Scheduling at [phone] right away to schedule the test.  Once scheduled, the [facility] Centralized Referral Team will work with your insurance carrier to obtain pre-certification or prior authorization.  Depending on your insurance carrier, approval may take 3-10 days.  It is highly recommended patients assure they have received an authorization before having a test performed.  If test is done without insurance authorization, patient may be responsible for the entire amount billed.      Precertification and Prior Authorizations:  If your physician has recommended that you have a procedure or additional testing performed the [facility]  Centralized Referral Team will contact your insurance carrier to obtain pre-certification or prior authorization.    You are strongly encouraged to contact your insurance carrier to verify that your procedure/test has been approved and is a COVERED benefit.  Although the [facility] Centralized Referral Team does its due diligence, the insurance carrier gives the disclaimer that \"Although the procedure is authorized, this does not guarantee payment.\"    Ultimately the patient is responsible for payment.   Thank you for your understanding in this matter.  Paperwork Completion:  If you require FMLA or disability paperwork for your recovery, please make sure to either drop it off or have it faxed to our office at [phone]. Be sure the form has your name and date of birth on it.  The form will be faxed to our Forms Department and they will complete it for you.  There is a 25$ fee for all forms that need to be filled out.  Please be aware there is a 10-14 day turnaround time.  You will need to sign a release of information (LAVELLE) form if your paperwork does not come with one.  You may call the Forms Department with any questions at [phone].  Their fax number is [phone].

## 2024-11-26 NOTE — PROGRESS NOTES
Patient presents in office today with reported pain in lumbar spine, left side radiculopathy    Current pain level reported = 5/10    Last reported dose of ibuprofen 5 AM      Narcotic Contract renewal na    Urine Drug screen na

## 2024-11-26 NOTE — PROGRESS NOTES
HPI:   Jace Panda presents with complaints of low back pain with anterior thigh pain and numbness to the knee.    The pain is described as moderate numbness, aching that is intermittent.  The patient’s activity level has increased since last visit.  The pain is worst unrelated to time of day.    Changes in condition/history since last visit: here today for f/u, having had repeat LESI on 4/30/24 (previous on 9/6/22,  6/8/23, 4/2/24 ).  States that procedure was well-tolerated, and had no adverse effects.  Overall, reports 80% relief, and was very pleased with his response.  Over the past month or so, pain has returned in same distribution.      Last procedure: L3/4 KHADIJAH    Date: 4/30/24 (previous on 9/6/22, 6/8/23, 4/2/24 )    Percentage of relief experienced from the procedure: 80%    Duration of the relief: 6 months     The following activities will increase the patient’s pain: increased activity     The following activities decrease the patient’s pain: limiting activity     Functional Assessment: Patient reports that they are able to complete all of their ADL's such as eating, bathing, using the toilet, dressing and getting up from a bed or a chair independently.    Current Medications:  Current Outpatient Medications   Medication Sig Dispense Refill    hydroCHLOROthiazide 25 MG Oral Tab Take 1 tablet (25 mg total) by mouth daily.      PANTOPRAZOLE 40 MG Oral Tab EC TAKE 1 TABLET BY MOUTH BEFORE BREAKFAST 90 tablet 0    zolpidem 10 MG Oral Tab Take 1 tablet (10 mg total) by mouth nightly. 30 tablet 0    CANDESARTAN 32 MG Oral Tab TAKE 1 TABLET BY MOUTH DAILY. 90 tablet 0    Tirzepatide (MOUNJARO) 2.5 MG/0.5ML Subcutaneous Solution Pen-injector Inject 2.5 mg into the skin once a week. 6 mL 0    amLODIPine 5 MG Oral Tab Take 1 tablet (5 mg total) by mouth daily. 90 tablet 0    Testosterone cypionate (DEPOTESTOTERONE) 200 MG/ML Intramuscular Solution Inject 0.5 mL (100 mg total) into the muscle See Admin  Instructions. Every 10 days.          Patient requires assistance with: No assistance required    Reviewed Patient History Dated: 4/30/24 no changes noted    Physical Exam:   There were no vitals taken for this visit.  VAS Pain Score:  6/10  General Appearance: Well developed, well nourished, normal build, independent body habitus, no apparent physical disabilities, well groomed    Neurological Exam: WNL-Orientation to time, place and person, normal mood & effect, normal concentration & attention span  Inspection: non-antalgic, no acute distress  Radiology/Lab Test Reviewed: MRI:  L1-L2:  There is diffuse endplate osteophyte, facet hypertrophy and disc bulge.  There is mild to moderate central canal stenosis.  There is moderate subarticular and foraminal stenosis.   L2-L3:  There is diffuse endplate osteophyte, disc bulge and facet hypertrophy.  There is mild central canal stenosis.  There is moderate left subarticular and foraminal stenosis.  There is mild right subarticular and foraminal stenosis.   L3-L4:  There is diffuse endplate osteophyte, disc bulge and facet hypertrophy.  There is mild to moderate central canal stenosis.  There is moderate left subarticular and foraminal stenosis.  There is mild right subarticular and foraminal stenosis.   L4-L5:  There is diffuse disc bulge, endplate osteophyte and facet hypertrophy.  There is no central canal stenosis.  There is moderate right subarticular and foraminal stenosis.  There is mild left subarticular and foraminal stenosis.   L5-S1:  There is facet hypertrophy.  There is right parasagittal disc protrusion and endplate osteophyte.  There is no significant stenosis of central canal or foramina        Lab Results   Component Value Date    WBC 9.5 07/18/2024    WBC 8.4 02/17/2024    WBC 9.7 05/07/2022   No results found for: \"HEMOGLOBIN\"  Lab Results   Component Value Date    .0 07/18/2024    .0 02/17/2024    .0 05/07/2022     Do you have any  known blood/bleeding disorders?  No  Does patient currently take blood thinners?   None  Does patient currently take any antibiotics?   No  Patient educated and verbalized understanding.  Medical Decision Making:   Diagnosis:    Encounter Diagnoses   Name Primary?    Protrusion of lumbar intervertebral disc Yes    Left lumbar radiculitis      Impression: 80% relief with L3-4 epidural steroid injection on 4/30/24, and for 6 months, was pleased with his response.  Over the past month, pain has returned, and wishes to repeat procedure.  He has known L3-4 stenosis, and we will simply proceed with repeat L3-4 epidural steroid injection, risks and benefits reviewed in detail.    Plan: L3-4 interlaminar epidural steroid injection at his earliest convenience, pending insurance approval.  Risk and benefits reviewed in detail.  Will see him back in 2 to 3 weeks after procedure.    No orders of the defined types were placed in this encounter.      Meds & Refills for this Visit:  Requested Prescriptions      No prescriptions requested or ordered in this encounter       Imaging & Consults:  None    The patient indicates understanding of these issues and agrees to the plan.    TANIA Cazares

## 2024-12-02 ENCOUNTER — OFFICE VISIT (OUTPATIENT)
Dept: INTERNAL MEDICINE CLINIC | Facility: CLINIC | Age: 49
End: 2024-12-02
Payer: COMMERCIAL

## 2024-12-02 VITALS
DIASTOLIC BLOOD PRESSURE: 78 MMHG | TEMPERATURE: 98 F | SYSTOLIC BLOOD PRESSURE: 112 MMHG | HEART RATE: 76 BPM | OXYGEN SATURATION: 97 % | WEIGHT: 244.81 LBS | HEIGHT: 70 IN | BODY MASS INDEX: 35.05 KG/M2

## 2024-12-02 DIAGNOSIS — Z00.00 LABORATORY EXAMINATION ORDERED AS PART OF A ROUTINE GENERAL MEDICAL EXAMINATION: ICD-10-CM

## 2024-12-02 DIAGNOSIS — Z12.11 COLON CANCER SCREENING: ICD-10-CM

## 2024-12-02 DIAGNOSIS — Z00.00 WELLNESS EXAMINATION: Primary | ICD-10-CM

## 2024-12-02 DIAGNOSIS — E11.9 TYPE 2 DIABETES MELLITUS WITHOUT COMPLICATION, WITHOUT LONG-TERM CURRENT USE OF INSULIN (HCC): ICD-10-CM

## 2024-12-02 RX ORDER — TIRZEPATIDE 5 MG/.5ML
5 INJECTION, SOLUTION SUBCUTANEOUS WEEKLY
Qty: 2 ML | Refills: 1 | Status: SHIPPED | OUTPATIENT
Start: 2024-12-02

## 2024-12-02 NOTE — PROGRESS NOTES
Jace Panda is a 49 year old male who presents for a complete physical exam.   HPI:       Wt Readings from Last 6 Encounters:   12/02/24 244 lb 12.8 oz (111 kg)   11/26/24 248 lb (112.5 kg)   10/02/24 248 lb 12.8 oz (112.9 kg)   08/26/24 259 lb (117.5 kg)   07/24/24 281 lb 12.8 oz (127.8 kg)   05/15/24 266 lb 1.6 oz (120.7 kg)     Body mass index is 35.13 kg/m².     Cholesterol, Total (mg/dL)   Date Value   02/17/2024 189   05/07/2022 167   04/09/2016 172     CHOLESTEROL, TOTAL (mg/dL)   Date Value   09/06/2019 199   03/01/2018 201 (H)   12/31/2013 214 (H)     HDL Cholesterol (mg/dL)   Date Value   02/17/2024 48   05/07/2022 47   04/09/2016 46     HDL CHOLESTEROL (mg/dL)   Date Value   09/06/2019 49   03/01/2018 32 (L)   12/31/2013 47     LDL Cholesterol (mg/dL)   Date Value   02/17/2024 111 (H)   05/07/2022 102 (H)   04/09/2016 87     LDL-CHOLESTEROL (mg/dL (calc))   Date Value   09/06/2019 122 (H)   03/01/2018 144 (H)   12/31/2013 106     AST   Date Value   07/18/2024 35 U/L (H)   02/17/2024 31 U/L   05/07/2022 36 U/L   03/08/2022 35   09/06/2019 32 U/L   03/01/2018 26 U/L     ALT   Date Value   07/18/2024 38 U/L   02/17/2024 41 U/L   05/07/2022 44 U/L   03/08/2022 33   09/06/2019 32 U/L   03/01/2018 49 U/L (H)      Current Outpatient Medications   Medication Sig Dispense Refill    hydroCHLOROthiazide 25 MG Oral Tab Take 1 tablet (25 mg total) by mouth daily.      PANTOPRAZOLE 40 MG Oral Tab EC TAKE 1 TABLET BY MOUTH BEFORE BREAKFAST 90 tablet 0    zolpidem 10 MG Oral Tab Take 1 tablet (10 mg total) by mouth nightly. 30 tablet 0    CANDESARTAN 32 MG Oral Tab TAKE 1 TABLET BY MOUTH DAILY. 90 tablet 0    Tirzepatide (MOUNJARO) 2.5 MG/0.5ML Subcutaneous Solution Pen-injector Inject 2.5 mg into the skin once a week. 6 mL 0    amLODIPine 5 MG Oral Tab Take 1 tablet (5 mg total) by mouth daily. 90 tablet 0    Testosterone cypionate (DEPOTESTOTERONE) 200 MG/ML Intramuscular Solution Inject 0.5 mL (100 mg total) into  the muscle See Admin Instructions. Every 10 days.        Past Medical History:    Carpal tunnel syndrome on both sides    hands, R hand repaired through surgery, L hand unrepaired    Cellulitis    arm    Elevated blood sugar    Essential hypertension, benign    Low testosterone    Unspecified essential hypertension      Past Surgical History:   Procedure Laterality Date    Back surgery  10/11    Lumbar area, treated with needle and heat 10/11    Orchiectomy   Left 10/2016    Herminio Vasquez    s/p radiation       Other surgical history      R carpal, Neuroplasty Decompression Median Nerve  At Carpal tunnel     Tonsillectomy      As a child    Vasectomy  10/2016     Herminio Vasquez      Family History   Problem Relation Age of Onset    Hypertension Father       Social History:  Social History     Socioeconomic History    Marital status:    Tobacco Use    Smoking status: Every Day     Current packs/day: 1.00     Average packs/day: 1 pack/day for 1 year (1.0 ttl pk-yrs)     Types: Cigarettes    Smokeless tobacco: Never    Tobacco comments:     started again 2020, previously Quit 12/2009 1 1/2 ppd since 1991. Unspecified tabacco product   Vaping Use    Vaping status: Never Used   Substance and Sexual Activity    Alcohol use: Not Currently     Comment: social    Drug use: Yes     Frequency: 1.0 times per week     Types: Cannabis   Other Topics Concern    Caffeine Concern Yes     Comment: 3-4 cans of big red bulls daily and 8 cans of pop daily.     Exercise Yes     Comment: 6x/week.       .        REVIEW OF SYSTEMS:   GENERAL: feels well otherwise  SKIN: denies rash  HEENT: denies nasal congestion, sinus pain or ST  LUNGS: denies shortness of breath    smokes but is cutting down    CARDIOVASCULAR: denies chest pain on exertion   had to restart hctz as BP was going up     GI: denies abdominal pain  : denies dysuria  sees urology   NEURO: denies headaches  PSYCHE: denies depression or anxiety  HEMATOLOGIC: denies hx of  anemia  ENDOCRINE: denies thyroid history  ALL/ASTHMA: denies hx of allergy or asthma    EXAM:   /78 (BP Location: Left arm, Patient Position: Sitting, Cuff Size: large)   Pulse 76   Temp 97.8 °F (36.6 °C) (Temporal)   Ht 5' 10\" (1.778 m)   Wt 244 lb 12.8 oz (111 kg)   SpO2 97%   BMI 35.13 kg/m²   Body mass index is 35.13 kg/m².   GENERAL: well developed, well nourished,in no apparent distress  SKIN: no rashes,  HEENT: atraumatic, normocephalic,ears and throat are clear  NECK: supple,no adenopathy  LUNGS: clear to auscultation  CARDIO: RRR without murmur  GI: good BS's,no masses, HSM or tenderness  EXTREMITIES: no edema  NEURO: motor and sensory are grossly intact    ASSESSMENT AND PLAN:   Jace Panda is a 49 year old male who presents for a complete physical exam.  Health maintenance, will check fasting Lipids, CMP, CBC A1c and UA.   Flu shot today   to rtc for shingrix     BP good   CPM    Mounjaro increased to 5   kwaku in Feb      Dr barron for cscope   The patient indicates understanding of these issues and agrees to the plan.  The patient is asked to return for CPX in 1yr.

## 2024-12-03 ENCOUNTER — TELEPHONE (OUTPATIENT)
Dept: PAIN CLINIC | Facility: CLINIC | Age: 49
End: 2024-12-03

## 2024-12-03 ENCOUNTER — HOSPITAL ENCOUNTER (EMERGENCY)
Facility: HOSPITAL | Age: 49
Discharge: HOME | End: 2024-12-03
Attending: EMERGENCY MEDICINE
Payer: COMMERCIAL

## 2024-12-03 ENCOUNTER — APPOINTMENT (OUTPATIENT)
Dept: CT IMAGING | Facility: HOSPITAL | Age: 49
End: 2024-12-03
Attending: EMERGENCY MEDICINE
Payer: COMMERCIAL

## 2024-12-03 ENCOUNTER — APPOINTMENT (OUTPATIENT)
Dept: GENERAL RADIOLOGY | Facility: HOSPITAL | Age: 49
End: 2024-12-03
Attending: EMERGENCY MEDICINE
Payer: COMMERCIAL

## 2024-12-03 VITALS
TEMPERATURE: 99 F | DIASTOLIC BLOOD PRESSURE: 81 MMHG | BODY MASS INDEX: 35.03 KG/M2 | HEIGHT: 70 IN | OXYGEN SATURATION: 95 % | WEIGHT: 244.69 LBS | RESPIRATION RATE: 23 BRPM | SYSTOLIC BLOOD PRESSURE: 140 MMHG | HEART RATE: 84 BPM

## 2024-12-03 DIAGNOSIS — S19.80XA BLUNT TRAUMA OF NECK, INITIAL ENCOUNTER: Primary | ICD-10-CM

## 2024-12-03 DIAGNOSIS — S46.311A RUPTURE OF RIGHT TRICEPS TENDON, INITIAL ENCOUNTER: ICD-10-CM

## 2024-12-03 LAB
ALBUMIN SERPL-MCNC: 4.7 G/DL (ref 3.2–4.8)
ALBUMIN/GLOB SERPL: 2.1 {RATIO} (ref 1–2)
ALP LIVER SERPL-CCNC: 96 U/L
ALT SERPL-CCNC: 25 U/L
ANION GAP SERPL CALC-SCNC: 7 MMOL/L (ref 0–18)
AST SERPL-CCNC: 35 U/L (ref ?–34)
BASOPHILS # BLD AUTO: 0.14 X10(3) UL (ref 0–0.2)
BASOPHILS NFR BLD AUTO: 1.3 %
BILIRUB SERPL-MCNC: 0.6 MG/DL (ref 0.3–1.2)
BUN BLD-MCNC: 18 MG/DL (ref 9–23)
CALCIUM BLD-MCNC: 9.3 MG/DL (ref 8.7–10.4)
CHLORIDE SERPL-SCNC: 106 MMOL/L (ref 98–112)
CO2 SERPL-SCNC: 28 MMOL/L (ref 21–32)
CREAT BLD-MCNC: 1.17 MG/DL
EGFRCR SERPLBLD CKD-EPI 2021: 75 ML/MIN/1.73M2 (ref 60–?)
EOSINOPHIL # BLD AUTO: 0.33 X10(3) UL (ref 0–0.7)
EOSINOPHIL NFR BLD AUTO: 3 %
ERYTHROCYTE [DISTWIDTH] IN BLOOD BY AUTOMATED COUNT: 12.9 %
GLOBULIN PLAS-MCNC: 2.2 G/DL (ref 2–3.5)
GLUCOSE BLD-MCNC: 108 MG/DL (ref 70–99)
HCT VFR BLD AUTO: 49.9 %
HGB BLD-MCNC: 17.2 G/DL
IMM GRANULOCYTES # BLD AUTO: 0.05 X10(3) UL (ref 0–1)
IMM GRANULOCYTES NFR BLD: 0.5 %
LYMPHOCYTES # BLD AUTO: 1.39 X10(3) UL (ref 1–4)
LYMPHOCYTES NFR BLD AUTO: 12.8 %
MCH RBC QN AUTO: 30.1 PG (ref 26–34)
MCHC RBC AUTO-ENTMCNC: 34.5 G/DL (ref 31–37)
MCV RBC AUTO: 87.4 FL
MONOCYTES # BLD AUTO: 0.77 X10(3) UL (ref 0.1–1)
MONOCYTES NFR BLD AUTO: 7.1 %
NEUTROPHILS # BLD AUTO: 8.15 X10 (3) UL (ref 1.5–7.7)
NEUTROPHILS # BLD AUTO: 8.15 X10(3) UL (ref 1.5–7.7)
NEUTROPHILS NFR BLD AUTO: 75.3 %
OSMOLALITY SERPL CALC.SUM OF ELEC: 294 MOSM/KG (ref 275–295)
PLATELET # BLD AUTO: 299 10(3)UL (ref 150–450)
POTASSIUM SERPL-SCNC: 3.6 MMOL/L (ref 3.5–5.1)
PROT SERPL-MCNC: 6.9 G/DL (ref 5.7–8.2)
RBC # BLD AUTO: 5.71 X10(6)UL
SODIUM SERPL-SCNC: 141 MMOL/L (ref 136–145)
WBC # BLD AUTO: 10.8 X10(3) UL (ref 4–11)

## 2024-12-03 PROCEDURE — 70498 CT ANGIOGRAPHY NECK: CPT | Performed by: EMERGENCY MEDICINE

## 2024-12-03 PROCEDURE — 96374 THER/PROPH/DIAG INJ IV PUSH: CPT

## 2024-12-03 PROCEDURE — 80053 COMPREHEN METABOLIC PANEL: CPT | Performed by: EMERGENCY MEDICINE

## 2024-12-03 PROCEDURE — 99285 EMERGENCY DEPT VISIT HI MDM: CPT

## 2024-12-03 PROCEDURE — 96376 TX/PRO/DX INJ SAME DRUG ADON: CPT

## 2024-12-03 PROCEDURE — 31575 DIAGNOSTIC LARYNGOSCOPY: CPT

## 2024-12-03 PROCEDURE — 85025 COMPLETE CBC W/AUTO DIFF WBC: CPT | Performed by: EMERGENCY MEDICINE

## 2024-12-03 PROCEDURE — 96375 TX/PRO/DX INJ NEW DRUG ADDON: CPT

## 2024-12-03 PROCEDURE — 0CJS8ZZ INSPECTION OF LARYNX, VIA NATURAL OR ARTIFICIAL OPENING ENDOSCOPIC: ICD-10-PCS | Performed by: EMERGENCY MEDICINE

## 2024-12-03 PROCEDURE — 73080 X-RAY EXAM OF ELBOW: CPT | Performed by: EMERGENCY MEDICINE

## 2024-12-03 RX ORDER — PREDNISONE 20 MG/1
TABLET ORAL
Qty: 15 TABLET | Refills: 0 | Status: SHIPPED | OUTPATIENT
Start: 2024-12-03

## 2024-12-03 RX ORDER — ONDANSETRON 2 MG/ML
4 INJECTION INTRAMUSCULAR; INTRAVENOUS ONCE
Status: COMPLETED | OUTPATIENT
Start: 2024-12-03 | End: 2024-12-03

## 2024-12-03 RX ORDER — HYDROMORPHONE HYDROCHLORIDE 1 MG/ML
0.5 INJECTION, SOLUTION INTRAMUSCULAR; INTRAVENOUS; SUBCUTANEOUS EVERY 30 MIN PRN
Status: DISCONTINUED | OUTPATIENT
Start: 2024-12-03 | End: 2024-12-03

## 2024-12-03 RX ORDER — TRAMADOL HYDROCHLORIDE 50 MG/1
TABLET ORAL EVERY 6 HOURS PRN
Qty: 15 TABLET | Refills: 0 | Status: SHIPPED | OUTPATIENT
Start: 2024-12-03 | End: 2024-12-08

## 2024-12-03 RX ORDER — NAPROXEN 500 MG/1
500 TABLET ORAL 2 TIMES DAILY PRN
Qty: 20 TABLET | Refills: 0 | Status: SHIPPED | OUTPATIENT
Start: 2024-12-03

## 2024-12-03 RX ORDER — DEXAMETHASONE SODIUM PHOSPHATE 10 MG/ML
10 INJECTION, SOLUTION INTRAMUSCULAR; INTRAVENOUS ONCE
Status: COMPLETED | OUTPATIENT
Start: 2024-12-03 | End: 2024-12-03

## 2024-12-03 RX ORDER — HYDROMORPHONE HYDROCHLORIDE 1 MG/ML
1 INJECTION, SOLUTION INTRAMUSCULAR; INTRAVENOUS; SUBCUTANEOUS ONCE
Status: COMPLETED | OUTPATIENT
Start: 2024-12-03 | End: 2024-12-03

## 2024-12-03 NOTE — ED INITIAL ASSESSMENT (HPI)
Patient was at the gym and was bench pressing 405lb when his right elbow gave way, causing the weight to fall on his upper chest/neck. Has hoarse voice. Patient denies shortness of breath.

## 2024-12-03 NOTE — ED PROVIDER NOTES
Patient Seen in: [facility] Emergency Department      History     Chief Complaint   Patient presents with    Trauma 1 & 2     Stated Complaint: 405 barbell dropped on throat, right elbow blew    Subjective:   HPI      49-year-old male was at the gym and was doing a chest press that was 405 pounds.  He states that his right elbow gave out and the weight fell on his throat.  He states he \"blew out his right elbow\" no previous significant elbow issues.  He has had carpal tunnel.  He is right-hand dominant.  He states it fell directly on his neck and he is having difficulty speaking but does not feel short of breath.  He states his pain is primarily in his elbow.  No other injuries or complaints.    Objective:     Past Medical History:    Carpal tunnel syndrome on both sides    hands, R hand repaired through surgery, L hand unrepaired    Cellulitis    arm    Elevated blood sugar    Essential hypertension, benign    Low testosterone    Unspecified essential hypertension              Past Surgical History:   Procedure Laterality Date    Back surgery  10/11    Lumbar area, treated with needle and heat 10/11    Orchiectomy   Left 10/2016    [facility]    s/p radiation       Other surgical history      R carpal, Neuroplasty Decompression Median Nerve  At Carpal tunnel     Tonsillectomy      As a child    Vasectomy  10/2016     [facility]                Social History     Socioeconomic History    Marital status:    Tobacco Use    Smoking status: Every Day     Current packs/day: 1.00     Average packs/day: 1 pack/day for 1 year (1.0 ttl pk-yrs)     Types: Cigarettes    Smokeless tobacco: Never    Tobacco comments:     started again 2020, previously Quit 12/2009 1 1/2 ppd since 1991. Unspecified tabacco product   Vaping Use    Vaping status: Never Used   Substance and Sexual Activity    Alcohol use: Not Currently     Comment: social    Drug use: Yes     Frequency: 1.0 times per week     Types: Cannabis   Other  Topics Concern    Caffeine Concern Yes     Comment: 3-4 cans of big red bulls daily and 8 cans of pop daily.     Exercise Yes     Comment: 6x/week.                   Physical Exam     ED Triage Vitals [12/03/24 1515]   /72   Pulse 103   Resp 19   Temp 98.9 °F (37.2 °C)   Temp src Temporal   SpO2 99 %   O2 Device None (Room air)       Current Vitals:   Vital Signs  BP: 140/81  Pulse: 84  Resp: 23  Temp: 98.9 °F (37.2 °C)  Temp src: Temporal  MAP (mmHg): 98    Oxygen Therapy  SpO2: 95 %  O2 Device: None (Room air)        Physical Exam  Vitals and nursing note reviewed.   Constitutional:       General: He is not in acute distress.     Appearance: Normal appearance. He is well-developed.   HENT:      Head: Normocephalic and atraumatic.   Neck:      Comments: Abrasion to anterior portion of his neck with tenderness but no obvious expanding hematoma, no stridor but hoarse voice  Cardiovascular:      Rate and Rhythm: Normal rate and regular rhythm.      Pulses: Normal pulses.      Heart sounds: Normal heart sounds.   Pulmonary:      Effort: Pulmonary effort is normal.      Breath sounds: Normal breath sounds. No stridor.   Abdominal:      General: Bowel sounds are normal.      Palpations: Abdomen is soft.   Musculoskeletal:         General: Swelling, tenderness and signs of injury present.      Cervical back: Normal range of motion and neck supple. Tenderness present.      Comments: Swelling posterior right elbow and tenderness at the insertion of his tricep bicep tendon appears to be intact equal pulses throughout with good capillary refill   Lymphadenopathy:      Cervical: No cervical adenopathy.   Skin:     General: Skin is warm and dry.   Neurological:      General: No focal deficit present.      Mental Status: He is alert and oriented to person, place, and time.            ED Course     Labs Reviewed   COMP METABOLIC PANEL (14) - Abnormal; Notable for the following components:       Result Value    Glucose 108  (*)     AST 35 (*)     A/G Ratio 2.1 (*)     All other components within normal limits   CBC WITH DIFFERENTIAL WITH PLATELET - Abnormal; Notable for the following components:    RBC 5.71 (*)     Neutrophil Absolute Prelim 8.15 (*)     Neutrophil Absolute 8.15 (*)     All other components within normal limits   RAINBOW DRAW LAVENDER   RAINBOW DRAW LIGHT GREEN   RAINBOW DRAW BLUE            CTA CAROTID ARTERIES (CPT=70498)    Result Date: 12/3/2024  PROCEDURE:  CTA CAROTID ARTERIES (CPT=70498)  COMPARISON:  None.  INDICATIONS:  405 barbell dropped on throat, right elbow blew  TECHNIQUE:  Contrast-enhanced multislice CT angiography of the neck vasculature from AP window to Sella was performed using nonionic contrast. 3D volume rendering images were obtained by the technologist as well as the radiologist on an independent workstation. Multiplanar 3D reformatted imaging including multiplanar MIP images were obtained. Curved planar reformats were performed through the carotid and vertebral arteries. All measurements obtained in this exam were performed using NASCET criteria. Dose reduction techniques were used. Dose information is transmitted to the ACR (American College of Radiology) NRDR (National Radiology Data Registry) which includes the Dose Index Registry.   PATIENT STATED HISTORY:(As transcribed by Technologist)  405 barbell dropped on throat.   CONTRAST USED:  75cc of Isovue 370  FINDINGS:  AORTIC ARCH:                       Unremarkable INNOMINATE ARTERY:                       Unremarkable  RIGHT SUBCLAVIAN ARTERY:    No hemodynamically significant stenosis or dissection. COMMON CAROTID:    No hemodynamically significant stenosis or dissection. CAROTID BULB:    No hemodynamically significant stenosis or dissection. INTERNAL CAROTID:    No hemodynamically significant stenosis or dissection. EXTERNAL CAROTID:    No hemodynamically significant stenosis or dissection. VERTEBRAL:    No hemodynamically significant  stenosis or dissection.  LEFT SUBCLAVIAN ARTERY:    No hemodynamically significant stenosis or dissection. COMMON CAROTID:    No hemodynamically significant stenosis or dissection. CAROTID BULB:                 No hemodynamically significant stenosis or dissection. INTERNAL CAROTID:   No hemodynamically significant stenosis or dissection. EXTERNAL CAROTID:   No hemodynamically significant stenosis or dissection. VERTEBRAL:   No hemodynamically significant stenosis or dissection.  BONES:    No bony lesion or fracture.  LUNG APICES:    No visible pulmonary or pleural disease.  OTHER:  The visualized soft tissues of the neck are also unremarkable.             CONCLUSION:  No acute abnormality on CT angiography of the neck.   LOCATION:  [facility]   Dictated by (CST): Scottie Rocha MD on 12/03/2024 at 5:23 PM     Finalized by (CST): Scottie Rocha MD on 12/03/2024 at 5:28 PM       XR ELBOW, COMPLETE (MIN 3 VIEWS), RIGHT (CPT=73080)    Result Date: 12/3/2024  PROCEDURE:  XR ELBOW, COMPLETE (MIN 3 VIEWS), RIGHT (CPT=73080)  TECHNIQUE:  Three views were obtained.  COMPARISON:  None.  INDICATIONS:  405 barbell dropped on throat, right elbow blew  PATIENT STATED HISTORY: (As transcribed by Technologist)   barbell dropped on throat, right elbow blew.    FINDINGS:  No acute fractures or osseous lesions are identified.  Radial capitellar relationship is within normal limits.  No significant elbow joint effusion.  Well corticated ossific density adjacent to the lateral epicondyle.  Soft tissue swelling.            CONCLUSION:  No acute fractures.  Mild soft tissue swelling along the dorsal aspect of the elbow.   LOCATION:  [id number]    Dictated by (CST): Elzbieta Vlilarreal MD on 12/03/2024 at 3:56 PM     Finalized by (CST): Elzbieta Villarreal MD on 12/03/2024 at 3:57 PM             MDM      Patient had an IV established and blood work obtained.  Patient's O2 saturation is 98%.  He is tolerating secretions and has no respiratory issues  there is no stridor but has clear hoarseness.  Patient initially had x-rays of his right elbow I reviewed the films not appreciating fractures.  He reviewed radiology report they note some soft tissue swelling but do not appreciate any acute fracture either.  Patient was reexamined and does seem to have the most discomfort at his tricep insertion.  He does seem to be somewhat boggy in this area as well bicep tendon appears to be intact he is able to flex without difficulty does have discomfort with full extension.    In regards to his neck we did do a CT scan with CTA and there was no vascular injury and there was no significant traumatic injury that was appreciated however he continues to have a hoarse voice.  Therefore I contacted ENT.  They graciously came and did a laryngoscope on this patient and although there is no distinct trauma they did note some soft tissue swelling and recommended steroids.    Patient was given a sling.  He did require repeat pain medications he is advised to do ice again his pain is primarily in his elbow and not in his neck or throat.  He is to return should he have any difficulty with breathing or swallowing.  He was given a dose of Decadron.  He will be given further pain medications for his arm he is advised to call orthopedics tomorrow for follow-up.  Patient was discharged in good condition        Medical Decision Making      Disposition and Plan     Clinical Impression:  1. Blunt trauma of neck, initial encounter    2. Rupture of right triceps tendon, initial encounter         Disposition:  Discharge  12/3/2024  7:12 pm    Follow-up:  Gumaro Mcdonough MD  [address] [facility] [address] [phone]    Schedule an appointment as soon as possible for a visit  As needed    Eugenio Rubin MD  [address] [address] [phone]    Schedule an appointment as soon as possible for a visit            Medications Prescribed:  Discharge Medication List as  of 12/3/2024  7:14 PM        START taking these medications    Details   traMADol 50 MG Oral Tab Take 1-2 tablets ( mg total) by mouth every 6 (six) hours as needed for Pain., Normal, Disp-15 tablet, R-0      naproxen 500 MG Oral Tab Take 1 tablet (500 mg total) by mouth 2 (two) times daily as needed., Normal, Disp-20 tablet, R-0      predniSONE 20 MG Oral Tab 40 milligrams for 3 days then reduce to 30 mg for 3 days then 20 mg for 3 days, Normal, Disp-15 tablet, R-0      Naloxone HCl 4 MG/0.1ML Nasal Liquid 4 mg by Intranasal route as needed (May repeat as needed in other nostril if symptoms persist). If patient remains unresponsive, repeat dose in other nostril 2-5 minutes after first dose., Normal, Disp-1 kit, R-0                 Supplementary Documentation:

## 2024-12-03 NOTE — TELEPHONE ENCOUNTER
Prior authorization request completed for: LAURACATALINO L3/4  Authorization # no auth needed  Pre-D: no  Exclusions/Restrictions: no  Covered Benefit: yes   Authorization dates: n/a  CPT codes approved: 35951  Number of visits/dates of service approved: 1  Physician: rian  Location: [facility]   Call Ref#: [id number]  Representative Name: kulwinder  Insurance Carrier: [phone]     Patient can be scheduled. Routed to Navigator.

## 2024-12-04 ENCOUNTER — TELEPHONE (OUTPATIENT)
Facility: CLINIC | Age: 49
End: 2024-12-04

## 2024-12-04 ENCOUNTER — HOSPITAL ENCOUNTER (OUTPATIENT)
Dept: MRI IMAGING | Facility: HOSPITAL | Age: 49
Discharge: HOME | End: 2024-12-04
Attending: PHYSICIAN ASSISTANT
Payer: COMMERCIAL

## 2024-12-04 ENCOUNTER — OFFICE VISIT (OUTPATIENT)
Dept: ORTHOPEDICS CLINIC | Facility: CLINIC | Age: 49
End: 2024-12-04
Payer: COMMERCIAL

## 2024-12-04 VITALS — BODY MASS INDEX: 35.79 KG/M2 | HEIGHT: 70 IN | WEIGHT: 250 LBS

## 2024-12-04 DIAGNOSIS — S46.311A RUPTURE OF RIGHT TRICEPS TENDON, INITIAL ENCOUNTER: Primary | ICD-10-CM

## 2024-12-04 DIAGNOSIS — S46.311A RUPTURE OF RIGHT TRICEPS TENDON, INITIAL ENCOUNTER: ICD-10-CM

## 2024-12-04 PROCEDURE — 3008F BODY MASS INDEX DOCD: CPT | Performed by: PHYSICIAN ASSISTANT

## 2024-12-04 PROCEDURE — 99214 OFFICE O/P EST MOD 30 MIN: CPT | Performed by: PHYSICIAN ASSISTANT

## 2024-12-04 PROCEDURE — 73221 MRI JOINT UPR EXTREM W/O DYE: CPT | Performed by: PHYSICIAN ASSISTANT

## 2024-12-04 NOTE — CONSULTS
[facility]  Otolaryngology-Head and Neck Surgery     Consultation Note    Jace Panda Patient Status:  Emergency    10/12/1975 MRN [id number]   Location [facility] EMERGENCY DEPARTMENT Attending Nabila Valenzuela MD   Hosp Day # 0 PCP Mich Iglesias MD     Reason for Consultation:  Airway eval    History of Present Illness:  Jace Panda is a(n) 49 year old male patient currently admitted for neck, chest, and elbow injury. Otolaryngology was consulted by the Primary Team regarding airway evaluation. The patient was at the gym and was doing bench presses with 405lb. His elbow gave out in the middle of this session, causing the weight to fall onto his upper chest and lower neck. Since then, he developed hoarseness, but no throat or neck pain, no dysphagia, no odynophagia, and no respiratory distress. A neck CT was obtained, demonstrating no neck injuries. Given the hoarseness, ENT was consulted for airway evaluation.     Past Medical History:  Past Medical History:    Carpal tunnel syndrome on both sides    hands, R hand repaired through surgery, L hand unrepaired    Cellulitis    arm    Elevated blood sugar    Essential hypertension, benign    Low testosterone    Unspecified essential hypertension     Past Surgical History:   Procedure Laterality Date    Back surgery  10/11    Lumbar area, treated with needle and heat 10/11    Orchiectomy   Left 10/2016    [facility]    s/p radiation       Other surgical history      R carpal, Neuroplasty Decompression Median Nerve  At Carpal tunnel     Tonsillectomy      As a child    Vasectomy  10/2016     [facility]     Allergies[1]  Family History   Problem Relation Age of Onset    Hypertension Father      Patient  reports that he has been smoking cigarettes. He has a 1 pack-year smoking history. He has never used smokeless tobacco. He reports that he does not currently use alcohol. He reports current drug use. Frequency: 1.00 time per week. Drug:  Cannabis.    Medications:    Current Facility-Administered Medications:     HYDROmorphone (Dilaudid) 1 MG/ML injection 0.5 mg, 0.5 mg, Intravenous, Q30 Min PRN    dexAMETHasone PF (Decadron) 10 MG/ML injection 10 mg, 10 mg, Intravenous, Once    Review of Systems:  Pertinent items are noted in HPI.    Physical Exam:  Blood pressure 140/81, pulse 79, temperature 98.9 °F (37.2 °C), temperature source Temporal, resp. rate 23, height 5' 10\" (1.778 m), weight 244 lb 11.4 oz (111 kg), SpO2 98%.  General: The patient is no acute distress and is alert and oriented. The patient's head is normocephalic, without significant deformity or asymmetry. There are no significant facial scars or masses.   Skin: No skin abnormality  Psych: Normal mood and affect. Judgement and insight seem intact.  Eyes: Sclerae anicteric, without lid ptosis bilaterally. Eyes have full range of movement to gross exam.  Ears: Pinna are normal in shape and position. No external ear deformity.    Nose: No external nasal trauma or obvious deformity.    OC/OP: Tongue is fully mobile and protrudes midline. FOM is soft. Palate elevates midline. Mucous membranes are moist and pink.  Face: Facial sensation is intact in all divisions and symmetric. Facial motion is full and symmetric in all distributions of the facial nerve.  Neck: Supple neck with normal muscular and laryngeal landmarks. No ecchymosis or edema at the anterior neck. There is no significant lymphadenopathy palpated.   Resp: Normal WOB without stertor or stridor.  CV: Extremities are well perfused.  Neuro: Cognition is intact, CN II-XII grossly intact. No focal deficits.  Musculoskeletal: Moves all extremities well without deformities.    Laboratory Data:  Recent Labs   Lab 12/03/24  1520   RBC 5.71*   HGB 17.2   HCT 49.9   MCV 87.4   MCH 30.1   MCHC 34.5   RDW 12.9   NEPRELIM 8.15*   WBC 10.8   .0     Recent Labs   Lab 12/03/24  1520   *   BUN 18   CREATSERUM 1.17   EGFRCR 75   CA  9.3      K 3.6      CO2 28.0     Imaging:  CTA carotid 12/3/24: No acute abnormality on CT angiography of the neck.     Procedures:  Flexible laryngoscopy. See procedure note.     Impression and Plan:  Patient Active Problem List   Diagnosis    Lumbago    Esophageal reflux    Umbilical hernia    Essential hypertension, benign    Elevated blood sugar    Testosterone deficiency    Leukocytosis    Testicle cancer (HCC)    Obesity (BMI 30-39.9)    Type 2 diabetes mellitus without complication, without long-term current use of insulin (HCC)    Elevated hematocrit       Jace Panda is a 49 year old male patient currently admitted for neck, chest, and elbow injury. Our team was consulted for airway evaluation.     Our recommendations are:  - No edema of the glottis. Flexible laryngoscopy demonstrated trace edema of the epiglottis and arytenoid towers. The edema does NOT extend into the true vocal cords  - Recommend treatment with tapered steroids: 26-15-59-58-93-60-10-10 mg taper, each dose for 1 day then stop  - Advised patient to seek medical attention if he is experiencing dysphagia, odynophagia, or respiratory distress. Would anticipate his hoarseness to gradually improve over time  - Follow up with ENT as needed    Total time spent with patient:  15 Minutes  Time spent on counseling/coordination of care:  30 Minutes    Gumaro Mcdonough MD  Otolaryngology--Head and Neck Surgery  [facility]  12/3/2024  6:45 PM         [1]   Allergies  Allergen Reactions    Ace Inhibitors Coughing    Vicodin [Hydrocodone-Acetaminophen] NAUSEA ONLY

## 2024-12-04 NOTE — H&P (VIEW-ONLY)
[facility] - ORTHOPEDICS  [address] [place] [address]  [phone]     NEW PATIENT VISIT - HISTORY AND PHYSICAL EXAMINATION     Name: Jace Panda   MRN: [id number]  Date: 12/4/2024     CC: Right  elbow pain.    REFERRED BY: Mich Iglesias MD    HPI:   Jace Panda is a very pleasant 49 year old right-hand dominant male who presents today for evaluation, consultation, and management of right elbow injury that occurred after benching 405lbs and the bar fell onto his throat, as his elbow blew out on 12/3/2024.  He was evaluated at the [facility] emergency department in which he was referred to our service for further evaluation management.  He complains of swelling, stiffness, weakness, giving way and instability.  He rates his pain to be a 7 out of 10.  He is very active and weightlifting. Current 1/2ppd smoker.     PMH:   Past Medical History:    Carpal tunnel syndrome on both sides    hands, R hand repaired through surgery, L hand unrepaired    Cellulitis    arm    Elevated blood sugar    Essential hypertension, benign    Low testosterone    Unspecified essential hypertension       PAST SURGICAL HX:  Past Surgical History:   Procedure Laterality Date    Back surgery  10/11    Lumbar area, treated with needle and heat 10/11    Orchiectomy   Left 10/2016    Herminio Vasquez    s/p radiation       Other surgical history      R carpal, Neuroplasty Decompression Median Nerve  At Carpal tunnel     Tonsillectomy      As a child    Vasectomy  10/2016     [facility]       FAMILY HX:  Family History   Problem Relation Age of Onset    Hypertension Father        ALLERGIES:  Ace inhibitors and Vicodin [hydrocodone-acetaminophen]    MEDICATIONS:   Current Outpatient Medications   Medication Sig Dispense Refill    traMADol 50 MG Oral Tab Take 1-2 tablets ( mg total) by mouth every 6 (six) hours as needed for Pain. 15 tablet 0    naproxen 500 MG Oral Tab Take 1 tablet (500 mg  total) by mouth 2 (two) times daily as needed. 20 tablet 0    predniSONE 20 MG Oral Tab 40 milligrams for 3 days then reduce to 30 mg for 3 days then 20 mg for 3 days 15 tablet 0    Naloxone HCl 4 MG/0.1ML Nasal Liquid 4 mg by Intranasal route as needed (May repeat as needed in other nostril if symptoms persist). If patient remains unresponsive, repeat dose in other nostril 2-5 minutes after first dose. 1 kit 0    Tirzepatide (MOUNJARO) 5 MG/0.5ML Subcutaneous Solution Auto-injector Inject 5 mg into the skin once a week. 2 mL 1    hydroCHLOROthiazide 25 MG Oral Tab Take 1 tablet (25 mg total) by mouth daily.      PANTOPRAZOLE 40 MG Oral Tab EC TAKE 1 TABLET BY MOUTH BEFORE BREAKFAST 90 tablet 0    zolpidem 10 MG Oral Tab Take 1 tablet (10 mg total) by mouth nightly. 30 tablet 0    CANDESARTAN 32 MG Oral Tab TAKE 1 TABLET BY MOUTH DAILY. 90 tablet 0    Tirzepatide (MOUNJARO) 2.5 MG/0.5ML Subcutaneous Solution Pen-injector Inject 2.5 mg into the skin once a week. 6 mL 0    amLODIPine 5 MG Oral Tab Take 1 tablet (5 mg total) by mouth daily. 90 tablet 0    Testosterone cypionate (DEPOTESTOTERONE) 200 MG/ML Intramuscular Solution Inject 0.5 mL (100 mg total) into the muscle See Admin Instructions. Every 10 days.         ROS: A complete 10 point review of systems performed and negative aside from the aforementioned per history of present illness.     SOCIAL HX:  Social History     Occupational History    Not on file   Tobacco Use    Smoking status: Every Day     Current packs/day: 1.00     Average packs/day: 1 pack/day for 1 year (1.0 ttl pk-yrs)     Types: Cigarettes    Smokeless tobacco: Never    Tobacco comments:     started again 2020, previously Quit 12/2009 1 1/2 ppd since 1991. Unspecified tabacco product   Vaping Use    Vaping status: Never Used   Substance and Sexual Activity    Alcohol use: Not Currently     Comment: social    Drug use: Yes     Frequency: 1.0 times per week     Types: Cannabis    Sexual  activity: Not on file         PE:   Vitals:    12/04/24 1124   Weight: 250 lb (113.4 kg)   Height: 5' 10\" (1.778 m)     Estimated body mass index is 35.87 kg/m² as calculated from the following:    Height as of this encounter: 5' 10\" (1.778 m).    Weight as of this encounter: 250 lb (113.4 kg).    Physical Exam  Constitutional:       Appearance: Normal appearance.   HENT:      Head: Normocephalic and atraumatic.   Eyes:      Extraocular Movements: Extraocular movements intact.   Neck:      Musculoskeletal: Normal range of motion and neck supple.   Cardiovascular:      Pulses: Normal pulses.   Pulmonary:      Effort: Pulmonary effort is normal. No respiratory distress.   Abdominal:      General: There is no distension.   Skin:     General: Skin is warm.      Capillary Refill: Capillary refill takes less than 2 seconds.      Findings: No bruising.   Neurological:      General: No focal deficit present.      Mental Status: Alert.   Psychiatric:         Mood and Affect: Mood normal.     Examination of the right elbow  demonstrates:     Skin is intact, warm and dry.     Deformity:   none  Atrophy:   none      Palpation:     Medial Epicondyle Negative  Lateral Epicondyle Negative  Olecranon   Negative  +triceps tendon palpable gap     ROM:   Flexion   full and symmetric  Extension  full and symmetric  Pronation  full and symmetric    Strength:   Supraspinatus:   5/5  Subscapularis:   5/5  Infraspinatus/Teres: 5/5  4/5 triceps     Provocative Tests:   Moving Valgus Stress Test:  Negative  Biceps Hook Test:   Negative  Tinel's overlying Ulnar Nerve Negative    Neurovascular Upper Extremity (Bilateral)  Motor:    5/5 EPL, Finger Abduction, , Pinch, Deltoid  Sensation:   intact to light touch median, ulnar, radial and axillary nerve  Circulation:   Normal, 2+ radial pulse    The contralateral upper extremity is without limitation in range of motion or strength, no positive provocative maneuvers.     Radiographic  Examination/Diagnostics:    I personally viewed, independently interpreted and radiology report was reviewed.    CTA CAROTID ARTERIES (CPT=70498)    Result Date: 12/3/2024  PROCEDURE:  CTA CAROTID ARTERIES (CPT=70498)  COMPARISON:  None.  INDICATIONS:  405 barbell dropped on throat, right elbow blew  TECHNIQUE:  Contrast-enhanced multislice CT angiography of the neck vasculature from AP window to Sella was performed using nonionic contrast. 3D volume rendering images were obtained by the technologist as well as the radiologist on an independent workstation. Multiplanar 3D reformatted imaging including multiplanar MIP images were obtained. Curved planar reformats were performed through the carotid and vertebral arteries. All measurements obtained in this exam were performed using NASCET criteria. Dose reduction techniques were used. Dose information is transmitted to the ACR (American College of Radiology) NRDR (National Radiology Data Registry) which includes the Dose Index Registry.   PATIENT STATED HISTORY:(As transcribed by Technologist)  405 barbell dropped on throat.   CONTRAST USED:  75cc of Isovue 370  FINDINGS:  AORTIC ARCH:                       Unremarkable INNOMINATE ARTERY:                       Unremarkable  RIGHT SUBCLAVIAN ARTERY:    No hemodynamically significant stenosis or dissection. COMMON CAROTID:    No hemodynamically significant stenosis or dissection. CAROTID BULB:    No hemodynamically significant stenosis or dissection. INTERNAL CAROTID:    No hemodynamically significant stenosis or dissection. EXTERNAL CAROTID:    No hemodynamically significant stenosis or dissection. VERTEBRAL:    No hemodynamically significant stenosis or dissection.  LEFT SUBCLAVIAN ARTERY:    No hemodynamically significant stenosis or dissection. COMMON CAROTID:    No hemodynamically significant stenosis or dissection. CAROTID BULB:                 No hemodynamically significant stenosis or dissection. INTERNAL  CAROTID:   No hemodynamically significant stenosis or dissection. EXTERNAL CAROTID:   No hemodynamically significant stenosis or dissection. VERTEBRAL:   No hemodynamically significant stenosis or dissection.  BONES:    No bony lesion or fracture.  LUNG APICES:    No visible pulmonary or pleural disease.  OTHER:  The visualized soft tissues of the neck are also unremarkable.             CONCLUSION:  No acute abnormality on CT angiography of the neck.   LOCATION:  [facility]   Dictated by (CST): Scottie Rocha MD on 12/03/2024 at 5:23 PM     Finalized by (CST): Scottie Rocha MD on 12/03/2024 at 5:28 PM       XR ELBOW, COMPLETE (MIN 3 VIEWS), RIGHT (CPT=73080)    Result Date: 12/3/2024  PROCEDURE:  XR ELBOW, COMPLETE (MIN 3 VIEWS), RIGHT (CPT=73080)  TECHNIQUE:  Three views were obtained.  COMPARISON:  None.  INDICATIONS:  405 barbell dropped on throat, right elbow blew  PATIENT STATED HISTORY: (As transcribed by Technologist)   barbell dropped on throat, right elbow blew.    FINDINGS:  No acute fractures or osseous lesions are identified.  Radial capitellar relationship is within normal limits.  No significant elbow joint effusion.  Well corticated ossific density adjacent to the lateral epicondyle.  Soft tissue swelling.            CONCLUSION:  No acute fractures.  Mild soft tissue swelling along the dorsal aspect of the elbow.   LOCATION:  [id number]    Dictated by (CST): Elzbieta Villarreal MD on 12/03/2024 at 3:56 PM     Finalized by (CST): Elzbieta Villarreal MD on 12/03/2024 at 3:57 PM         IMPRESSION: Jcae Panda is a 49 year old Right hand dominant male with right elbow clinical evidence of triceps rupture.     PLAN:   We had a detailed discussion outlining the etiology, anatomy, pathophysiology, and natural history of the patient's findings. Imaging was reviewed in detail and correlated to a 3-dimensional model of the patient's pathology.     We reviewed the treatment of this disease condition.  In light of the  acute traumatic incident, loss of normal function, and  failure to progress conservatively we recommend an MRI to evaluate the integrity of the patient's right triceps tendon. The patient will follow up after imaging.   Differential diagnosis includes but not limited to: cartilage injury/loose body, meniscus tear/injury, ACL tear, bone marrow edema, and osteoarthritis.     External records were also reviewed for pertinent historical findings contributing to the patients undiagnosed new problem with uncertain prognosis.     The patient had the opportunity to ask questions, and all questions were answered appropriately.           FOLLOW-UP:   Return to clinic following completion of MRI to review scan and findings.           Tico Whitaker [facility], PA-C Orthopedic Surgery / Sports Medicine Specialist  [facility]  [address] [place] [address] [id number] [place], [place] [email or web address]  [email or web address]  t: [phone]  o: [phone]  f: [phone]    This note was dictated using Dragon software.  While it was briefly proofread prior to completion, some grammatical, spelling, and word choice errors due to dictation may still occur.

## 2024-12-04 NOTE — H&P
[facility] - ORTHOPEDICS  [address] [place] [id number]  [phone]     NEW PATIENT VISIT - HISTORY AND PHYSICAL EXAMINATION     Name: Jace Panda   MRN: [id number]  Date: 12/4/2024     CC: Right  elbow pain.    REFERRED BY: Mich Iglesias MD    HPI:   Jace Panda is a very pleasant 49 year old right-hand dominant male who presents today for evaluation, consultation, and management of right elbow injury that occurred after benching 405lbs and the bar fell onto his throat, as his elbow blew out on 12/3/2024.  He was evaluated at the [facility] emergency department in which he was referred to our service for further evaluation management.  He complains of swelling, stiffness, weakness, giving way and instability.  He rates his pain to be a 7 out of 10.  He is very active and weightlifting. Current 1/2ppd smoker.     PMH:   Past Medical History:    Carpal tunnel syndrome on both sides    hands, R hand repaired through surgery, L hand unrepaired    Cellulitis    arm    Elevated blood sugar    Essential hypertension, benign    Low testosterone    Unspecified essential hypertension       PAST SURGICAL HX:  Past Surgical History:   Procedure Laterality Date    Back surgery  10/11    Lumbar area, treated with needle and heat 10/11    Orchiectomy   Left 10/2016    Herminio Vasquez    s/p radiation       Other surgical history      R carpal, Neuroplasty Decompression Median Nerve  At Carpal tunnel     Tonsillectomy      As a child    Vasectomy  10/2016     [facility]       FAMILY HX:  Family History   Problem Relation Age of Onset    Hypertension Father        ALLERGIES:  Ace inhibitors and Vicodin [hydrocodone-acetaminophen]    MEDICATIONS:   Current Outpatient Medications   Medication Sig Dispense Refill    traMADol 50 MG Oral Tab Take 1-2 tablets ( mg total) by mouth every 6 (six) hours as needed for Pain. 15 tablet 0    naproxen 500 MG Oral Tab Take 1 tablet (500 mg  total) by mouth 2 (two) times daily as needed. 20 tablet 0    predniSONE 20 MG Oral Tab 40 milligrams for 3 days then reduce to 30 mg for 3 days then 20 mg for 3 days 15 tablet 0    Naloxone HCl 4 MG/0.1ML Nasal Liquid 4 mg by Intranasal route as needed (May repeat as needed in other nostril if symptoms persist). If patient remains unresponsive, repeat dose in other nostril 2-5 minutes after first dose. 1 kit 0    Tirzepatide (MOUNJARO) 5 MG/0.5ML Subcutaneous Solution Auto-injector Inject 5 mg into the skin once a week. 2 mL 1    hydroCHLOROthiazide 25 MG Oral Tab Take 1 tablet (25 mg total) by mouth daily.      PANTOPRAZOLE 40 MG Oral Tab EC TAKE 1 TABLET BY MOUTH BEFORE BREAKFAST 90 tablet 0    zolpidem 10 MG Oral Tab Take 1 tablet (10 mg total) by mouth nightly. 30 tablet 0    CANDESARTAN 32 MG Oral Tab TAKE 1 TABLET BY MOUTH DAILY. 90 tablet 0    Tirzepatide (MOUNJARO) 2.5 MG/0.5ML Subcutaneous Solution Pen-injector Inject 2.5 mg into the skin once a week. 6 mL 0    amLODIPine 5 MG Oral Tab Take 1 tablet (5 mg total) by mouth daily. 90 tablet 0    Testosterone cypionate (DEPOTESTOTERONE) 200 MG/ML Intramuscular Solution Inject 0.5 mL (100 mg total) into the muscle See Admin Instructions. Every 10 days.         ROS: A complete 10 point review of systems performed and negative aside from the aforementioned per history of present illness.     SOCIAL HX:  Social History     Occupational History    Not on file   Tobacco Use    Smoking status: Every Day     Current packs/day: 1.00     Average packs/day: 1 pack/day for 1 year (1.0 ttl pk-yrs)     Types: Cigarettes    Smokeless tobacco: Never    Tobacco comments:     started again 2020, previously Quit 12/2009 1 1/2 ppd since 1991. Unspecified tabacco product   Vaping Use    Vaping status: Never Used   Substance and Sexual Activity    Alcohol use: Not Currently     Comment: social    Drug use: Yes     Frequency: 1.0 times per week     Types: Cannabis    Sexual  activity: Not on file         PE:   Vitals:    12/04/24 1124   Weight: 250 lb (113.4 kg)   Height: 5' 10\" (1.778 m)     Estimated body mass index is 35.87 kg/m² as calculated from the following:    Height as of this encounter: 5' 10\" (1.778 m).    Weight as of this encounter: 250 lb (113.4 kg).    Physical Exam  Constitutional:       Appearance: Normal appearance.   HENT:      Head: Normocephalic and atraumatic.   Eyes:      Extraocular Movements: Extraocular movements intact.   Neck:      Musculoskeletal: Normal range of motion and neck supple.   Cardiovascular:      Pulses: Normal pulses.   Pulmonary:      Effort: Pulmonary effort is normal. No respiratory distress.   Abdominal:      General: There is no distension.   Skin:     General: Skin is warm.      Capillary Refill: Capillary refill takes less than 2 seconds.      Findings: No bruising.   Neurological:      General: No focal deficit present.      Mental Status: Alert.   Psychiatric:         Mood and Affect: Mood normal.     Examination of the right elbow  demonstrates:     Skin is intact, warm and dry.     Deformity:   none  Atrophy:   none      Palpation:     Medial Epicondyle Negative  Lateral Epicondyle Negative  Olecranon   Negative  +triceps tendon palpable gap     ROM:   Flexion   full and symmetric  Extension  full and symmetric  Pronation  full and symmetric    Strength:   Supraspinatus:   5/5  Subscapularis:   5/5  Infraspinatus/Teres: 5/5  4/5 triceps     Provocative Tests:   Moving Valgus Stress Test:  Negative  Biceps Hook Test:   Negative  Tinel's overlying Ulnar Nerve Negative    Neurovascular Upper Extremity (Bilateral)  Motor:    5/5 EPL, Finger Abduction, , Pinch, Deltoid  Sensation:   intact to light touch median, ulnar, radial and axillary nerve  Circulation:   Normal, 2+ radial pulse    The contralateral upper extremity is without limitation in range of motion or strength, no positive provocative maneuvers.     Radiographic  Examination/Diagnostics:    I personally viewed, independently interpreted and radiology report was reviewed.    CTA CAROTID ARTERIES (CPT=70498)    Result Date: 12/3/2024  PROCEDURE:  CTA CAROTID ARTERIES (CPT=70498)  COMPARISON:  None.  INDICATIONS:  405 barbell dropped on throat, right elbow blew  TECHNIQUE:  Contrast-enhanced multislice CT angiography of the neck vasculature from AP window to Sella was performed using nonionic contrast. 3D volume rendering images were obtained by the technologist as well as the radiologist on an independent workstation. Multiplanar 3D reformatted imaging including multiplanar MIP images were obtained. Curved planar reformats were performed through the carotid and vertebral arteries. All measurements obtained in this exam were performed using NASCET criteria. Dose reduction techniques were used. Dose information is transmitted to the ACR (American College of Radiology) NRDR (National Radiology Data Registry) which includes the Dose Index Registry.   PATIENT STATED HISTORY:(As transcribed by Technologist)  405 barbell dropped on throat.   CONTRAST USED:  75cc of Isovue 370  FINDINGS:  AORTIC ARCH:                       Unremarkable INNOMINATE ARTERY:                       Unremarkable  RIGHT SUBCLAVIAN ARTERY:    No hemodynamically significant stenosis or dissection. COMMON CAROTID:    No hemodynamically significant stenosis or dissection. CAROTID BULB:    No hemodynamically significant stenosis or dissection. INTERNAL CAROTID:    No hemodynamically significant stenosis or dissection. EXTERNAL CAROTID:    No hemodynamically significant stenosis or dissection. VERTEBRAL:    No hemodynamically significant stenosis or dissection.  LEFT SUBCLAVIAN ARTERY:    No hemodynamically significant stenosis or dissection. COMMON CAROTID:    No hemodynamically significant stenosis or dissection. CAROTID BULB:                 No hemodynamically significant stenosis or dissection. INTERNAL  CAROTID:   No hemodynamically significant stenosis or dissection. EXTERNAL CAROTID:   No hemodynamically significant stenosis or dissection. VERTEBRAL:   No hemodynamically significant stenosis or dissection.  BONES:    No bony lesion or fracture.  LUNG APICES:    No visible pulmonary or pleural disease.  OTHER:  The visualized soft tissues of the neck are also unremarkable.             CONCLUSION:  No acute abnormality on CT angiography of the neck.   LOCATION:  [facility]   Dictated by (CST): Scottie Rocha MD on 12/03/2024 at 5:23 PM     Finalized by (CST): Scottie Rocha MD on 12/03/2024 at 5:28 PM       XR ELBOW, COMPLETE (MIN 3 VIEWS), RIGHT (CPT=73080)    Result Date: 12/3/2024  PROCEDURE:  XR ELBOW, COMPLETE (MIN 3 VIEWS), RIGHT (CPT=73080)  TECHNIQUE:  Three views were obtained.  COMPARISON:  None.  INDICATIONS:  405 barbell dropped on throat, right elbow blew  PATIENT STATED HISTORY: (As transcribed by Technologist)   barbell dropped on throat, right elbow blew.    FINDINGS:  No acute fractures or osseous lesions are identified.  Radial capitellar relationship is within normal limits.  No significant elbow joint effusion.  Well corticated ossific density adjacent to the lateral epicondyle.  Soft tissue swelling.            CONCLUSION:  No acute fractures.  Mild soft tissue swelling along the dorsal aspect of the elbow.   LOCATION:  [id number]    Dictated by (CST): Elzbieta Villarreal MD on 12/03/2024 at 3:56 PM     Finalized by (CST): Elzbieta Villarreal MD on 12/03/2024 at 3:57 PM         IMPRESSION: Jace Panda is a 49 year old Right hand dominant male with right elbow clinical evidence of triceps rupture.     PLAN:   We had a detailed discussion outlining the etiology, anatomy, pathophysiology, and natural history of the patient's findings. Imaging was reviewed in detail and correlated to a 3-dimensional model of the patient's pathology.     We reviewed the treatment of this disease condition.  In light of the  acute traumatic incident, loss of normal function, and  failure to progress conservatively we recommend an MRI to evaluate the integrity of the patient's right triceps tendon. The patient will follow up after imaging.   Differential diagnosis includes but not limited to: cartilage injury/loose body, meniscus tear/injury, ACL tear, bone marrow edema, and osteoarthritis.     External records were also reviewed for pertinent historical findings contributing to the patients undiagnosed new problem with uncertain prognosis.     The patient had the opportunity to ask questions, and all questions were answered appropriately.           FOLLOW-UP:   Return to clinic following completion of MRI to review scan and findings.           Tico Whitaker [facility], PA-C Orthopedic Surgery / Sports Medicine Specialist  [facility]  [address] [place] [address] [id number] [place], [place] [email or web address]  [email or web address]  t: [phone]  o: [phone]  f: [phone]    This note was dictated using Dragon software.  While it was briefly proofread prior to completion, some grammatical, spelling, and word choice errors due to dictation may still occur.

## 2024-12-04 NOTE — PROCEDURES
Procedure: Flexible laryngoscopy    Anesthesia: None    Findings: The procedure was described in detail to the patient. The risks benefits, and alternatives were thoroughly discussed and the patient wished to proceed. In a separately identifiable procedure, flexible laryngoscopy was performed. After topical anesthesia and decongestion, a 4.0 mm flexible laryngoscope was passed.     The following abnormal findings were noted:   There were no masses or lesions in the nasopharynx or the oropharynx. Palatal closure was complete and symmetric. Bilateral base of tongue was visualized and without masses. The epiglottis had trace edema on the lingual surface. On examination of the arytenoids, there was a small amount of mucosal swelling of the bilateral arytenoids, which does not extend inferiorly into the false or true vocal folds. Vocal cords are mobile and symmetric bilaterally. Vocal folds are crisp and without edema. The immediate subglottic region was visualized and was without masses or lesions. Post-cricoid area had no erythema or edema. Minimal thin secretions throughout the upper aerodigestive tract. The scope was withdrawn and the patient tolerated the procedure well.

## 2024-12-04 NOTE — TELEPHONE ENCOUNTER
New pt appt torn rt tricep imaging in epic   Future Appointments  12/4/2024  11:20 AM   Tico Whitaker PA        [id number]  2/3/2025   3:15 PM    Mich Iglesias MD          [facility]               [facility] [place]

## 2024-12-05 NOTE — TELEPHONE ENCOUNTER
Spoke to pt who states he injured his arm 2 days ago and will need surgery next week. Pt also states his arm will need to be immobilized for at least 2 weeks following surgery, therefore he does not know when he will be able to move forward with the epidural. Advised pt that he should discuss the steroid injection w/ his surgeon at his first post-op f/u and ask when the surgeon feels he might be able to move forward. Advised pt he will need to be able to lie flat on his stomach and his incision will need to be healing without issue. Encouraged pt to call back when he is able to schedule. Pt HANG.

## 2024-12-06 ENCOUNTER — TELEPHONE (OUTPATIENT)
Dept: ORTHOPEDICS CLINIC | Facility: CLINIC | Age: 49
End: 2024-12-06

## 2024-12-06 ENCOUNTER — OFFICE VISIT (OUTPATIENT)
Dept: ORTHOPEDICS CLINIC | Facility: CLINIC | Age: 49
End: 2024-12-06
Payer: COMMERCIAL

## 2024-12-06 DIAGNOSIS — S46.311A RUPTURE OF RIGHT TRICEPS TENDON, INITIAL ENCOUNTER: Primary | ICD-10-CM

## 2024-12-06 PROCEDURE — 99213 OFFICE O/P EST LOW 20 MIN: CPT | Performed by: PHYSICIAN ASSISTANT

## 2024-12-06 NOTE — PROGRESS NOTES
[facility] - ORTHOPEDICS  [address] [place] [id number]  [phone]       Name: Jace Panda   MRN: [id number]  Date: 12/6/2024     REASON FOR VISIT: Follow up for right elbow injury.     INTERVAL HISTORY:  Jace Panda is a 49 year old male who returns for evaluation of right elbow injury.     To summarize,  right elbow injury that occurred after benching 405lbs and the bar fell onto his throat, as his elbow blew out on 12/3/2024.  He was evaluated at the [facility] emergency department in which he was referred to our service for further evaluation management.  He complains of swelling, stiffness, weakness, giving way and instability.  He rates his pain to be a 7 out of 10.  He is very active and weightlifting. Current 1/2ppd smoker.      At the patient's last visit we recommended an MRI.    ROS: ROS    PE:   There were no vitals filed for this visit.  Estimated body mass index is 35.87 kg/m² as calculated from the following:    Height as of 12/4/24: 5' 10\" (1.778 m).    Weight as of 12/4/24: 250 lb (113.4 kg).    Physical Exam  Constitutional:       Appearance: Normal appearance.   HENT:      Head: Normocephalic and atraumatic.   Eyes:      Extraocular Movements: Extraocular movements intact.   Neck:      Musculoskeletal: Normal range of motion and neck supple.   Cardiovascular:      Pulses: Normal pulses.   Pulmonary:      Effort: Pulmonary effort is normal. No respiratory distress.   Abdominal:      General: There is no distension.   Skin:     General: Skin is warm.      Capillary Refill: Capillary refill takes less than 2 seconds.      Findings: No bruising.   Neurological:      General: No focal deficit present.      Mental Status: She is alert.   Psychiatric:         Mood and Affect: Mood normal.          Examination of the right elbow  demonstrates:      Skin is intact, warm and dry.      Deformity:                         none  Atrophy:                              none       Palpation:                            Medial Epicondyle           Negative  Lateral Epicondyle           Negative  Olecranon                          Negative  +triceps tendon palpable gap      ROM:   Flexion                                full and symmetric  Extension                           full and symmetric  Pronation                           full and symmetric     Strength:   Supraspinatus:                  5/5  Subscapularis:                   5/5  Infraspinatus/Teres:        5/5  Triceps:   4/5     Provocative Tests:   Moving Valgus Stress Test:           Negative  Biceps Hook Test:                           Negative  Tinel's overlying Ulnar Nerve       Negative     Neurovascular Upper Extremity (Bilateral)  Motor:                                5/5 EPL, Finger Abduction, , Pinch, Deltoid  Sensation:                          intact to light touch median, ulnar, radial and axillary nerve  Circulation:                        Normal, 2+ radial pulse     The contralateral upper extremity is without limitation in range of motion or strength, no positive provocative maneuvers.     Radiographic Examination/Diagnostics:    I personally viewed, independently interpreted and radiology report was reviewed.    MRI ELBOW, RIGHT (GRE=[id number])    Result Date: 12/4/2024  PROCEDURE:  MRI ELBOW, RIGHT (CPT=73221)  COMPARISON:  None.  INDICATIONS:  S46.311A Rupture of right triceps tendon, initial encounter  TECHNIQUE:  Multiplanar imaging of the elbow is acquired including axial, sagittal and coronal imaging as needed. Proton density, fluid sensitive and fat suppression sequences are performed. Additional imaging was performed as needed.  Examination was performed without contrast.   PATIENT STATED HISTORY: (As transcribed by Technologist)   Rupture of right triceps tendon    FINDINGS:  TENDONS EXTENSOR:  Severe tendinopathy with suspected high-grade partial tear of the anterior fibers of the common  extensor tendon origin (series 11, image 21).  FLEXOR:  Suspected high-grade tear of the common flexor tendon origin, supported by adjacent hematoma, additionally with mild/moderate intramuscular edema of the pronator teres and flexor digitorum superficialis OTHER:  Rupture of the distal triceps tendon involving the full width of the distal tendon at the olecranon insertion.  There may be a small associated cortical avulsion injury of the olecranon process (series 12, image 16).  There is a fluid signal gap at the site of tear measuring 3 mm (series 11, image 27).  Complex collection extending into the adjacent ulnar soft tissues most in keeping with hematoma measuring approximately 5 x 4 x 4 cm.  There is extensive edema within and surrounding the torn tendon with edematous changes extending into the distal triceps muscle belly involving all heads.  Extensive subcutaneous edema circumferentially about the elbow.  LIGAMENTS RADIAL:  No tear of the radial collateral, lateral ulnar collateral, or annular ligaments.  ULNAR:  No well-defined ligament tear, though the humeral attachment of the ligament is suboptimally visualized secondary to regional edema/hematoma.  MUSCLES:  See above description BONES:  No acute fracture or malalignment.  No significant elbow arthropathy. JOINT CAPSULE:  No synovitis or intra-articular bodies.  CUBITAL TUNNEL:  Normal caliber and signal intensity of the ulnar nerve.  No mass lesions.            CONCLUSION:   1. Rupture of the distal triceps tendon involving the full tendon width at the footplate insertion at the olecranon.  Tendon retraction of approximately 3 mm.  Extensive edema within and adjacent to the torn tendon extends proximally to the distal triceps muscle belly involving all heads.   2. Suspected high-grade tear of the common flexor tendon origin.  This is supported by mild/moderate intramuscular edema involving the pronator teres and flexor digitorum superficialis.   3.  Suspected high-grade tear of the anterior fibers of the common extensor tendon origin.  4. Extensive subcutaneous edema circumferentially about the elbow.    LOCATION:  [facility]   Dictated by (CST): Sandra Cary MD on 12/04/2024 at 3:26 PM     Finalized by (CST): Sandra Cary MD on 12/04/2024 at 3:44 PM       CTA CAROTID ARTERIES (CPT=70498)    Result Date: 12/3/2024  PROCEDURE:  CTA CAROTID ARTERIES (CPT=70498)  COMPARISON:  None.  INDICATIONS:  405 barbell dropped on throat, right elbow blew  TECHNIQUE:  Contrast-enhanced multislice CT angiography of the neck vasculature from AP window to Sella was performed using nonionic contrast. 3D volume rendering images were obtained by the technologist as well as the radiologist on an independent workstation. Multiplanar 3D reformatted imaging including multiplanar MIP images were obtained. Curved planar reformats were performed through the carotid and vertebral arteries. All measurements obtained in this exam were performed using NASCET criteria. Dose reduction techniques were used. Dose information is transmitted to the ACR (American College of Radiology) NRDR (National Radiology Data Registry) which includes the Dose Index Registry.   PATIENT STATED HISTORY:(As transcribed by Technologist)  405 barbell dropped on throat.   CONTRAST USED:  75cc of Isovue 370  FINDINGS:  AORTIC ARCH:                       Unremarkable INNOMINATE ARTERY:                       Unremarkable  RIGHT SUBCLAVIAN ARTERY:    No hemodynamically significant stenosis or dissection. COMMON CAROTID:    No hemodynamically significant stenosis or dissection. CAROTID BULB:    No hemodynamically significant stenosis or dissection. INTERNAL CAROTID:    No hemodynamically significant stenosis or dissection. EXTERNAL CAROTID:    No hemodynamically significant stenosis or dissection. VERTEBRAL:    No hemodynamically significant stenosis or dissection.  LEFT SUBCLAVIAN ARTERY:    No hemodynamically significant  stenosis or dissection. COMMON CAROTID:    No hemodynamically significant stenosis or dissection. CAROTID BULB:                 No hemodynamically significant stenosis or dissection. INTERNAL CAROTID:   No hemodynamically significant stenosis or dissection. EXTERNAL CAROTID:   No hemodynamically significant stenosis or dissection. VERTEBRAL:   No hemodynamically significant stenosis or dissection.  BONES:    No bony lesion or fracture.  LUNG APICES:    No visible pulmonary or pleural disease.  OTHER:  The visualized soft tissues of the neck are also unremarkable.             CONCLUSION:  No acute abnormality on CT angiography of the neck.   LOCATION:  [facility]   Dictated by (CST): Scottie Rocha MD on 12/03/2024 at 5:23 PM     Finalized by (CST): Scottie Rocha MD on 12/03/2024 at 5:28 PM       XR ELBOW, COMPLETE (MIN 3 VIEWS), RIGHT (CPT=73080)    Result Date: 12/3/2024  PROCEDURE:  XR ELBOW, COMPLETE (MIN 3 VIEWS), RIGHT (CPT=73080)  TECHNIQUE:  Three views were obtained.  COMPARISON:  None.  INDICATIONS:  405 barbell dropped on throat, right elbow blew  PATIENT STATED HISTORY: (As transcribed by Technologist)   barbell dropped on throat, right elbow blew.    FINDINGS:  No acute fractures or osseous lesions are identified.  Radial capitellar relationship is within normal limits.  No significant elbow joint effusion.  Well corticated ossific density adjacent to the lateral epicondyle.  Soft tissue swelling.            CONCLUSION:  No acute fractures.  Mild soft tissue swelling along the dorsal aspect of the elbow.   LOCATION:  [id number]    Dictated by (CST): Elzbieta Villarreal MD on 12/03/2024 at 3:56 PM     Finalized by (CST): Elzbieta Villarreal MD on 12/03/2024 at 3:57 PM         IMPRESSION: Jace Panda is a 49 year old male who presented for follow up of right complete triceps tendon rupture.     PLAN:   We had a detailed discussion outlining the etiology, anatomy, pathophysiology, and natural history of the patient's  findings.    We reviewed the treatment of this disease condition. Given his functional goals, clinical presentation and MRI findings - I recommend surgical intervention to return him to his baseline goals and function.   I recommend surgical consultation with my colleague Dr. Fam Dorman for right elbow triceps tendon repair. Case discussed with Dr. Dorman today.       FOLLOW-UP:  Dr. Dorman's team will follow up.             Sincer QUITA Whitaker [facility], PA-C Orthopedic Surgery / Sports Medicine Specialist  [facility]  [address] [place] [address].org  [email or web address]  t: [phone]  o: [phone]  f: [phone]    This note was dictated using Dragon software.  While it was briefly proofread prior to completion, some grammatical, spelling, and word choice errors due to dictation may still occur.

## 2024-12-06 NOTE — TELEPHONE ENCOUNTER
Xray completed on 12/3/24. MRI completed 12/4.   Will be assessed at 12/9/24 appointment and if any further imaging needed can be addressed at that time.

## 2024-12-06 NOTE — TELEPHONE ENCOUNTER
Patient is scheduled for right elbow, referral from Tico Whitaker. X-ray and MRI in Epic. Please advise if additional imaging is needed.  Future Appointments   Date Time Provider Department Center   12/9/2024 10:30 AM Fam Dorman MD [facility]   2/3/2025  3:15 PM Mich Iglesias MD [facility]

## 2024-12-09 ENCOUNTER — OFFICE VISIT (OUTPATIENT)
Dept: ORTHOPEDICS CLINIC | Facility: CLINIC | Age: 49
End: 2024-12-09
Payer: COMMERCIAL

## 2024-12-09 ENCOUNTER — TELEPHONE (OUTPATIENT)
Dept: ORTHOPEDICS CLINIC | Facility: CLINIC | Age: 49
End: 2024-12-09

## 2024-12-09 DIAGNOSIS — S46.311A TRICEPS TENDON RUPTURE, RIGHT, INITIAL ENCOUNTER: Primary | ICD-10-CM

## 2024-12-09 NOTE — PROGRESS NOTES
[employer] - ORTHOPEDICS  [address]  [address]  [phone]     NEW PATIENT VISIT - HISTORY AND PHYSICAL EXAMINATION     Name: Jace Panda   MRN: [id number]  Date: 12/9/2024     CC: Left elbow pain    REFERRED BY: Mich Iglesias MD    HPI:   Jace Panda is a very pleasant 49 year old right-hand dominant male who presents today for evaluation, consultation, and management of left elbow pain.  The patient was bench pressing 405 pounds December 3, 2024 when he sustained an injury.  He felt an immediate pop in his left elbow and the benchpress barbell fell directly on his chest and his neck.  He went to seek medical care and further testing was evaluated demonstrating a left torn triceps tendon.  All 3 tendons and the conjoined tendon were disrupted.  Since that time he had severe pain and ecchymosis's and has been in a sling.    PMH:   Past Medical History:    Carpal tunnel syndrome on both sides    hands, R hand repaired through surgery, L hand unrepaired    Cellulitis    arm    Elevated blood sugar    Essential hypertension, benign    Low testosterone    Unspecified essential hypertension       PAST SURGICAL HX:  Past Surgical History:   Procedure Laterality Date    Back surgery  10/11    Lumbar area, treated with needle and heat 10/11    Orchiectomy   Left 10/2016    [facility]    s/p radiation       Other surgical history      R carpal, Neuroplasty Decompression Median Nerve  At Carpal tunnel     Tonsillectomy      As a child    Vasectomy  10/2016     [facility]       FAMILY HX:  Family History   Problem Relation Age of Onset    Hypertension Father        ALLERGIES:  Ace inhibitors and Vicodin [hydrocodone-acetaminophen]    MEDICATIONS:   Current Outpatient Medications   Medication Sig Dispense Refill    naproxen 500 MG Oral Tab Take 1 tablet (500 mg total) by mouth 2 (two) times daily as needed. 20 tablet 0    predniSONE 20 MG Oral Tab 40 milligrams for 3 days then reduce to 30 mg for 3 days  then 20 mg for 3 days 15 tablet 0    Naloxone HCl 4 MG/0.1ML Nasal Liquid 4 mg by Intranasal route as needed (May repeat as needed in other nostril if symptoms persist). If patient remains unresponsive, repeat dose in other nostril 2-5 minutes after first dose. 1 kit 0    Tirzepatide (MOUNJARO) 5 MG/0.5ML Subcutaneous Solution Auto-injector Inject 5 mg into the skin once a week. 2 mL 1    hydroCHLOROthiazide 25 MG Oral Tab Take 1 tablet (25 mg total) by mouth daily.      PANTOPRAZOLE 40 MG Oral Tab EC TAKE 1 TABLET BY MOUTH BEFORE BREAKFAST 90 tablet 0    zolpidem 10 MG Oral Tab Take 1 tablet (10 mg total) by mouth nightly. 30 tablet 0    CANDESARTAN 32 MG Oral Tab TAKE 1 TABLET BY MOUTH DAILY. 90 tablet 0    Tirzepatide (MOUNJARO) 2.5 MG/0.5ML Subcutaneous Solution Pen-injector Inject 2.5 mg into the skin once a week. 6 mL 0    amLODIPine 5 MG Oral Tab Take 1 tablet (5 mg total) by mouth daily. 90 tablet 0    Testosterone cypionate (DEPOTESTOTERONE) 200 MG/ML Intramuscular Solution Inject 0.5 mL (100 mg total) into the muscle See Admin Instructions. Every 10 days.         ROS: A comprehensive 14 point review of systems was performed and was negative aside from the aforementioned per history of present illness.    SOCIAL HX:  Social History     Occupational History    Not on file   Tobacco Use    Smoking status: Every Day     Current packs/day: 1.00     Average packs/day: 1 pack/day for 1 year (1.0 ttl pk-yrs)     Types: Cigarettes    Smokeless tobacco: Never    Tobacco comments:     started again 2020, previously Quit 12/2009 1 1/2 ppd since 1991. Unspecified tabacco product   Vaping Use    Vaping status: Never Used   Substance and Sexual Activity    Alcohol use: Not Currently     Comment: social    Drug use: Yes     Frequency: 1.0 times per week     Types: Cannabis    Sexual activity: Not on file        PE:   There were no vitals filed for this visit.  Estimated body mass index is 35.87 kg/m² as calculated from  the following:    Height as of 12/4/24: 5' 10\" (1.778 m).    Weight as of 12/4/24: 250 lb (113.4 kg).    Physical Exam  Constitutional:       Appearance: Normal appearance.   HENT:      Head: Normocephalic and atraumatic.   Eyes:      Extraocular Movements: Extraocular movements intact.   Neck:      Musculoskeletal: Normal range of motion and neck supple.   Cardiovascular:      Pulses: Normal pulses.   Pulmonary:      Effort: Pulmonary effort is normal. No respiratory distress.   Abdominal:      General: There is no distension.   Skin:     General: Skin is warm.      Capillary Refill: Capillary refill takes less than 2 seconds.      Findings: No bruising.   Neurological:      General: No focal deficit present.      Mental Status: Alert.   Psychiatric:         Mood and Affect: Mood normal.     Nation of the left elbow demonstrates  Severe ecchymosis on the lateral posterior and medial aspect of the elbow  0 out of 5 strength and elbow extension 5 out of 5 strength with respect to elbow flexion.  Tender to palpation along the olecranon.  Neurovascular Upper Extremity (Bilateral)  Motor:    5/5 EPL, Finger Abduction, , Pinch, Deltoid  Sensation:   intact to light touch median, ulnar, radial and axillary nerve  Circulation:   Normal, 2+ radial pulse    The contralateral upper extremity is without limitation in range of motion or strength, no positive provocative maneuvers.     Radiographic Examination/Diagnostics:    I personally viewed, independently interpreted and radiology report was reviewed.      MRI ELBOW, RIGHT (TLZ=45158)    Result Date: 12/4/2024  PROCEDURE:  MRI ELBOW, RIGHT (CPT=73221)  COMPARISON:  None.  INDICATIONS:  S46.311A Rupture of right triceps tendon, initial encounter  TECHNIQUE:  Multiplanar imaging of the elbow is acquired including axial, sagittal and coronal imaging as needed. Proton density, fluid sensitive and fat suppression sequences are performed. Additional imaging was performed as  needed.  Examination was performed without contrast.   PATIENT STATED HISTORY: (As transcribed by Technologist)   Rupture of right triceps tendon    FINDINGS:  TENDONS EXTENSOR:  Severe tendinopathy with suspected high-grade partial tear of the anterior fibers of the common extensor tendon origin (series 11, image 21).  FLEXOR:  Suspected high-grade tear of the common flexor tendon origin, supported by adjacent hematoma, additionally with mild/moderate intramuscular edema of the pronator teres and flexor digitorum superficialis OTHER:  Rupture of the distal triceps tendon involving the full width of the distal tendon at the olecranon insertion.  There may be a small associated cortical avulsion injury of the olecranon process (series 12, image 16).  There is a fluid signal gap at the site of tear measuring 3 mm (series 11, image 27).  Complex collection extending into the adjacent ulnar soft tissues most in keeping with hematoma measuring approximately 5 x 4 x 4 cm.  There is extensive edema within and surrounding the torn tendon with edematous changes extending into the distal triceps muscle belly involving all heads.  Extensive subcutaneous edema circumferentially about the elbow.  LIGAMENTS RADIAL:  No tear of the radial collateral, lateral ulnar collateral, or annular ligaments.  ULNAR:  No well-defined ligament tear, though the humeral attachment of the ligament is suboptimally visualized secondary to regional edema/hematoma.  MUSCLES:  See above description BONES:  No acute fracture or malalignment.  No significant elbow arthropathy. JOINT CAPSULE:  No synovitis or intra-articular bodies.  CUBITAL TUNNEL:  Normal caliber and signal intensity of the ulnar nerve.  No mass lesions.            CONCLUSION:   1. Rupture of the distal triceps tendon involving the full tendon width at the footplate insertion at the olecranon.  Tendon retraction of approximately 3 mm.  Extensive edema within and adjacent to the torn  tendon extends proximally to the distal triceps muscle belly involving all heads.   2. Suspected high-grade tear of the common flexor tendon origin.  This is supported by mild/moderate intramuscular edema involving the pronator teres and flexor digitorum superficialis.   3. Suspected high-grade tear of the anterior fibers of the common extensor tendon origin.  4. Extensive subcutaneous edema circumferentially about the elbow.    LOCATION:  [facility]   Dictated by (CST): Sandra Cary MD on 12/04/2024 at 3:26 PM     Finalized by (CST): Sandra Cary MD on 12/04/2024 at 3:44 PM       CTA CAROTID ARTERIES (CPT=70498)    Result Date: 12/3/2024  PROCEDURE:  CTA CAROTID ARTERIES (CPT=70498)  COMPARISON:  None.  INDICATIONS:  405 barbell dropped on throat, right elbow blew  TECHNIQUE:  Contrast-enhanced multislice CT angiography of the neck vasculature from AP window to [place] was performed using nonionic contrast. 3D volume rendering images were obtained by the technologist as well as the radiologist on an independent workstation. Multiplanar 3D reformatted imaging including multiplanar MIP images were obtained. Curved planar reformats were performed through the carotid and vertebral arteries. All measurements obtained in this exam were performed using NASCET criteria. Dose reduction techniques were used. Dose information is transmitted to the ACR (American College of Radiology) NRDR (National Radiology Data Registry) which includes the Dose Index Registry.   PATIENT STATED HISTORY:(As transcribed by Technologist)  405 barbell dropped on throat.   CONTRAST USED:  75cc of Isovue 370  FINDINGS:  AORTIC ARCH:                       Unremarkable INNOMINATE ARTERY:                       Unremarkable  RIGHT SUBCLAVIAN ARTERY:    No hemodynamically significant stenosis or dissection. COMMON CAROTID:    No hemodynamically significant stenosis or dissection. CAROTID BULB:    No hemodynamically significant stenosis or dissection. INTERNAL  CAROTID:    No hemodynamically significant stenosis or dissection. EXTERNAL CAROTID:    No hemodynamically significant stenosis or dissection. VERTEBRAL:    No hemodynamically significant stenosis or dissection.  LEFT SUBCLAVIAN ARTERY:    No hemodynamically significant stenosis or dissection. COMMON CAROTID:    No hemodynamically significant stenosis or dissection. CAROTID BULB:                 No hemodynamically significant stenosis or dissection. INTERNAL CAROTID:   No hemodynamically significant stenosis or dissection. EXTERNAL CAROTID:   No hemodynamically significant stenosis or dissection. VERTEBRAL:   No hemodynamically significant stenosis or dissection.  BONES:    No bony lesion or fracture.  LUNG APICES:    No visible pulmonary or pleural disease.  OTHER:  The visualized soft tissues of the neck are also unremarkable.             CONCLUSION:  No acute abnormality on CT angiography of the neck.   LOCATION:  [facility]   Dictated by (CST): Scottie Rocha MD on 12/03/2024 at 5:23 PM     Finalized by (CST): Scottie Rocha MD on 12/03/2024 at 5:28 PM       XR ELBOW, COMPLETE (MIN 3 VIEWS), RIGHT (CPT=73080)    Result Date: 12/3/2024  PROCEDURE:  XR ELBOW, COMPLETE (MIN 3 VIEWS), RIGHT (CPT=73080)  TECHNIQUE:  Three views were obtained.  COMPARISON:  None.  INDICATIONS:  405 barbell dropped on throat, right elbow blew  PATIENT STATED HISTORY: (As transcribed by Technologist)   barbell dropped on throat, right elbow blew.    FINDINGS:  No acute fractures or osseous lesions are identified.  Radial capitellar relationship is within normal limits.  No significant elbow joint effusion.  Well corticated ossific density adjacent to the lateral epicondyle.  Soft tissue swelling.            CONCLUSION:  No acute fractures.  Mild soft tissue swelling along the dorsal aspect of the elbow.   LOCATION:  [id number]    Dictated by (CST): Elzbieta Villarreal MD on 12/03/2024 at 3:56 PM     Finalized by (CST): Elzbieta Villarreal MD on  12/03/2024 at 3:57 PM         IMPRESSION: Jace Panda is a 49 year old Right hand dominant male shoulder pain consistent with right triceps tendon tear    PLAN:   We had a detailed discussion outlining the etiology, anatomy, pathophysiology, and natural history of the patient's findings. Imaging was reviewed in detail and correlated to a 3-dimensional model of the patient's pathology.     We reviewed the treatment of this disease condition.  Due to the severity of the injury he would benefit from a open triceps tendon repair.  We Splane the risk benefits alternatives to surgery including the risk of nerve damage blood loss and infection.  Patient expressed their understanding of the treatment plan.  He will have surgery at soonest available ability.  He will also be immobilized postoperatively.  We also explained the patient would benefit physical therapy following surgery.  He expressed that their understanding of this treatment plan.    Rationale for 57 Modifier Addendum    Decision for Major Surgery  The decision for a major surgery was made during this visit/consultation based on review and discussion of the history of presentation, examination, available labs/imaging, and the patient's functional status. The medical and surgical history were considered with regards to risk and potential modifications needed.        FOLLOW-UP: Following surgery            Fam Dorman MD Orthopedic Surgery / Sports Medicine Specialist  [facility]  [address] [place] [address]  [email or web address]    t: [phone] f: [phone]    This note was dictated using Dragon software.  While it was briefly proofread prior to completion, some grammatical, spelling, and word choice errors due to dictation may still occur.

## 2024-12-09 NOTE — TELEPHONE ENCOUNTER
Ortho Surgery Request  Surgery: Right Elbow Open Triceps Tendon Repair      Surgical Assistant: YES  Surgery Day Request: 12/16/24  Estimated Procedure Length: 2.5 Hours  Anesthesia type: Block Interscalene   Position: Lateral Decubitus   Special Needs:[]Mini C-Arm  []Large C-arm  []Nurse Assist [x]SCD's [x]Surgical Assistant []Ultrasound []Ultrasound Cornelio  Equipment: Suture Anchors  [facility]  Post Op Visit Date: 2 Weeks in [place]  CPT Code: 54737     ICD Code: S46.311A  Medical Clearance Needed: NA  Preadmission Testing Orders:  Anesthesia testing protocols and anesthesia pre op orders will be used  Additional PAT orders:  Pre OP Orders:  Use EM procedure driven order set in addition to anesthesia protocol  Additional Pre Op Orders:  PCN Allergy:  No  If Yes:   __X__  Admit:  Hospital outpatient surgery  Home Health  No

## 2024-12-09 NOTE — TELEPHONE ENCOUNTER
Type of surgery:  Right Elbow Open Triceps Repairs  Date: 12/16/24  Location: [facility]  Medical Clearance: No     *Medical:      *Dental:      *Other:  Prior Authorization Status: Pending   Workers Comp:  [place]ancy:  [place]: Yes   POV: 1/2/25

## 2024-12-13 NOTE — DISCHARGE INSTRUCTIONS
HOME INSTRUCTIONS  [facility] HOME CARE INSTRUCTIONS: POST-OP ANESTHESIA  The medication that you received for sedation or general anesthesia can last up to 24 hours. Your judgment and reflexes may be altered, even if you feel like your normal self.      We Recommend:   Do not drive any motor vehicle or bicycle   Avoid mowing the lawn, playing sports, or working with power tools/applicances (power saws, electric knives or mixers)   That you have someone stay with you on your first night home   Do not drink alcohol or take sleeping pills or tranquilizers   Do not sign legal documents within 24 hours of your procedure   If you had a nerve block for your surgery, take extra care not to put any pressure on your arm or hand for 24 hours    It is normal:  For you to have a sore throat if you had a breathing tube during surgery (while you were asleep!). The sore throat should get better within 48 hours. You can gargle with warm salt water (1/2 tsp in 4 oz warm water) or use a throat lozenge for comfort  To feel muscle aches or soreness especially in the abdomen, chest or neck. The achy feeling should go away in the next 24 hours  To feel weak, sleepy or \"wiped out\". Your should start feeling better in the next 24 hours.   To experience mild discomforts such as sore lip or tongue, headache, cramps, gas pains or a bloated feeling in your abdomen.   To experience mild back pain or soreness for a day or two if you had spinal or epidural anesthesia.   If you had laparoscopic surgery, to feel shoulder pain or discomfort on the day of surgery.   For some patients to have nausea after surgery/anesthesia    If you feel nausea or experience vomiting:   Try to move around less.   Eat less than usual or drink only liquids until the next morning   Nausea should resolve in about 24 hours    If you have a problem when you are at home:    Call your surgeons office   Discharge Instructions: After Your Surgery  You’ve just had surgery. During  surgery, you were given medicine called anesthesia to keep you relaxed and free of pain. After surgery, you may have some pain or nausea. This is common. Here are some tips for feeling better and getting well after surgery.   Going home  Your healthcare provider will show you how to take care of yourself when you go home. They'll also answer your questions. Have an adult family member or friend drive you home. For the first 24 hours after your surgery:   Don't drive or use heavy equipment.  Don't make important decisions or sign legal papers.  Take medicines as directed.  Don't drink alcohol.  Have someone stay with you, if needed. They can watch for problems and help keep you safe.  Be sure to go to all follow-up visits with your healthcare provider. And rest after your surgery for as long as your provider tells you to.   Coping with pain  If you have pain after surgery, pain medicine will help you feel better. Take it as directed, before pain becomes severe. Also, ask your healthcare provider or pharmacist about other ways to control pain. This might be with heat, ice, or relaxation. And follow any other instructions your surgeon or nurse gives you.      Stay on schedule with your medicine.     Tips for taking pain medicine  To get the best relief possible, remember these points:   Pain medicines can upset your stomach. Taking them with a little food may help.  Most pain relievers taken by mouth need at least 20 to 30 minutes to start to work.  Don't wait till your pain becomes severe before you take your medicine. Try to time your medicine so that you can take it before starting an activity. This might be before you get dressed, go for a walk, or sit down for dinner.  Constipation is a common side effect of some pain medicines. Call your healthcare provider before taking any medicines such as laxatives or stool softeners to help ease constipation. Also ask if you should skip any foods. Drinking lots of fluids and  eating foods such as fruits and vegetables that are high in fiber can also help. Remember, don't take laxatives unless your surgeon has prescribed them.  Drinking alcohol and taking pain medicine can cause dizziness and slow your breathing. It can even be deadly. Don't drink alcohol while taking pain medicine.  Pain medicine can make you react more slowly to things. Don't drive or run machinery while taking pain medicine.  Your healthcare provider may tell you to take acetaminophen to help ease your pain. Ask them how much you're supposed to take each day. Acetaminophen or other pain relievers may interact with your prescription medicines or other over-the-counter (OTC) medicines. Some prescription medicines have acetaminophen and other ingredients in them. Using both prescription and OTC acetaminophen for pain can cause you to accidentally overdose. Read the labels on your OTC medicines with care. This will help you to clearly know the list of ingredients, how much to take, and any warnings. It may also help you not take too much acetaminophen. If you have questions or don't understand the information, ask your pharmacist or healthcare provider to explain it to you before you take the OTC medicine.   Managing nausea  Some people have an upset stomach (nausea) after surgery. This is often because of anesthesia, pain, or pain medicine, less movement of food in the stomach, or the stress of surgery. These tips will help you handle nausea and eat healthy foods as you get better. If you were on a special food plan before surgery, ask your healthcare provider if you should follow it while you get better. Check with your provider on how your eating should progress. It may depend on the surgery you had. These general tips may help:   Don't push yourself to eat. Your body will tell you when to eat and how much.  Start off with clear liquids and soup. They're easier to digest.  Next try semi-solid foods as you feel ready.  These include mashed potatoes, applesauce, and gelatin.  Slowly move to solid foods. Don’t eat fatty, rich, or spicy foods at first.  Don't force yourself to have 3 large meals a day. Instead eat smaller amounts more often.  Take pain medicines with a small amount of solid food, such as crackers or toast. This helps prevent nausea.  When to call your healthcare provider  Call your healthcare provider right away if you have any of these:   You still have too much pain, or the pain gets worse, after taking the medicine. The medicine may not be strong enough. Or there may be a complication from the surgery.  You feel too sleepy, dizzy, or groggy. The medicine may be too strong.  Side effects such as nausea or vomiting. Your healthcare provider may advise taking other medicines to .  Skin changes such as rash, itching, or hives. This may mean you have an allergic reaction. Your provider may advise taking other medicines.  The incision looks different (for instance, part of it opens up).  Bleeding or fluid leaking from the incision site, and weren't told to expect that.  Fever of 100.4°F (38°C) or higher, or as directed by your provider.  Call 911  Call 911 right away if you have:   Trouble breathing  Facial swelling    If you have obstructive sleep apnea   You were given anesthesia medicine during surgery to keep you comfortable and free of pain. After surgery, you may have more apnea spells because of this medicine and other medicines you were given. The spells may last longer than normal.    At home:  Keep using the continuous positive airway pressure (CPAP) device when you sleep. Unless your healthcare provider tells you not to, use it when you sleep, day or night. CPAP is a common device used to treat obstructive sleep apnea.  Talk with your provider before taking any pain medicine, muscle relaxants, or sedatives. Your provider will tell you about the possible dangers of taking these medicines.  Contact your  provider if your sleeping changes a lot even when taking medicines as directed.  [place] last reviewed this educational content on 10/1/2021  © 6968-7211 [employer]. All rights reserved. This information is not intended as a substitute for professional medical care. Always follow your healthcare professional's instructions.      Triceps Tendon Repair  Post-Operative Guidelines    This document will help you plan for your post-operative recovery course following surgery. Please read and retain this information for future reference. Many of the questions you may have later can be answered by referring to this information.    DIET   Begin with clear liquids and light foods (jellos, soups, etc.).   Progress to your normal diet if you are not nauseated.     ACTIVITY   Do not lift anything heavier than your cell phone with your operated arm.   Avoid shaking hands, pushing doors open, and do not attempt to open jars.  NO driving until the brace is not needed (6 weeks after surgery).   You may return to sedentary work or school 2-3 days after surgery, if pain is tolerable.     SHOULDER SLING  Your shoulder sling is to be worn for the first 2 weeks after surgery while the splint is in place.  You will be transitioned from a splint into an elbow brace at your first follow-up visit   This brace is to be worn for a total duration of 6 weeks after your surgery.   It is not necessary to wear the shoulder sling along with the elbow brace.    A pillow behind the elbow may help when lying down to prevent the elbow from sliding backwards.       WOUND CARE   Keep the splint clean and dry - it will be removed at your first post op visit.   You may begin showering the day after your surgery.   To shower, remove the shoulder sling and use a waterproof shower bag (can be purchased on [employer]) or a well-sealed Saran wrap to keep the splint dry.   Do not apply creams, ointment, or lotions to your incisions while they are  healing (4 weeks).   You should wash under your arm daily.     NORMAL SENSATIONS AND FINDINGS AFTER SURGERY  Pain  Finger swelling / stiffness up to 2 weeks  Numbness and tingling in the fingers, this should resolve in 36 hours.   Bruising  Low grade temperature less than 101.0 for up to a week after surgery  PAIN MANAGEMENT  A nerve block is used at the time of surgery. This will wear off within 12-24 hours and it is not uncommon for patients to encounter more pain on the first or second day after surgery when swelling peaks.   Most patients will require some narcotic pain medication for a short period of time - this can be taken as per directions on the bottle.   If you are having problems with nausea and vomiting, contact the office to possibly have your medications changed.   Do not drive a car or operate machinery while taking the narcotic medication or with elbow brace on - typically 6 weeks after surgery.   Please avoid alcohol use while taking narcotic pain medication.   Please avoid NSAIDs for the first 4 weeks (Advil, Aleve, Mobic, etc.) as they may slow down the healing process.    NON-NARCOTIC PAIN MEDICATIONS   Extra-Strength Tylenol (Acetaminophen) 500mg Tablets (available over the counter)  Indication: pain, non-narcotic pain reliever  Use: Take 1-2 tablets every 6 hours.  Do not exceed 8 tablets in a 24-hour period.    NARCOTIC PAIN MEDICATIONS  OPIOIDS/NARCOTICS are prescription pain medications.  One of the following medications will be prescribed and should only be used if adequate pain control is not achieved with combination of ice, Aleve, and extra-strength Tylenol outlined above.   Side effects include constipation, nausea, drowsiness, dry mouth, itchiness.  Use a 0-10 pain scale to decide how much medication to take:                                   Pain 0-4/10: No narcotics necessary                                  Pain 5-7/10: Take one tablet                                    Pain  8-10/10: Take two tablets   Oxycodone (Roxicodone) 5mg tablet  Indication: pain 5/10 or greater.  Narcotic pain medication.  Use: 1-2 tabs every 4-6 hours as needed for pain.  Do not exceed more than 10 tabs in any 24-hour time period unless otherwise directed.  Tramadol (Ultram) 50mg tablet  Indication: pain 5/10 or greater.  Non-opioid narcotic like medication  Use: 1-2 tabs every 6 hours as needed for pain.  Do not take more than 8 tablets in any 24-hour time period.  medications to manage side effects  Narcotics may cause nausea and constipation. Bowel regimen is recommended.  Colace (Docusate) 100 mg - available OTC  Indication:  constipation, stool softener.  Take consistently while on narcotics.  Use:  Take 1 pill three times per day while you are taking narcotics.    Senokot (Senna) 8.6 mg - available OTC  Indication: constipation, stool laxative.  Take consistently while on narcotics.   Use: Take 2 pills at bedtime.  Increase to 2 pills twice daily if no bowel movement by post-surgery day 2.    Zofran (Ondansetron ODT) 4 mg- Prescription sent to pharmacy  Indication: nausea.  Take as needed.  Use: Take 1 pill AS NEEDED every 8 hours, do not exceed 3 pills in 24 hours.    ICE THERAPY   Icing is very important in the initial post-operative period and should begin immediately after surgery.   Use ice packs for 30 minutes at a time, 3-4 times a day until your first post-operative visit. Care should be taken with icing to avoid frostbite to the skin.   You do not need to wake up in the middle of the night to change icepacks.     EXERCISES   Physical therapy is an important part of your recovery process.   After your first post-operative visit with Dr. Dorman you will see a physical therapist 2 times a week.   Additionally, you will perform home exercises 4 times a day. These exercises include hand and wrist range of motion.   REMEMBER: You must NOT actively bend your elbow by using your own muscle power.      EMERGENCIES**   Contact Dr. Dorman’s Team via [employer] [place] [facility] or at [phone] if any of the following are present:   Unrelenting pain.  Fever (over 101° - it is normal to have a low-grade fever for the first day or two following surgery)   Color change in hand and wrist (cold and blue)   Difficulty breathing.   Excessive nausea/vomiting   If you have an emergency after office hours or on the weekend, contact the office at [phone] and you will be connected to our pager service. This will connect you with the Physician on call.   If you have an emergency that requires immediate attention proceed to the nearest emergency room.     FOLLOW-UP CARE/QUESTIONS   Your first post-operative appointment will be 7-10 days following surgery for splint removal, wound evaluation and to answer any questions you have regarding the procedure.   Typically, the first physical therapy appointment is to begin 2 weeks after surgery.    If you have any further questions please contact Dr. Dorman’s team directly.

## 2024-12-16 ENCOUNTER — TELEPHONE (OUTPATIENT)
Dept: ORTHOPEDICS CLINIC | Facility: CLINIC | Age: 49
End: 2024-12-16

## 2024-12-16 ENCOUNTER — ANESTHESIA EVENT (OUTPATIENT)
Dept: SURGERY | Facility: HOSPITAL | Age: 49
End: 2024-12-16
Payer: COMMERCIAL

## 2024-12-16 ENCOUNTER — HOSPITAL ENCOUNTER (OUTPATIENT)
Facility: HOSPITAL | Age: 49
Setting detail: HOSPITAL OUTPATIENT SURGERY
Discharge: HOME | End: 2024-12-16
Attending: STUDENT IN AN ORGANIZED HEALTH CARE EDUCATION/TRAINING PROGRAM | Admitting: STUDENT IN AN ORGANIZED HEALTH CARE EDUCATION/TRAINING PROGRAM
Payer: COMMERCIAL

## 2024-12-16 ENCOUNTER — ANESTHESIA (OUTPATIENT)
Dept: SURGERY | Facility: HOSPITAL | Age: 49
End: 2024-12-16
Payer: COMMERCIAL

## 2024-12-16 VITALS
SYSTOLIC BLOOD PRESSURE: 131 MMHG | HEART RATE: 85 BPM | OXYGEN SATURATION: 94 % | DIASTOLIC BLOOD PRESSURE: 69 MMHG | RESPIRATION RATE: 18 BRPM | WEIGHT: 242 LBS | BODY MASS INDEX: 34.65 KG/M2 | HEIGHT: 70 IN | TEMPERATURE: 99 F

## 2024-12-16 DIAGNOSIS — S46.311S RUPTURE OF RIGHT TRICEPS TENDON, SEQUELA: Primary | ICD-10-CM

## 2024-12-16 DIAGNOSIS — S46.311S TRICEPS TENDON RUPTURE, RIGHT, SEQUELA: Primary | ICD-10-CM

## 2024-12-16 LAB
GLUCOSE BLDC GLUCOMTR-MCNC: 111 MG/DL (ref 70–99)
GLUCOSE BLDC GLUCOMTR-MCNC: 146 MG/DL (ref 70–99)

## 2024-12-16 PROCEDURE — 64415 NJX AA&/STRD BRCH PLXS IMG: CPT | Performed by: STUDENT IN AN ORGANIZED HEALTH CARE EDUCATION/TRAINING PROGRAM

## 2024-12-16 PROCEDURE — 0LM30ZZ REATTACHMENT OF RIGHT UPPER ARM TENDON, OPEN APPROACH: ICD-10-PCS | Performed by: STUDENT IN AN ORGANIZED HEALTH CARE EDUCATION/TRAINING PROGRAM

## 2024-12-16 PROCEDURE — 82962 GLUCOSE BLOOD TEST: CPT

## 2024-12-16 DEVICE — IMPLSYS 2NDRY FIXATN BIOSWVLK 4.75X19.1
Type: IMPLANTABLE DEVICE | Site: ELBOW | Status: FUNCTIONAL
Brand: ARTHREX®

## 2024-12-16 RX ORDER — METOCLOPRAMIDE 10 MG/1
10 TABLET ORAL ONCE
Status: COMPLETED | OUTPATIENT
Start: 2024-12-16 | End: 2024-12-16

## 2024-12-16 RX ORDER — FAMOTIDINE 10 MG/ML
20 INJECTION, SOLUTION INTRAVENOUS ONCE
Status: COMPLETED | OUTPATIENT
Start: 2024-12-16 | End: 2024-12-16

## 2024-12-16 RX ORDER — NICOTINE POLACRILEX 4 MG
30 LOZENGE BUCCAL
Status: DISCONTINUED | OUTPATIENT
Start: 2024-12-16 | End: 2025-01-02

## 2024-12-16 RX ORDER — FAMOTIDINE 20 MG/1
20 TABLET, FILM COATED ORAL ONCE
Status: COMPLETED | OUTPATIENT
Start: 2024-12-16 | End: 2024-12-16

## 2024-12-16 RX ORDER — ONDANSETRON 2 MG/ML
INJECTION INTRAMUSCULAR; INTRAVENOUS AS NEEDED
Status: DISCONTINUED | OUTPATIENT
Start: 2024-12-16 | End: 2024-12-17 | Stop reason: SURG

## 2024-12-16 RX ORDER — HYDROMORPHONE HYDROCHLORIDE 1 MG/ML
0.2 INJECTION, SOLUTION INTRAMUSCULAR; INTRAVENOUS; SUBCUTANEOUS EVERY 5 MIN PRN
Status: DISCONTINUED | OUTPATIENT
Start: 2024-12-16 | End: 2025-01-02

## 2024-12-16 RX ORDER — ONDANSETRON 4 MG/1
4 TABLET, FILM COATED ORAL EVERY 8 HOURS PRN
Qty: 20 TABLET | Refills: 0 | Status: SHIPPED | OUTPATIENT
Start: 2024-12-16

## 2024-12-16 RX ORDER — OXYCODONE HYDROCHLORIDE 5 MG/1
5 TABLET ORAL EVERY 4 HOURS PRN
Qty: 20 TABLET | Refills: 0 | Status: SHIPPED | OUTPATIENT
Start: 2024-12-16

## 2024-12-16 RX ORDER — ROCURONIUM BROMIDE 10 MG/ML
INJECTION, SOLUTION INTRAVENOUS AS NEEDED
Status: DISCONTINUED | OUTPATIENT
Start: 2024-12-16 | End: 2024-12-17 | Stop reason: SURG

## 2024-12-16 RX ORDER — NICOTINE POLACRILEX 4 MG
15 LOZENGE BUCCAL
Status: DISCONTINUED | OUTPATIENT
Start: 2024-12-16 | End: 2025-01-02

## 2024-12-16 RX ORDER — LIDOCAINE HYDROCHLORIDE 20 MG/ML
INJECTION, SOLUTION EPIDURAL; INFILTRATION; INTRACAUDAL; PERINEURAL AS NEEDED
Status: DISCONTINUED | OUTPATIENT
Start: 2024-12-16 | End: 2024-12-17 | Stop reason: SURG

## 2024-12-16 RX ORDER — METOCLOPRAMIDE HYDROCHLORIDE 5 MG/ML
10 INJECTION INTRAMUSCULAR; INTRAVENOUS ONCE
Status: COMPLETED | OUTPATIENT
Start: 2024-12-16 | End: 2024-12-16

## 2024-12-16 RX ORDER — TRAMADOL HYDROCHLORIDE 50 MG/1
50 TABLET ORAL EVERY 6 HOURS PRN
Qty: 20 TABLET | Refills: 0 | Status: SHIPPED | OUTPATIENT
Start: 2024-12-16

## 2024-12-16 RX ORDER — ASPIRIN 81 MG/1
81 TABLET ORAL DAILY
Qty: 21 TABLET | Refills: 0 | Status: SHIPPED | OUTPATIENT
Start: 2024-12-16

## 2024-12-16 RX ORDER — ROPIVACAINE HYDROCHLORIDE 5 MG/ML
INJECTION, SOLUTION EPIDURAL; INFILTRATION; PERINEURAL
Status: COMPLETED | OUTPATIENT
Start: 2024-12-16 | End: 2024-12-16

## 2024-12-16 RX ORDER — SODIUM CHLORIDE, SODIUM LACTATE, POTASSIUM CHLORIDE, CALCIUM CHLORIDE 600; 310; 30; 20 MG/100ML; MG/100ML; MG/100ML; MG/100ML
INJECTION, SOLUTION INTRAVENOUS CONTINUOUS
Status: DISCONTINUED | OUTPATIENT
Start: 2024-12-16 | End: 2025-01-02

## 2024-12-16 RX ORDER — MIDAZOLAM HYDROCHLORIDE 1 MG/ML
INJECTION INTRAMUSCULAR; INTRAVENOUS AS NEEDED
Status: DISCONTINUED | OUTPATIENT
Start: 2024-12-16 | End: 2024-12-17 | Stop reason: SURG

## 2024-12-16 RX ORDER — OXYCODONE AND ACETAMINOPHEN 5; 325 MG/1; MG/1
1 TABLET ORAL EVERY 4 HOURS PRN
Status: DISCONTINUED | OUTPATIENT
Start: 2024-12-16 | End: 2025-01-02

## 2024-12-16 RX ORDER — NALOXONE HYDROCHLORIDE 0.4 MG/ML
0.08 INJECTION, SOLUTION INTRAMUSCULAR; INTRAVENOUS; SUBCUTANEOUS AS NEEDED
Status: DISCONTINUED | OUTPATIENT
Start: 2024-12-16 | End: 2024-12-16

## 2024-12-16 RX ORDER — PROCHLORPERAZINE EDISYLATE 5 MG/ML
5 INJECTION INTRAMUSCULAR; INTRAVENOUS EVERY 8 HOURS PRN
Status: DISCONTINUED | OUTPATIENT
Start: 2024-12-16 | End: 2025-01-02

## 2024-12-16 RX ORDER — DEXAMETHASONE SODIUM PHOSPHATE 4 MG/ML
VIAL (ML) INJECTION AS NEEDED
Status: DISCONTINUED | OUTPATIENT
Start: 2024-12-16 | End: 2024-12-17 | Stop reason: SURG

## 2024-12-16 RX ORDER — DOCUSATE SODIUM 100 MG/1
100 CAPSULE, LIQUID FILLED ORAL 2 TIMES DAILY
Qty: 20 CAPSULE | Refills: 0 | Status: SHIPPED | OUTPATIENT
Start: 2024-12-16

## 2024-12-16 RX ORDER — DEXTROSE MONOHYDRATE 25 G/50ML
50 INJECTION, SOLUTION INTRAVENOUS
Status: DISCONTINUED | OUTPATIENT
Start: 2024-12-16 | End: 2025-01-02

## 2024-12-16 RX ORDER — LABETALOL HYDROCHLORIDE 5 MG/ML
INJECTION, SOLUTION INTRAVENOUS AS NEEDED
Status: DISCONTINUED | OUTPATIENT
Start: 2024-12-16 | End: 2024-12-17 | Stop reason: SURG

## 2024-12-16 RX ORDER — ACETAMINOPHEN 500 MG
1000 TABLET ORAL ONCE
Status: COMPLETED | OUTPATIENT
Start: 2024-12-16 | End: 2024-12-16

## 2024-12-16 RX ORDER — HYDROMORPHONE HYDROCHLORIDE 1 MG/ML
0.4 INJECTION, SOLUTION INTRAMUSCULAR; INTRAVENOUS; SUBCUTANEOUS EVERY 5 MIN PRN
Status: DISCONTINUED | OUTPATIENT
Start: 2024-12-16 | End: 2025-01-02

## 2024-12-16 RX ORDER — ONDANSETRON 2 MG/ML
4 INJECTION INTRAMUSCULAR; INTRAVENOUS EVERY 6 HOURS PRN
Status: DISCONTINUED | OUTPATIENT
Start: 2024-12-16 | End: 2025-01-02

## 2024-12-16 RX ADMIN — SODIUM CHLORIDE, SODIUM LACTATE, POTASSIUM CHLORIDE, CALCIUM CHLORIDE: 600; 310; 30; 20 INJECTION, SOLUTION INTRAVENOUS at 15:02:00

## 2024-12-16 RX ADMIN — ROPIVACAINE HYDROCHLORIDE 30 ML: 5 INJECTION, SOLUTION EPIDURAL; INFILTRATION; PERINEURAL at 13:12:00

## 2024-12-16 RX ADMIN — ROCURONIUM BROMIDE 100 MG: 10 INJECTION, SOLUTION INTRAVENOUS at 13:25:00

## 2024-12-16 RX ADMIN — DEXAMETHASONE SODIUM PHOSPHATE 8 MG: 4 MG/ML VIAL (ML) INJECTION at 13:25:00

## 2024-12-16 RX ADMIN — LIDOCAINE HYDROCHLORIDE 40 MG: 20 INJECTION, SOLUTION EPIDURAL; INFILTRATION; INTRACAUDAL; PERINEURAL at 13:25:00

## 2024-12-16 RX ADMIN — LABETALOL HYDROCHLORIDE 10 MG: 5 INJECTION, SOLUTION INTRAVENOUS at 14:49:00

## 2024-12-16 RX ADMIN — MIDAZOLAM HYDROCHLORIDE 2 MG: 1 INJECTION INTRAMUSCULAR; INTRAVENOUS at 13:05:00

## 2024-12-16 RX ADMIN — ONDANSETRON 4 MG: 2 INJECTION INTRAMUSCULAR; INTRAVENOUS at 14:41:00

## 2024-12-16 RX ADMIN — LABETALOL HYDROCHLORIDE 5 MG: 5 INJECTION, SOLUTION INTRAVENOUS at 15:00:00

## 2024-12-16 NOTE — OPERATIVE REPORT
[facility] OPERATIVE RECORD  ATTENDING SURGEON: Fam Dorman MD  ASSISTANT(S): Micah Flannery CSA  PRELIMINARY DIAGNOSIS:  1.  right Triceps Tendon Rupture     POSTOPERATIVE DIAGNOSIS:  1.  Right Triceps Tendon Rupture    THE OPERATION:  1.  Right Open Triceps Tendon Repair ([id number])     ANESTHESIA: GA + Regional Block  ANESTHESIOLOGIST:  Abby Jordan  ANTIBIOTICS: Cefazolin 2g within 60 minutes of surgical incision.    IMPLANTS:   Implant Name Type Inv. Item Serial No.  Lot No. LRB No. Used Action   SYSTEM INT FIX 4.75X19.1MM W/ BIOCOMPOSITE - SN/A  SYSTEM INT FIX 4.75X19.1MM W/ BIOCOMPOSITE N/A [employer] WD [id number] Right 1 Implanted   SYSTEM INT FIX 4.75X19.1MM W/ BIOCOMPOSITE - SN/A  SYSTEM INT FIX 4.75X19.1MM W/ BIOCOMPOSITE N/A [employer] WD [id number] Right 1 Implanted       PATHOLOGY/CULTURES: None  ESTIMATED BLOOD LOSS: No data recorded  DRAINS: None  COMPLICATIONS: No intraoperative nor immediate postoperative complications noted.  COUNTS: All sponge and needle counts were correct at the conclusion of the procedure.  TOURNIQUET TIME: 65 Minutes  OPERATIVE FINDINGS    Indications:  Jace is a 49 year old male with history, physical examination, and imaging findings consistent with right triceps tendon rupture.  Operative and nonoperative options were discussed with him in my office and we ultimately agreed that surgery would provide the highest likelihood of symptomatic relief and functional improvement.  The risks and benefits of surgery as well as the postoperative rehabilitation plan were reviewed. He voiced a good understanding of treatment options, risks and benefits, and alternatives to surgery. We discuss in detail the anticipated rehabilitation timeline and commitment to physical therapy that was required to attain a satisfactory result. He was given the opportunity to ask questions, which were all answered to the best of my ability and to his satisfaction. Jace  wished to proceed.   On the day of surgery, the Jace was seen in the preoperative area.  I confirmed his identity by name and birthday. We reviewed and confirmed the surgical consent including laterality.  The surgical site was marked with my initials.  We re-reviewed the risks and benefits of the procedure and I answered all additional questions to his satisfaction.      OPERATIVE REPORT:   Jace was taken to the operating room in stable condition.    The patient was positioned in the left lateral decubitus position position. They subsequently underwent standard prep and drape. A nonsterile tourniquet was applied to the operative limb. A preoperative timeout was performed confirming the correct surgical site, procedure, and preoperative imaging was reviewed.      The procedure began with a roughly 15 cm curvilinear incision over the posterior aspect of the elbow.  It began with a #10 blade through skin and deep dermis down to the underlying subcutaneous fat and fascia.  Retractors were utilized to visualize the proximal aspect of the triceps tendon which was intact.  Distally towards the elbow joint the tendon was found to be completely ruptured with all 3 heads of the triceps tendon retracted.  There was a small pseudocapsule that was beginning to form and there was significant hematoma and fibrinous tissue which was aggressively debrided.  After careful dissection the tendon was carefully trimmed to stable edges were only healthy endon was visualized.         Next the olecranon was approached.  With a rongeur and curette it was carefully debrided back to bleeding bone creating a healthy bed for repair of the tendon.    Next the procedure began by suturing in a [place] fashion a FiberWire tape along the medial aspect of the tendon and next a tiger wire tape was sutured in a [place] fashion.  The tape was found to be sutured appropriately and next a loop suture was utilized and passed adjacent to where the tiger  wire and FiberWire tapes were exiting the tendon distally.  Next 2 drill holes were placed from distal to proximal with care taken to avoid the ulnohumeral joint.  The tapes and loop suture were subsequently passed and brought down creating a compression of the torn tendon onto the bleeding bed of bone on the proximal olecranon.  Finally utilizing an [employer] swivel lock all the tapes and suture were securely passed and secured.  After final fixation the elbow was taken through range of motion and the tendon was found to be securely intact.  The wound was copiously irrigated was closed in layered fashion.  The elbow was placed in a splint that was well-padded and roughly 30 degrees of elbow flexion.          The final count prior to closure was correct. The patient tolerated the procedure well without complications.     Jace was taken to the recovery room in a stable condition with plan for ambulatory discharge to home. He was provided my postoperative discharge booklet, appropriate home exercises, script for outpatient physical therapy, and a copy of my rehabilitation guidelines.      POST OPERATIVE PLAN:  Activity Precautions: NWB RUE, Splint on  DVT Prophylaxis: ASA 81  Follow-up Visit: POD # 7-14      Fam Dorman MD

## 2024-12-16 NOTE — ANESTHESIA PROCEDURE NOTES
Airway  Date/Time: 12/16/2024 1:28 PM  Urgency: Elective      General Information and Staff    Patient location during procedure: OR  Anesthesiologist: Abby Jordan MD  Performed: anesthesiologist   Performed by: Abby Jordan MD  Authorized by: Abby Jordan MD      Indications and Patient Condition  Indications for airway management: anesthesia  Sedation level: deep  Preoxygenated: yes  Patient position: sniffing  Mask difficulty assessment: 3 - difficult mask (inadequate, unstable or two providers) +/- NMBA    Final Airway Details  Final airway type: endotracheal airway      Successful airway: ETT  Cuffed: yes   Successful intubation technique: Video laryngoscopy  Endotracheal tube insertion site: oral  Blade type: [place].  Blade size: #4  ETT size (mm): 7.5    Cormack-Lehane Classification: grade I - full view of glottis  Placement verified by: capnometry   Cuff volume (mL): 7  Measured from: teeth  ETT to teeth (cm): 23  Number of attempts at approach: 1    Additional Comments  1 attempt, atraumatic

## 2024-12-16 NOTE — BRIEF OP NOTE
Pre-Operative Diagnosis: Triceps tendon rupture, right, initial encounter [S46.311A]     Post-Operative Diagnosis: Triceps tendon rupture, right, initial encounter [S46.311A]      Procedure Performed:   Right elbow open triceps tendon repair    Surgeons and Role:     * Fam Dorman MD - Primary    Assistant(s):  Surgical Assistant.: Micah Flannery CSA     Surgical Findings: Right Torn Triceps Tendon     Specimen: None     Estimated Blood Loss: No data recorded    Dictation Number:  Pending    Fam Dorman MD  12/16/2024  2:49 PM

## 2024-12-16 NOTE — ANESTHESIA PREPROCEDURE EVALUATION
Anesthesia PreOp Note    HPI:     Jace Panda is a 49 year old male who presents for preoperative consultation requested by: Fam Dorman MD    Date of Surgery: 12/16/2024    Procedure(s):  Right elbow open triceps tendon repair  Indication: Triceps tendon rupture, right, initial encounter [S46.311A]    Relevant Problems   No relevant active problems       NPO:  Last Liquid Consumption Date: 12/15/24  Last Liquid Consumption Time: 2230  Last Solid Consumption Date: 12/15/24  Last Solid Consumption Time: 2230  Last Liquid Consumption Date: 12/15/24          History Review:  Patient Active Problem List    Diagnosis Date Noted    Type 2 diabetes mellitus without complication, without long-term current use of insulin (HCC) 02/21/2024    Elevated hematocrit 02/21/2024    Obesity (BMI 30-39.9) 05/09/2018    Testicle cancer (HCC) 04/13/2017    Testosterone deficiency     Leukocytosis     Elevated blood sugar 10/09/2013    Essential hypertension, benign 04/06/2013    Umbilical hernia 12/03/2012    Lumbago 06/29/2012    Esophageal reflux 06/29/2012       Past Medical History:    Back problem    Cancer (HCC)    testicular ca    Carpal tunnel syndrome on both sides    hands, R hand repaired through surgery, L hand unrepaired    Cellulitis    arm    Diabetes (HCC)    Elevated blood sugar    Essential hypertension, benign    High blood pressure    Low testosterone    Unspecified essential hypertension    Visual impairment    readers       Past Surgical History:   Procedure Laterality Date    Back surgery  10/01/2011    Lumbar area, treated with needle and heat 10/11    Hernia surgery      umbilical    Orchiectomy   Left 10/01/2016    [facility]    s/p radiation       Other surgical history      R carpal, Neuroplasty Decompression Median Nerve  At Carpal tunnel     Tonsillectomy      As a child    Vasectomy  10/01/2016    [facility]       Prescriptions Prior to Admission[1]  Current Medications and Prescriptions  Ordered in Epic[2]    Allergies[3]    Family History   Problem Relation Age of Onset    Hypertension Father      Social History     Socioeconomic History    Marital status:    Tobacco Use    Smoking status: Every Day     Current packs/day: 1.00     Average packs/day: 1 pack/day for 1 year (1.0 ttl pk-yrs)     Types: Cigarettes    Smokeless tobacco: Never    Tobacco comments:     started again 2020, previously Quit 12/2009 1 1/2 ppd since 1991. Unspecified tabacco product   Vaping Use    Vaping status: Never Used   Substance and Sexual Activity    Alcohol use: Not Currently     Comment: social    Drug use: Yes     Frequency: 1.0 times per week     Types: Cannabis   Other Topics Concern    Caffeine Concern Yes     Comment: 3-4 cans of big red bulls daily and 8 cans of pop daily.     Exercise Yes     Comment: 6x/week.        Available pre-op labs reviewed.  Lab Results   Component Value Date    WBC 10.8 12/03/2024    RBC 5.71 (H) 12/03/2024    HGB 17.2 12/03/2024    HCT 49.9 12/03/2024    MCV 87.4 12/03/2024    MCH 30.1 12/03/2024    MCHC 34.5 12/03/2024    RDW 12.9 12/03/2024    .0 12/03/2024     Lab Results   Component Value Date     12/03/2024    K 3.6 12/03/2024     12/03/2024    CO2 28.0 12/03/2024    BUN 18 12/03/2024    CREATSERUM 1.17 12/03/2024     (H) 12/03/2024    PGLU 111 (H) 12/16/2024    CA 9.3 12/03/2024          Vital Signs:  Body mass index is 34.72 kg/m².   height is 1.778 m (5' 10\") and weight is 109.8 kg (242 lb). His oral temperature is 97.4 °F (36.3 °C). His blood pressure is 129/70 and his pulse is 79. His respiration is 16 and oxygen saturation is 97%.   Vitals:    12/11/24 0857 12/16/24 1117   BP:  129/70   Pulse:  79   Resp:  16   Temp:  97.4 °F (36.3 °C)   TempSrc:  Oral   SpO2:  97%   Weight: 111.1 kg (245 lb) 109.8 kg (242 lb)   Height: 1.778 m (5' 10\") 1.778 m (5' 10\")        Anesthesia Evaluation      Airway   Mallampati: II  TM distance: >3 FB  Neck ROM:  full  Dental - Dentition appears grossly intact     Pulmonary - normal exam   Cardiovascular   (+) hypertension    Rhythm: regular  Rate: normal    Neuro/Psych    (+)  neuromuscular disease,        GI/Hepatic/Renal    (+) GERD    Endo/Other    (+) diabetes mellitus  Abdominal                  Anesthesia Plan:   ASA:  3  Plan:   General  Airway:  ETT  Post-op Pain Management: IV analgesics and Interscalene block  Informed Consent Plan and Risks Discussed With:  Patient  Use of Blood Products Discussed With:  Patient      I have informed Jace HANSEN Gerkeily and/or legal guardian or family member of the nature of the anesthetic plan, benefits, risks including possible dental damage if relevant, major complications, and any alternative forms of anesthetic management.   All of the patient's questions were answered to the best of my ability. The patient desires the anesthetic management as planned.  Abby Jordan MD  12/16/2024 11:42 AM  Present on Admission:  **None**           [1]   Medications Prior to Admission   Medication Sig Dispense Refill Last Dose/Taking    naproxen 500 MG Oral Tab Take 1 tablet (500 mg total) by mouth 2 (two) times daily as needed. 20 tablet 0 12/10/2024    Naloxone HCl 4 MG/0.1ML Nasal Liquid 4 mg by Intranasal route as needed (May repeat as needed in other nostril if symptoms persist). If patient remains unresponsive, repeat dose in other nostril 2-5 minutes after first dose. 1 kit 0 Taking As Needed    Tirzepatide (MOUNJARO) 5 MG/0.5ML Subcutaneous Solution Auto-injector Inject 5 mg into the skin once a week. 2 mL 1 12/7/2024    hydroCHLOROthiazide 25 MG Oral Tab Take 1 tablet (25 mg total) by mouth After lunch.   12/15/2024 at  4:00 PM    zolpidem 10 MG Oral Tab Take 1 tablet (10 mg total) by mouth nightly. 30 tablet 0 12/15/2024 at  8:00 PM    CANDESARTAN 32 MG Oral Tab TAKE 1 TABLET BY MOUTH DAILY. (Patient taking differently: Take 1 tablet (32 mg total) by mouth After lunch.) 90 tablet 0  12/15/2024 at  4:00 PM    amLODIPine 5 MG Oral Tab Take 1 tablet (5 mg total) by mouth daily. (Patient taking differently: Take 1 tablet (5 mg total) by mouth After lunch.) 90 tablet 0 12/15/2024 at  4:00 PM    Testosterone cypionate (DEPOTESTOTERONE) 200 MG/ML Intramuscular Solution Inject 0.5 mL (100 mg total) into the muscle See Admin Instructions. Every 10 days.   12/13/2024    PANTOPRAZOLE 40 MG Oral Tab EC TAKE 1 TABLET BY MOUTH BEFORE BREAKFAST (Patient taking differently: Take 1 tablet (40 mg total) by mouth daily as needed.) 90 tablet 0 More than a month   [2]   Current Facility-Administered Medications Ordered in Epic   Medication Dose Route Frequency Provider Last Rate Last Admin    lactated ringers infusion   Intravenous Continuous Fam Dorman MD 20 mL/hr at 12/16/24 1129 New Bag at 12/16/24 1129    ceFAZolin (Ancef) 2g in 10mL IV syringe premix  2 g Intravenous Once Fam Dorman MD         Current Outpatient Medications Ordered in Epic   Medication Sig Dispense Refill    aspirin 81 MG Oral Tab EC Take 1 tablet (81 mg total) by mouth daily. 21 tablet 0    traMADol 50 MG Oral Tab Take 1 tablet (50 mg total) by mouth every 6 (six) hours as needed for Pain. No alcohol or driving on this med. Stop if lethargic or hallucinating. 20 tablet 0    oxyCODONE 5 MG Oral Tab Take 1 tablet (5 mg total) by mouth every 4 (four) hours as needed for Pain. 20 tablet 0    ondansetron (ZOFRAN) 4 mg tablet Take 1 tablet (4 mg total) by mouth every 8 (eight) hours as needed for Nausea. 20 tablet 0    docusate sodium 100 MG Oral Cap Take 1 capsule (100 mg total) by mouth 2 (two) times daily. 20 capsule 0   [3]   Allergies  Allergen Reactions    Ace Inhibitors Coughing    Hydrocodone OTHER (SEE COMMENTS)     nausea

## 2024-12-16 NOTE — ANESTHESIA PROCEDURE NOTES
Peripheral Block    Performed by: Abby Jordan MD  Authorized by: Abby Jordan MD      General Information and Staff    Start Time:   Anesthesiologist:  Abby Jordan MD  Patient Location:  PACU      Site Identification: real time ultrasound guided and image stored and retrievable      Reason for Block: at surgeon's request and post-op pain management    Preanesthetic Checklist: 2 patient identifers, IV checked, risks and benefits discussed, monitors and equipment checked, pre-op evaluation, timeout performed, anesthesia consent and sterile technique used      Procedure Details    Patient Position:  Sitting  Prep: ChloraPrep    Monitoring:  Cardiac monitor  Block Type:  Supraclavicular  Laterality:  Right  Injection Technique:  Single-shot    Needle    Needle Type:  Short-bevel  Needle Gauge:  22 G  Needle Length:  50 mm  Needle Localization:  Ultrasound guidance              Assessment    Injection Assessment:  Good spread noted, incremental injection, local visualized surrounding nerve on ultrasound, low pressure, negative aspiration for heme and no pain on injection  Heart Rate Change: No        Medications    ropivacaine (NAROPIN) injection 0.5% - Infiltration   30 mL - 12/16/2024 1:12:00 PM    Additional Comments    1 attempt. No complications noted. Pt tolerated procedure well.

## 2024-12-17 ENCOUNTER — TELEPHONE (OUTPATIENT)
Dept: ORTHOPEDICS CLINIC | Facility: CLINIC | Age: 49
End: 2024-12-17

## 2024-12-17 DIAGNOSIS — G47.00 INSOMNIA, UNSPECIFIED TYPE: ICD-10-CM

## 2024-12-17 RX ORDER — ZOLPIDEM TARTRATE 10 MG/1
10 TABLET ORAL NIGHTLY
Qty: 30 TABLET | Refills: 0 | Status: SHIPPED | OUTPATIENT
Start: 2024-12-17

## 2024-12-17 NOTE — TELEPHONE ENCOUNTER
Zolpidem 10 mg  Filled 11-11-24  Qty 30  0 refills  Future Appointments   Date Time Provider Department Center   12/26/2024  9:45 AM Fam Dorman MD [facility] [place]   2/3/2025  3:15 PM Mich Iglesias MD [facility] 12-2-24 TO

## 2024-12-17 NOTE — TELEPHONE ENCOUNTER
Patient scheduled on [facility] a post-op appt for 12/26. Already scheduled for 1/2. Does patient need to be seen on 12/26 or is 1/2 ok?   Future Appointments   Date Time Provider Department Center   12/26/2024  9:45 AM Fam Dorman MD [facility] [place]   1/2/2025  8:45 AM Fam Dorman MD [facility] [place]   2/3/2025  3:15 PM Mich Iglesias MD [facility] [place]

## 2024-12-17 NOTE — ANESTHESIA POSTPROCEDURE EVALUATION
Patient: Jace Panda    Procedure Summary       Date: 12/16/24 Room / Location: [facility] MAIN OR [address] [place] [address] MAIN OR    Anesthesia Start: 1305 Anesthesia Stop: 1535    Procedure: Right elbow open triceps tendon repair (Right: Elbow) Diagnosis:       Triceps tendon rupture, right, initial encounter      (Triceps tendon rupture, right, initial encounter [S46.311A])    Surgeons: Fam Dorman MD Anesthesiologist: Abby Jordan MD    Anesthesia Type: general ASA Status: 3            Anesthesia Type: general    Vitals Value Taken Time   /69 12/16/24 1623   Temp 98.9 °F (37.2 °C) 12/16/24 1610   Pulse 85 12/16/24 1623   Resp 18 12/16/24 1623   SpO2 94 % 12/16/24 1623       [facility] AN Post Evaluation:   Patient Evaluated in PACU  Patient Participation: complete - patient participated  Level of Consciousness: awake and alert  Pain Management: adequate  Airway Patency:patent  Dental exam unchanged from preop  Yes    Nausea/Vomiting: none  Cardiovascular Status: blood pressure returned to baseline, hemodynamically stable and acceptable  Respiratory Status: acceptable and spontaneous ventilation  Postoperative Hydration acceptable      Abby Jordan MD  12/17/2024 8:54 AM

## 2024-12-20 ENCOUNTER — TELEPHONE (OUTPATIENT)
Dept: ORTHOPEDICS CLINIC | Facility: CLINIC | Age: 49
End: 2024-12-20

## 2024-12-20 NOTE — TELEPHONE ENCOUNTER
Post-op call for Dr. Cruzito Haile    Date: 12/20/2024      Time: 4:00 PM   Patient: Jace Panda      number: [id number]   Surgery and surgery date:12/16/2024      Procedure Laterality Anesthesia   Right elbow open triceps tendon repair          Patient unavailable.  Left message on voicemail to call clinic with questions or concerns.  Number was provided./ SPOKE TO PATIENT  Patient's general feeling since discharge:/ OK, little itchy  Pain control (0-10):/ 2-3  Pain Medication:  dose/strength/  Medication Quantity Refills Start End   aspirin 81 MG Oral Tab EC         Medication Quantity Refills Start End   traMADol 50 MG Oral Tab         Medication Quantity Refills Start End   oxyCODONE 5 MG Oral Tab       Patient is only using advil now   Fever: no  Chills: no  SOB: no  Incision site appearance:  Redness no  Drainage no  Clean/dry/Intact yes  Calf pain, redness or warmth:   no   Bowel Regimen: yes  If not, what are you taking stool softener/laxative?  Confirmed appointment date for post op:/ 12/26/2024  Other concerns patients may ask: / Hand is swollen. I discussed new placement of ice, and protocol and keep fingers elevated  Bathing and bandages   Patient needs to keep incision clean and dry for the first week./DONE  Enforce rest, ice, compression elevation and use their pain medication accordingly./ Ice in the axilla using protocol  If the patient needs more pain medication, please put in a communication.

## 2024-12-23 ENCOUNTER — OFFICE VISIT (OUTPATIENT)
Dept: ORTHOPEDICS CLINIC | Facility: CLINIC | Age: 49
End: 2024-12-23
Payer: COMMERCIAL

## 2024-12-23 ENCOUNTER — TELEPHONE (OUTPATIENT)
Dept: ORTHOPEDICS CLINIC | Facility: CLINIC | Age: 49
End: 2024-12-23

## 2024-12-23 DIAGNOSIS — S46.311S RUPTURE OF RIGHT TRICEPS TENDON, SEQUELA: Primary | ICD-10-CM

## 2024-12-23 NOTE — PROGRESS NOTES
Name: Jace Panda   MRN: [id number]  Date: 12/23/2024     REASON FOR VISIT: Follow up for right triceps tendon open repair performed 12/16/2024 doing very well    INTERVAL HISTORY:   Jace Panda is a very pleasant 49 year old male who returns today now roughly 1 week status post right open triceps tendon repair performed 1216 doing very well.  Patient had been doing exceptionally well however his brace has become slightly loose.  He is here to remove the splint and placed a postoperative brace.  He denies any fevers chills or any evidence of infection.        ROS: ROS    PE:   There were no vitals filed for this visit.  Estimated body mass index is 34.72 kg/m² as calculated from the following:    Height as of 12/16/24: 5' 10\" (1.778 m).    Weight as of 12/16/24: 242 lb (109.8 kg).    Physical Exam  Constitutional:       Appearance: Normal appearance.   HENT:      Head: Normocephalic and atraumatic.   Eyes:      Extraocular Movements: Extraocular movements intact.   Neck:      Musculoskeletal: Normal range of motion and neck supple.   Cardiovascular:      Pulses: Normal pulses.   Pulmonary:      Effort: Pulmonary effort is normal. No respiratory distress.   Abdominal:      General: There is no distension.   Skin:     General: Skin is warm.      Capillary Refill: Capillary refill takes less than 2 seconds.      Findings: No bruising.   Neurological:      General: No focal deficit present.      Mental Status: She is alert.   Psychiatric:         Mood and Affect: Mood normal.     Examination of the incision demonstrates evidence of appropriate healing no evidence of any wound issues    Patient is able to gently extend the arm against limited resistance    No obvious peripheral edema noted.   Distal neurovascular exam demonstrates normal perfusion, intact sensation to light touch and full strength.     Examination of the contralateral knee demonstrates:  No significant atrophy, swelling or effusion. Full range  of motion. Neurovascularly intact distally.      Radiographic Examination/Diagnostics:  I personally viewed, independently interpreted and radiology report was reviewed.    MRI ELBOW, RIGHT (AEB=31099)    Result Date: 12/4/2024  PROCEDURE:  MRI ELBOW, RIGHT (CPT=73221)  COMPARISON:  None.  INDICATIONS:  S46.311A Rupture of right triceps tendon, initial encounter  TECHNIQUE:  Multiplanar imaging of the elbow is acquired including axial, sagittal and coronal imaging as needed. Proton density, fluid sensitive and fat suppression sequences are performed. Additional imaging was performed as needed.  Examination was performed without contrast.   PATIENT STATED HISTORY: (As transcribed by Technologist)   Rupture of right triceps tendon    FINDINGS:  TENDONS EXTENSOR:  Severe tendinopathy with suspected high-grade partial tear of the anterior fibers of the common extensor tendon origin (series 11, image 21).  FLEXOR:  Suspected high-grade tear of the common flexor tendon origin, supported by adjacent hematoma, additionally with mild/moderate intramuscular edema of the pronator teres and flexor digitorum superficialis OTHER:  Rupture of the distal triceps tendon involving the full width of the distal tendon at the olecranon insertion.  There may be a small associated cortical avulsion injury of the olecranon process (series 12, image 16).  There is a fluid signal gap at the site of tear measuring 3 mm (series 11, image 27).  Complex collection extending into the adjacent ulnar soft tissues most in keeping with hematoma measuring approximately 5 x 4 x 4 cm.  There is extensive edema within and surrounding the torn tendon with edematous changes extending into the distal triceps muscle belly involving all heads.  Extensive subcutaneous edema circumferentially about the elbow.  LIGAMENTS RADIAL:  No tear of the radial collateral, lateral ulnar collateral, or annular ligaments.  ULNAR:  No well-defined ligament tear, though the  humeral attachment of the ligament is suboptimally visualized secondary to regional edema/hematoma.  MUSCLES:  See above description BONES:  No acute fracture or malalignment.  No significant elbow arthropathy. JOINT CAPSULE:  No synovitis or intra-articular bodies.  CUBITAL TUNNEL:  Normal caliber and signal intensity of the ulnar nerve.  No mass lesions.            CONCLUSION:   1. Rupture of the distal triceps tendon involving the full tendon width at the footplate insertion at the olecranon.  Tendon retraction of approximately 3 mm.  Extensive edema within and adjacent to the torn tendon extends proximally to the distal triceps muscle belly involving all heads.   2. Suspected high-grade tear of the common flexor tendon origin.  This is supported by mild/moderate intramuscular edema involving the pronator teres and flexor digitorum superficialis.   3. Suspected high-grade tear of the anterior fibers of the common extensor tendon origin.  4. Extensive subcutaneous edema circumferentially about the elbow.    LOCATION:  [facility]   Dictated by (CST): Sandra Cary MD on 12/04/2024 at 3:26 PM     Finalized by (CST): Sandra Cary MD on 12/04/2024 at 3:44 PM       CTA CAROTID ARTERIES (CPT=70498)    Result Date: 12/3/2024  PROCEDURE:  CTA CAROTID ARTERIES (CPT=70498)  COMPARISON:  None.  INDICATIONS:  405 barbell dropped on throat, right elbow blew  TECHNIQUE:  Contrast-enhanced multislice CT angiography of the neck vasculature from AP window to Sella was performed using nonionic contrast. 3D volume rendering images were obtained by the technologist as well as the radiologist on an independent workstation. Multiplanar 3D reformatted imaging including multiplanar MIP images were obtained. Curved planar reformats were performed through the carotid and vertebral arteries. All measurements obtained in this exam were performed using NASCET criteria. Dose reduction techniques were used. Dose information is transmitted to the ACR  (American College of Radiology) NRDR (National Radiology Data Registry) which includes the Dose Index Registry.   PATIENT STATED HISTORY:(As transcribed by Technologist)  405 barbell dropped on throat.   CONTRAST USED:  75cc of Isovue 370  FINDINGS:  AORTIC ARCH:                       Unremarkable INNOMINATE ARTERY:                       Unremarkable  RIGHT SUBCLAVIAN ARTERY:    No hemodynamically significant stenosis or dissection. COMMON CAROTID:    No hemodynamically significant stenosis or dissection. CAROTID BULB:    No hemodynamically significant stenosis or dissection. INTERNAL CAROTID:    No hemodynamically significant stenosis or dissection. EXTERNAL CAROTID:    No hemodynamically significant stenosis or dissection. VERTEBRAL:    No hemodynamically significant stenosis or dissection.  LEFT SUBCLAVIAN ARTERY:    No hemodynamically significant stenosis or dissection. COMMON CAROTID:    No hemodynamically significant stenosis or dissection. CAROTID BULB:                 No hemodynamically significant stenosis or dissection. INTERNAL CAROTID:   No hemodynamically significant stenosis or dissection. EXTERNAL CAROTID:   No hemodynamically significant stenosis or dissection. VERTEBRAL:   No hemodynamically significant stenosis or dissection.  BONES:    No bony lesion or fracture.  LUNG APICES:    No visible pulmonary or pleural disease.  OTHER:  The visualized soft tissues of the neck are also unremarkable.             CONCLUSION:  No acute abnormality on CT angiography of the neck.   LOCATION:  [facility]   Dictated by (CST): Scottie Rocha MD on 12/03/2024 at 5:23 PM     Finalized by (CST): Scottie Rocha MD on 12/03/2024 at 5:28 PM       XR ELBOW, COMPLETE (MIN 3 VIEWS), RIGHT (CPT=73080)    Result Date: 12/3/2024  PROCEDURE:  XR ELBOW, COMPLETE (MIN 3 VIEWS), RIGHT (CPT=73080)  TECHNIQUE:  Three views were obtained.  COMPARISON:  None.  INDICATIONS:  405 barbell dropped on throat, right elbow blew  PATIENT STATED  HISTORY: (As transcribed by Technologist)   shahnaz dropped on throat, right elbow blew.    FINDINGS:  No acute fractures or osseous lesions are identified.  Radial capitellar relationship is within normal limits.  No significant elbow joint effusion.  Well corticated ossific density adjacent to the lateral epicondyle.  Soft tissue swelling.            CONCLUSION:  No acute fractures.  Mild soft tissue swelling along the dorsal aspect of the elbow.   LOCATION:  [id number]    Dictated by (CST): Elzbieta Villarreal MD on 12/03/2024 at 3:56 PM     Finalized by (CST): Elzbieta Villarreal MD on 12/03/2024 at 3:57 PM         IMPRESSION: Jace Panda is a 49 year old male who presents now roughly 1 week status post open repair of right distal triceps rupture.  Patient doing extremely well    PLAN:   We discussed with the patient at this point he is doing extremely well.  He is in his functional brace and will continue physical therapy with the appropriately outlined parameters.  We explained to the patient we will see him in 2 weeks and we will progress from there.  All questions answered at today's visit.      FOLLOW-UP:   Return to clinic in 2 weeks for repeat clinical exam

## 2024-12-23 NOTE — TELEPHONE ENCOUNTER
Patient called regarding last weeks surgery. The wrap that was put on is falling off and patient would like to come in to get a new wrap. Please advise

## 2024-12-23 NOTE — TELEPHONE ENCOUNTER
Spoke with patient, his splint is coming loose and causing pain, wanted to come in to have it rewrapped/adjusted. Scheduled patient for 11:30 am today

## 2024-12-30 ENCOUNTER — TELEPHONE (OUTPATIENT)
Dept: ORTHOPEDICS CLINIC | Facility: CLINIC | Age: 49
End: 2024-12-30

## 2024-12-30 NOTE — TELEPHONE ENCOUNTER
[facility] needs the patients script sent over, there is not one in the patients chart. Also any protocols that he may have. Please advise and fax to [phone]

## 2025-01-03 NOTE — TELEPHONE ENCOUNTER
Aspirin 81 mg  DOS: 12/16/24  Last OV: 12/23/24  Last refill date: 12/16/24     #/refills: 21/0  Upcoming appt:   Future Appointments   Date Time Provider Department Center   1/6/2025 11:45 AM Fam Dorman MD [facility] [place]   2/3/2025  3:15 PM Mich Iglesias MD [facility] [place]     Is this therapy completed?

## 2025-01-03 NOTE — TELEPHONE ENCOUNTER
S/p Right elbow open tricep tendon repair 12/16/24  LOV 12/23/24 - first post op visit    Future Appointments   Date Time Provider Department Center   1/6/2025 11:45 AM Fam Dorman MD [facility] [place]

## 2025-01-03 NOTE — TELEPHONE ENCOUNTER
[facility] called back and stated that the provider sent order over directly and stated to disregard previous message to provider.

## 2025-01-06 ENCOUNTER — OFFICE VISIT (OUTPATIENT)
Dept: ORTHOPEDICS CLINIC | Facility: CLINIC | Age: 50
End: 2025-01-06
Payer: COMMERCIAL

## 2025-01-06 ENCOUNTER — TELEPHONE (OUTPATIENT)
Dept: ORTHOPEDICS CLINIC | Facility: CLINIC | Age: 50
End: 2025-01-06

## 2025-01-06 DIAGNOSIS — S46.311S RUPTURE OF RIGHT TRICEPS TENDON, SEQUELA: Primary | ICD-10-CM

## 2025-01-06 NOTE — TELEPHONE ENCOUNTER
Patient dropped off STD forms at the [place] location. Emailed and sent interoffice to forms department.

## 2025-01-06 NOTE — PROGRESS NOTES
NURSING INTAKE COMMENTS:   Chief Complaint   Patient presents with    Post-Op     S/p R elbow f/u - had sx on 12/16/24 - states he is good - has some soreness in his arm - wearing the brace - sometimes has a little numbness and tingling in the thumb - also has some swelling when he moves his arm more        HPI: This 49 year old male presents today now roughly 2 weeks status post right elbow open triceps tendon repair performed 12/16/2024 doing very well.    Past Medical History:    Back problem    Cancer (HCC)    testicular ca    Carpal tunnel syndrome on both sides    hands, R hand repaired through surgery, L hand unrepaired    Cellulitis    arm    Diabetes (HCC)    Elevated blood sugar    Essential hypertension, benign    High blood pressure    Low testosterone    Unspecified essential hypertension    Visual impairment    readers     Past Surgical History:   Procedure Laterality Date    Back surgery  10/01/2011    Lumbar area, treated with needle and heat 10/11    Hernia surgery      umbilical    Orchiectomy   Left 10/01/2016    [facility]    s/p radiation       Other surgical history      R carpal, Neuroplasty Decompression Median Nerve  At Carpal tunnel     Tonsillectomy      As a child    Vasectomy  10/01/2016    [place] [facility]     Current Outpatient Medications   Medication Sig Dispense Refill    ZOLPIDEM 10 MG Oral Tab TAKE 1 TABLET BY MOUTH EVERY DAY AT NIGHT 30 tablet 0    aspirin 81 MG Oral Tab EC Take 1 tablet (81 mg total) by mouth daily. 21 tablet 0    traMADol 50 MG Oral Tab Take 1 tablet (50 mg total) by mouth every 6 (six) hours as needed for Pain. No alcohol or driving on this med. Stop if lethargic or hallucinating. 20 tablet 0    oxyCODONE 5 MG Oral Tab Take 1 tablet (5 mg total) by mouth every 4 (four) hours as needed for Pain. 20 tablet 0    ondansetron (ZOFRAN) 4 mg tablet Take 1 tablet (4 mg total) by mouth every 8 (eight) hours as needed for Nausea. 20 tablet 0    docusate sodium 100 MG Oral  Cap Take 1 capsule (100 mg total) by mouth 2 (two) times daily. 20 capsule 0    naproxen 500 MG Oral Tab Take 1 tablet (500 mg total) by mouth 2 (two) times daily as needed. 20 tablet 0    Naloxone HCl 4 MG/0.1ML Nasal Liquid 4 mg by Intranasal route as needed (May repeat as needed in other nostril if symptoms persist). If patient remains unresponsive, repeat dose in other nostril 2-5 minutes after first dose. 1 kit 0    Tirzepatide (MOUNJARO) 5 MG/0.5ML Subcutaneous Solution Auto-injector Inject 5 mg into the skin once a week. 2 mL 1    hydroCHLOROthiazide 25 MG Oral Tab Take 1 tablet (25 mg total) by mouth After lunch.      PANTOPRAZOLE 40 MG Oral Tab EC TAKE 1 TABLET BY MOUTH BEFORE BREAKFAST (Patient taking differently: Take 1 tablet (40 mg total) by mouth daily as needed.) 90 tablet 0    CANDESARTAN 32 MG Oral Tab TAKE 1 TABLET BY MOUTH DAILY. (Patient taking differently: Take 1 tablet (32 mg total) by mouth After lunch.) 90 tablet 0    amLODIPine 5 MG Oral Tab Take 1 tablet (5 mg total) by mouth daily. (Patient taking differently: Take 1 tablet (5 mg total) by mouth After lunch.) 90 tablet 0    Testosterone cypionate (DEPOTESTOTERONE) 200 MG/ML Intramuscular Solution Inject 0.5 mL (100 mg total) into the muscle See Admin Instructions. Every 10 days.       Allergies[1]  Family History   Problem Relation Age of Onset    Hypertension Father        Social History     Occupational History    Not on file   Tobacco Use    Smoking status: Every Day     Current packs/day: 1.00     Average packs/day: 1 pack/day for 1 year (1.0 ttl pk-yrs)     Types: Cigarettes    Smokeless tobacco: Never    Tobacco comments:     started again 2020, previously Quit 12/2009 1 1/2 ppd since 1991. Unspecified tabacco product   Vaping Use    Vaping status: Never Used   Substance and Sexual Activity    Alcohol use: Not Currently     Comment: social    Drug use: Yes     Frequency: 1.0 times per week     Types: Cannabis    Sexual activity: Not  on file        Review of Systems:  GENERAL: denies fevers, chills, night sweats, fatigue, unintentional weight loss/gain  SKIN: denies skin lesions, open sores, rash  HEENT:denies recent vision change, new nasal congestion,hearing loss, tinnitus, sore throat, headaches  RESPIRATORY: denies new shortness of breath, cough, asthma, wheezing  CARDIOVASCULAR: denies chest pain, leg cramps with exertion, palpitations, leg swelling  GI: denies abdominal pain, nausea, vomiting, diarrhea, constipation, hematochezia, worsening heartburn or stomach ulcers  : denies dysuria, hematuria, incontinence, increased frequency, urgency, difficulty urinating  MUSCULOSKELETAL: denies musculoskeletal complaints other than in HPI  NEURO: denies numbness, tingling, weakness, balance issues, dizziness, memory loss  PSYCHIATRIC: denies Hx of depression, anxiety, other psychiatric disorders  HEMATOLOGIC: denies blood clots, anemia, blood clotting disorders, blood transfusion  ENDOCRINE: denies autoimmune disease, thyroid issues, or diabetes  ALLERGY: denies asthma, seasonal allergies    Physical Examination:    There were no vitals taken for this visit.  Constitutional: appears well hydrated, alert and responsive, no acute distress noted      Right elbow exam  Range of motion roughly 45 to 80 degrees.  Active strength assessed.  Incision well-healed no erythema no ecchymosis.  Tendon palpable.        Imaging:   Imaging was independently reviewed and interpreted by attending physician  No results found.     Labs:  Lab Results   Component Value Date    WBC 10.8 12/03/2024    HGB 17.2 12/03/2024    .0 12/03/2024      Lab Results   Component Value Date     (H) 12/03/2024    BUN 18 12/03/2024    CREATSERUM 1.17 12/03/2024    GFR 63 04/08/2016    GFRNAA 63 05/07/2022    GFRAA 73 05/07/2022        Assessment and Plan:  There are no diagnoses linked to this encounter.    Assessment: Very pleasant 49-year-old male now roughly 2 weeks  status post open triceps tendon repair performed 12/16/2024 doing extremely well.    Plan: Patient presents now roughly 2-week status post open triceps tendon repair performed 12/16/2024 doing very well.  He will follow-up in 4 weeks at that point we expect his range of motion to be improved.  His incision is well-healed and he is on track to do very well.  All questions answered at today's visit.    Follow Up: No follow-ups on file.    Fam Dorman MD             [1]   Allergies  Allergen Reactions    Ace Inhibitors Coughing    Hydrocodone OTHER (SEE COMMENTS)     nausea

## 2025-01-07 RX ORDER — ASPIRIN 81 MG/1
81 TABLET, COATED ORAL DAILY
Qty: 21 TABLET | Refills: 0 | Status: SHIPPED | OUTPATIENT
Start: 2025-01-07

## 2025-01-08 NOTE — TELEPHONE ENCOUNTER
Disability form received and logged for processing. No Release of Information     Called patient and details were provided. Patient request completed Form Uploaded to [employer]    Type of Leave: disability   Reason for Leave: Sx  Start date of leave: 12/5/24  End date of leave: 2/6/25 pending re eval  How many flare ups per month/length?:  How many appts per month/length?:   Was Fee and Turnaround info Given?: Yes

## 2025-01-10 NOTE — TELEPHONE ENCOUNTER
Dr. Dorman    Please sign off on form if you agree to: disability     -Signature page will be the first page scanned  -From your Inbasket, Highlight the patient and click Chart   -Double click the 1/6/25 Forms Completion telephone encounter  -Scroll down to the Media section   -Click the blue Hyperlink: disability  Dr. Dorman 1/10/25  -Click Acknowledge located in the top right ribbon/menu   -Drag the mouse into the blank space of the document and a + sign will appear. Left click to   electronically sign the document.  -Once signed, simply exit out of the screen and you signature will be saved.     Thank you,    Yovani MORA

## 2025-01-13 RX ORDER — TIRZEPATIDE 5 MG/.5ML
5 INJECTION, SOLUTION SUBCUTANEOUS WEEKLY
Qty: 2 ML | Refills: 0 | Status: SHIPPED | OUTPATIENT
Start: 2025-01-13

## 2025-01-13 NOTE — TELEPHONE ENCOUNTER
Requesting    Tirzepatide (MOUNJARO) 5 MG/0.5ML Subcutaneous Solution Auto-injector         Sig: Inject 5 mg into the skin once a week.    Disp: 2 mL    Refills: 1    Start: 1/11/2025    Class: Normal    Non-formulary    Last ordered: 1 month ago (12/2/2024) by Mich Iglesias MD    Diabetes Medication Protocol [id number]/11/2025 01:59 PM   Protocol Details Last A1C < 7.5 and within past 6 months    In person appointment or virtual visit in the past 6 mos or appointment in next 3 mos    Microalbumin procedure in past 12 months or taking ACE/ARB    EGFRCR or GFRNAA > 50    GFR in the past 12 months    Medication is active on med list        LOV: 12/2/2024  RTC: Feb 2025  Last Relevant Labs: 7/18/2024  Filled: 12/2/2024 #2 mL with 1 refills    Future Appointments   Date Time Provider Department Center   2/3/2025  3:15 PM Mich Iglesias MD [facility] [place]   2/6/2025  3:30 PM Fam Dorman MD [facility] [place]

## 2025-01-18 DIAGNOSIS — I10 ESSENTIAL HYPERTENSION, BENIGN: ICD-10-CM

## 2025-01-18 DIAGNOSIS — I10 ESSENTIAL (PRIMARY) HYPERTENSION: ICD-10-CM

## 2025-01-19 RX ORDER — HYDROCHLOROTHIAZIDE 25 MG/1
25 TABLET ORAL DAILY
Qty: 90 TABLET | Refills: 0 | Status: SHIPPED | OUTPATIENT
Start: 2025-01-19

## 2025-01-19 RX ORDER — CANDESARTAN 32 MG/1
32 TABLET ORAL DAILY
Qty: 90 TABLET | Refills: 0 | Status: SHIPPED | OUTPATIENT
Start: 2025-01-19

## 2025-01-23 DIAGNOSIS — G47.00 INSOMNIA, UNSPECIFIED TYPE: ICD-10-CM

## 2025-01-23 NOTE — TELEPHONE ENCOUNTER
Requesting    zolpidem 10 MG Oral Tab         Sig: Take 1 tablet (10 mg total) by mouth nightly.    Disp: 30 tablet    Refills: 0    Start: 1/23/2025    Class: Normal    Non-formulary For: Insomnia, unspecified type    To pharmacy: Not to exceed 5 additional fills before 05/10/2025    Last ordered: 1 month ago (12/17/2024) by Mich Iglesias MD    Controlled Substance Medication [id number]/23/2025 11:38 AM    This medication is a controlled substance - forward to provider to refill    Medication is active on med list        LOV: 12/2/2024  RTC: Feb 2025  Last Relevant Labs: n/a   Filled: 12/17/2024 #30 with 0 refills    Future Appointments   Date Time Provider Department Center   2/3/2025  3:15 PM Mich Iglesias MD [facility] [place]   2/6/2025  3:30 PM Fam Dorman MD [facility] [place]

## 2025-01-24 ENCOUNTER — LAB ENCOUNTER (OUTPATIENT)
Dept: LAB | Age: 50
End: 2025-01-24
Attending: FAMILY MEDICINE
Payer: COMMERCIAL

## 2025-01-24 DIAGNOSIS — Z00.00 LABORATORY EXAMINATION ORDERED AS PART OF A ROUTINE GENERAL MEDICAL EXAMINATION: ICD-10-CM

## 2025-01-24 LAB
ALBUMIN SERPL-MCNC: 4.7 G/DL (ref 3.2–4.8)
ALBUMIN/GLOB SERPL: 1.8 {RATIO} (ref 1–2)
ALP LIVER SERPL-CCNC: 98 U/L
ALT SERPL-CCNC: 22 U/L
ANION GAP SERPL CALC-SCNC: 11 MMOL/L (ref 0–18)
AST SERPL-CCNC: 22 U/L (ref ?–34)
BASOPHILS # BLD AUTO: 0.11 X10(3) UL (ref 0–0.2)
BASOPHILS NFR BLD AUTO: 1.2 %
BILIRUB SERPL-MCNC: 0.8 MG/DL (ref 0.3–1.2)
BILIRUB UR QL STRIP.AUTO: NEGATIVE
BUN BLD-MCNC: 18 MG/DL (ref 9–23)
CALCIUM BLD-MCNC: 9.5 MG/DL (ref 8.7–10.6)
CHLORIDE SERPL-SCNC: 101 MMOL/L (ref 98–112)
CHOLEST SERPL-MCNC: 167 MG/DL (ref ?–200)
CLARITY UR REFRACT.AUTO: CLEAR
CO2 SERPL-SCNC: 25 MMOL/L (ref 21–32)
COLOR UR AUTO: YELLOW
CREAT BLD-MCNC: 1.13 MG/DL
EGFRCR SERPLBLD CKD-EPI 2021: 79 ML/MIN/1.73M2 (ref 60–?)
EOSINOPHIL # BLD AUTO: 0.31 X10(3) UL (ref 0–0.7)
EOSINOPHIL NFR BLD AUTO: 3.4 %
ERYTHROCYTE [DISTWIDTH] IN BLOOD BY AUTOMATED COUNT: 13.2 %
EST. AVERAGE GLUCOSE BLD GHB EST-MCNC: 123 MG/DL (ref 68–126)
FASTING PATIENT LIPID ANSWER: YES
FASTING STATUS PATIENT QL REPORTED: YES
GLOBULIN PLAS-MCNC: 2.6 G/DL (ref 2–3.5)
GLUCOSE BLD-MCNC: 88 MG/DL (ref 70–99)
GLUCOSE UR STRIP.AUTO-MCNC: NEGATIVE MG/DL
GRAN CASTS #/AREA URNS LPF: PRESENT /LPF
GRAN CASTS #/AREA URNS LPF: PRESENT /LPF
HBA1C MFR BLD: 5.9 % (ref ?–5.7)
HCT VFR BLD AUTO: 53.1 %
HDLC SERPL-MCNC: 41 MG/DL (ref 40–59)
HGB BLD-MCNC: 18.3 G/DL
HYALINE CASTS #/AREA URNS AUTO: PRESENT /LPF
HYALINE CASTS #/AREA URNS AUTO: PRESENT /LPF
IMM GRANULOCYTES # BLD AUTO: 0.06 X10(3) UL (ref 0–1)
IMM GRANULOCYTES NFR BLD: 0.7 %
KETONES UR STRIP.AUTO-MCNC: NEGATIVE MG/DL
LDLC SERPL CALC-MCNC: 103 MG/DL (ref ?–100)
LEUKOCYTE ESTERASE UR QL STRIP.AUTO: NEGATIVE
LYMPHOCYTES # BLD AUTO: 1.47 X10(3) UL (ref 1–4)
LYMPHOCYTES NFR BLD AUTO: 16.3 %
MCH RBC QN AUTO: 30 PG (ref 26–34)
MCHC RBC AUTO-ENTMCNC: 34.5 G/DL (ref 31–37)
MCV RBC AUTO: 87.2 FL
MONOCYTES # BLD AUTO: 0.73 X10(3) UL (ref 0.1–1)
MONOCYTES NFR BLD AUTO: 8.1 %
NEUTROPHILS # BLD AUTO: 6.34 X10 (3) UL (ref 1.5–7.7)
NEUTROPHILS # BLD AUTO: 6.34 X10(3) UL (ref 1.5–7.7)
NEUTROPHILS NFR BLD AUTO: 70.3 %
NITRITE UR QL STRIP.AUTO: NEGATIVE
NONHDLC SERPL-MCNC: 126 MG/DL (ref ?–130)
OSMOLALITY SERPL CALC.SUM OF ELEC: 285 MOSM/KG (ref 275–295)
PH UR STRIP.AUTO: 6 [PH] (ref 5–8)
PLATELET # BLD AUTO: 288 10(3)UL (ref 150–450)
POTASSIUM SERPL-SCNC: 3.6 MMOL/L (ref 3.5–5.1)
PROT SERPL-MCNC: 7.3 G/DL (ref 5.7–8.2)
RBC # BLD AUTO: 6.09 X10(6)UL
SODIUM SERPL-SCNC: 137 MMOL/L (ref 136–145)
SP GR UR STRIP.AUTO: 1.02 (ref 1–1.03)
TRIGL SERPL-MCNC: 131 MG/DL (ref 30–149)
TSI SER-ACNC: 1.12 UIU/ML (ref 0.55–4.78)
UROBILINOGEN UR STRIP.AUTO-MCNC: 1 MG/DL
VLDLC SERPL CALC-MCNC: 22 MG/DL (ref 0–30)
WBC # BLD AUTO: 9 X10(3) UL (ref 4–11)

## 2025-01-24 PROCEDURE — 80053 COMPREHEN METABOLIC PANEL: CPT

## 2025-01-24 PROCEDURE — 84443 ASSAY THYROID STIM HORMONE: CPT

## 2025-01-24 PROCEDURE — 80061 LIPID PANEL: CPT

## 2025-01-24 PROCEDURE — 85025 COMPLETE CBC W/AUTO DIFF WBC: CPT

## 2025-01-24 PROCEDURE — 81001 URINALYSIS AUTO W/SCOPE: CPT

## 2025-01-24 PROCEDURE — 81015 MICROSCOPIC EXAM OF URINE: CPT

## 2025-01-24 PROCEDURE — 36415 COLL VENOUS BLD VENIPUNCTURE: CPT

## 2025-01-24 PROCEDURE — 83036 HEMOGLOBIN GLYCOSYLATED A1C: CPT

## 2025-01-24 RX ORDER — ZOLPIDEM TARTRATE 10 MG/1
10 TABLET ORAL NIGHTLY
Qty: 30 TABLET | Refills: 0 | Status: SHIPPED | OUTPATIENT
Start: 2025-01-24

## 2025-02-03 ENCOUNTER — OFFICE VISIT (OUTPATIENT)
Dept: INTERNAL MEDICINE CLINIC | Facility: CLINIC | Age: 50
End: 2025-02-03
Payer: COMMERCIAL

## 2025-02-03 VITALS
OXYGEN SATURATION: 98 % | TEMPERATURE: 99 F | WEIGHT: 238.63 LBS | DIASTOLIC BLOOD PRESSURE: 82 MMHG | SYSTOLIC BLOOD PRESSURE: 128 MMHG | HEIGHT: 70 IN | BODY MASS INDEX: 34.16 KG/M2 | HEART RATE: 76 BPM

## 2025-02-03 DIAGNOSIS — E11.9 TYPE 2 DIABETES MELLITUS WITHOUT COMPLICATION, WITHOUT LONG-TERM CURRENT USE OF INSULIN (HCC): ICD-10-CM

## 2025-02-03 DIAGNOSIS — I10 ESSENTIAL HYPERTENSION, BENIGN: Primary | ICD-10-CM

## 2025-02-03 PROCEDURE — 3074F SYST BP LT 130 MM HG: CPT | Performed by: FAMILY MEDICINE

## 2025-02-03 PROCEDURE — 99213 OFFICE O/P EST LOW 20 MIN: CPT | Performed by: FAMILY MEDICINE

## 2025-02-03 PROCEDURE — 3008F BODY MASS INDEX DOCD: CPT | Performed by: FAMILY MEDICINE

## 2025-02-03 PROCEDURE — 3079F DIAST BP 80-89 MM HG: CPT | Performed by: FAMILY MEDICINE

## 2025-02-03 PROCEDURE — 3044F HG A1C LEVEL LT 7.0%: CPT | Performed by: FAMILY MEDICINE

## 2025-02-03 NOTE — PROGRESS NOTES
Jace Panda is a 49 year old male.  HPI:   Here to f/u on the weight    overall doing well    Had to hold the mounjaro d/t his sx on the R elbow      It went quite well    has PT on it now   feeling better w more ROM      Breathing is good   no CP    No issues w mounjaro     would like to stay on it        Current Outpatient Medications   Medication Sig Dispense Refill    zolpidem 10 MG Oral Tab Take 1 tablet (10 mg total) by mouth nightly. 30 tablet 0    HYDROCHLOROTHIAZIDE 25 MG Oral Tab TAKE 1 TABLET (25 MG TOTAL) BY MOUTH DAILY. 90 tablet 0    CANDESARTAN 32 MG Oral Tab TAKE 1 TABLET BY MOUTH DAILY. 90 tablet 0    Tirzepatide (MOUNJARO) 5 MG/0.5ML Subcutaneous Solution Auto-injector Inject 5 mg into the skin once a week. 2 mL 0    ASPIRIN LOW DOSE 81 MG Oral Tab EC TAKE 1 TABLET BY MOUTH EVERY DAY 21 tablet 0    PANTOPRAZOLE 40 MG Oral Tab EC TAKE 1 TABLET BY MOUTH BEFORE BREAKFAST (Patient taking differently: Take 1 tablet (40 mg total) by mouth daily as needed.) 90 tablet 0    amLODIPine 5 MG Oral Tab Take 1 tablet (5 mg total) by mouth daily. (Patient taking differently: Take 1 tablet (5 mg total) by mouth After lunch.) 90 tablet 0    Testosterone cypionate (DEPOTESTOTERONE) 200 MG/ML Intramuscular Solution Inject 0.5 mL (100 mg total) into the muscle See Admin Instructions. Every 10 days.        Past Medical History:    Back problem    Cancer (HCC)    testicular ca    Carpal tunnel syndrome on both sides    hands, R hand repaired through surgery, L hand unrepaired    Cellulitis    arm    Diabetes (HCC)    Elevated blood sugar    Essential hypertension, benign    High blood pressure    Low testosterone    Unspecified essential hypertension    Visual impairment    readers      Social History:  Social History     Socioeconomic History    Marital status:    Tobacco Use    Smoking status: Every Day     Current packs/day: 1.00     Average packs/day: 1 pack/day for 1 year (1.0 ttl pk-yrs)     Types:  Cigarettes    Smokeless tobacco: Never    Tobacco comments:     started again 2020, previously Quit 12/2009 1 1/2 ppd since 1991. Unspecified tabacco product   Vaping Use    Vaping status: Never Used   Substance and Sexual Activity    Alcohol use: Not Currently     Comment: social    Drug use: Yes     Frequency: 1.0 times per week     Types: Cannabis   Other Topics Concern    Caffeine Concern Yes     Comment: 3-4 cans of big red bulls daily and 8 cans of pop daily.     Exercise Yes     Comment: 6x/week.         REVIEW OF SYSTEMS:   GENERAL HEALTH: feels well otherwise  RESPIRATORY: denies shortness of breath  CARDIOVASCULAR: denies chest pain   GI: denies abdominal pain  NEURO: denies headaches    EXAM:   /82   Pulse 76   Temp 98.8 °F (37.1 °C) (Temporal)   Ht 5' 10\" (1.778 m)   Wt 238 lb 9.6 oz (108.2 kg)   SpO2 98%   BMI 34.24 kg/m²   GENERAL: well developed, well nourished,in no apparent distress  SKIN: no rashes  NECK: supple,no adenopathy  LUNGS: clear to auscultation  CARDIO: RRR without murmur  EXTREMITIES: no edema    ASSESSMENT AND PLAN:   1. Essential hypertension, benign  BP good   CPM w meds        2. Type 2 diabetes mellitus without complication, without long-term current use of insulin (HCC)  Doing well w mounjaro    last labs looked quite good     will stay on the 5mg and kwaku in late April     pleased w weight loss         The patient indicates understanding of these issues and agrees to the plan.  .

## 2025-02-06 ENCOUNTER — OFFICE VISIT (OUTPATIENT)
Dept: ORTHOPEDICS CLINIC | Facility: CLINIC | Age: 50
End: 2025-02-06
Payer: COMMERCIAL

## 2025-02-06 DIAGNOSIS — S46.311S RUPTURE OF RIGHT TRICEPS TENDON, SEQUELA: Primary | ICD-10-CM

## 2025-02-06 PROCEDURE — 99024 POSTOP FOLLOW-UP VISIT: CPT | Performed by: STUDENT IN AN ORGANIZED HEALTH CARE EDUCATION/TRAINING PROGRAM

## 2025-02-06 NOTE — PROGRESS NOTES
Name: Jace Panda   MRN: [id number]  Date: 2/6/2025     REASON FOR VISIT: Follow up for right distal triceps tendon repair performed 12/16/2024    INTERVAL HISTORY:   Jace Panda is a very pleasant 49 year old male who returns today for continued evaluation, consultation, and management of right distal biceps tendon repair performed 12/16/2024 doing extremely well.  Patient reports that he has been in physical therapy.  He feels his range of motion is complete and is not restricted.  He does endorse some weakness but reports steady improvement.  He has not returned to work yet.    ROS: ROS    PE:   There were no vitals filed for this visit.  Estimated body mass index is 34.24 kg/m² as calculated from the following:    Height as of 2/3/25: 5' 10\" (1.778 m).    Weight as of 2/3/25: 238 lb 9.6 oz (108.2 kg).    Physical Exam  Constitutional:       Appearance: Normal appearance.   HENT:      Head: Normocephalic and atraumatic.   Eyes:      Extraocular Movements: Extraocular movements intact.   Neck:      Musculoskeletal: Normal range of motion and neck supple.   Cardiovascular:      Pulses: Normal pulses.   Pulmonary:      Effort: Pulmonary effort is normal. No respiratory distress.   Abdominal:      General: There is no distension.   Skin:     General: Skin is warm.      Capillary Refill: Capillary refill takes less than 2 seconds.      Findings: No bruising.   Neurological:      General: No focal deficit present.      Mental Status: She is alert.   Psychiatric:         Mood and Affect: Mood normal.     Right elbow demonstrates    Well-healed incisions, full active and passive range of motion, able to extend triceps against resistance, incision pristine      Radiographic Examination/Diagnostics:  I personally viewed, independently interpreted and radiology report was reviewed.    No results found.    IMPRESSION: Jace Panda is a 49 year old male who presents now roughly 6 weeks status post right open  triceps tendon repair performed 12/16/2024 doing very well    PLAN:   We had a long discussion with the patient he is doing very well.  He will continue physical therapy and we will progress into strengthening over the next several weeks.  He will follow-up in 6 weeks for repeat clinical exam.  He also may return to work over the next several weeks with restrictions of no lifting over 20 pounds.  All questions answered at today's visit    FOLLOW-UP:   Return to clinic in six weeks. No imaging required at next visit.

## 2025-02-07 RX ORDER — AMLODIPINE BESYLATE 5 MG/1
5 TABLET ORAL DAILY
Qty: 90 TABLET | Refills: 0 | Status: SHIPPED | OUTPATIENT
Start: 2025-02-07

## 2025-02-07 NOTE — TELEPHONE ENCOUNTER
Requesting    Name from pharmacy: AMLODIPINE BESYLATE 5 MG TAB         Will file in chart as: AMLODIPINE 5 MG Oral Tab    Sig: Take 1 tablet (5 mg total) by mouth daily.    Disp: 90 tablet    Refills: 0 (Pharmacy requested: Not specified)    Start: 2/7/2025    Class: Normal    Last ordered: 5 months ago (8/19/2024) by Mich Iglesias MD    Last refill: 11/1/2024    Rx #: [id number]    Hypertension Medications Protocol [id number]/07/2025 12:05 AM   Protocol Details CMP or BMP in past 12 months    Last BP reading less than 140/90    In person appointment or virtual visit in the past 12 mos or appointment in next 3 mos    EGFRCR or GFRNAA > 50    Medication is active on med list        LOV: 2/3/2025  RTC: April 2025   Last Relevant Labs: 1/24/2025  Filled: 11/1/2024 #90 with 0 refills    Future Appointments   Date Time Provider Department Center   4/28/2025  3:00 PM Mich Iglesias MD [facility] [place]   6/2/2025  3:00 PM Fam Dorman MD [facility] [place]

## 2025-02-18 NOTE — TELEPHONE ENCOUNTER
Requesting    Tirzepatide (MOUNJARO) 5 MG/0.5ML Subcutaneous Solution Auto-injector         Sig: Inject 5 mg into the skin once a week.    Disp: 2 mL    Refills: 0    Start: 2/18/2025    Class: Normal    Non-formulary    Last ordered: 1 month ago (1/13/2025) by Mich Iglesias MD    Diabetes Medication Protocol [id number]/18/2025 09:59 AM   Protocol Details Last A1C < 7.5 and within past 6 months    In person appointment or virtual visit in the past 6 mos or appointment in next 3 mos    Microalbumin procedure in past 12 months or taking ACE/ARB    EGFRCR or GFRNAA > 50    GFR in the past 12 months    Medication is active on med list        LOV: 2/3/2025  RTC: April 2025  Last Relevant Labs: 1/24/2025  Filled: 1/13/2025 #2 mL with 0 refills    Future Appointments   Date Time Provider Department Center   3/6/2025  2:30 PM Kevin Stark PA [facility] [place]   4/28/2025  3:00 PM Mich Iglesias MD [facility] [place]   6/2/2025  3:00 PM Fam Dorman MD [facility] [place]

## 2025-02-19 RX ORDER — TIRZEPATIDE 5 MG/.5ML
5 INJECTION, SOLUTION SUBCUTANEOUS WEEKLY
Qty: 2 ML | Refills: 0 | Status: SHIPPED | OUTPATIENT
Start: 2025-02-19

## 2025-02-20 ENCOUNTER — HOSPITAL ENCOUNTER (OUTPATIENT)
Facility: HOSPITAL | Age: 50
Setting detail: HOSPITAL OUTPATIENT SURGERY
Discharge: HOME | End: 2025-02-20
Attending: ANESTHESIOLOGY | Admitting: ANESTHESIOLOGY
Payer: COMMERCIAL

## 2025-02-20 ENCOUNTER — APPOINTMENT (OUTPATIENT)
Dept: GENERAL RADIOLOGY | Facility: HOSPITAL | Age: 50
End: 2025-02-20
Attending: ANESTHESIOLOGY
Payer: COMMERCIAL

## 2025-02-20 VITALS
OXYGEN SATURATION: 97 % | RESPIRATION RATE: 18 BRPM | DIASTOLIC BLOOD PRESSURE: 63 MMHG | TEMPERATURE: 97 F | WEIGHT: 238.63 LBS | BODY MASS INDEX: 34.16 KG/M2 | SYSTOLIC BLOOD PRESSURE: 108 MMHG | HEART RATE: 85 BPM | HEIGHT: 70 IN

## 2025-02-20 PROBLEM — M54.16 LUMBAR RADICULITIS: Status: ACTIVE | Noted: 2025-02-20

## 2025-02-20 LAB — GLUCOSE BLD-MCNC: 104 MG/DL (ref 70–99)

## 2025-02-20 PROCEDURE — 62323 NJX INTERLAMINAR LMBR/SAC: CPT | Performed by: ANESTHESIOLOGY

## 2025-02-20 PROCEDURE — 3E0S33Z INTRODUCTION OF ANTI-INFLAMMATORY INTO EPIDURAL SPACE, PERCUTANEOUS APPROACH: ICD-10-PCS | Performed by: ANESTHESIOLOGY

## 2025-02-20 RX ORDER — LIDOCAINE HYDROCHLORIDE 10 MG/ML
INJECTION, SOLUTION EPIDURAL; INFILTRATION; INTRACAUDAL; PERINEURAL
Status: DISCONTINUED | OUTPATIENT
Start: 2025-02-20 | End: 2025-02-20

## 2025-02-20 RX ORDER — DEXAMETHASONE SODIUM PHOSPHATE 10 MG/ML
INJECTION, SOLUTION INTRAMUSCULAR; INTRAVENOUS
Status: DISCONTINUED | OUTPATIENT
Start: 2025-02-20 | End: 2025-02-20

## 2025-02-20 RX ORDER — SODIUM CHLORIDE 9 MG/ML
INJECTION, SOLUTION INTRAMUSCULAR; INTRAVENOUS; SUBCUTANEOUS
Status: DISCONTINUED | OUTPATIENT
Start: 2025-02-20 | End: 2025-02-20

## 2025-02-20 RX ORDER — NALOXONE HYDROCHLORIDE 0.4 MG/ML
0.08 INJECTION, SOLUTION INTRAMUSCULAR; INTRAVENOUS; SUBCUTANEOUS AS NEEDED
Status: DISCONTINUED | OUTPATIENT
Start: 2025-02-20 | End: 2025-02-20

## 2025-02-20 NOTE — OPERATIVE REPORT
[facility]  Operative Report  2025     Jace Panda Patient Status:  Hospital Outpatient Surgery    10/12/1975 MRN [id number]   Location [facility] Attending No att. providers found   Hosp Day # 0 PCP Mich Iglesias MD     Indication: Jace is a 49 year old male radiculitis    Preoperative Diagnosis:  Lumbar radiculitis [M54.16]    Postoperative Diagnosis: Same as above.    Procedure performed: LUMBAR INTERLAMINAR EPIDURAL INJECTION with local    Anesthesia: Local      EBL: Less than 1 ml.    Procedure Description:  After reviewing the patient's history and performing a focused physical examination, the diagnosis and positive previous diagnostic tests were confirmed and contraindications such as infection and coagulopathy were ruled out.  Following review of allergies and potential side effects and complications, including but not necessarily limited to infection, allergic reaction, local tissue breakdown, nerve injury, post-dural puncture headache and paresis, the patient consented for the procedure.    The patient was brought to the procedure room in prone position.   After sterile prep of the lumbar spine, the L5-S1 interspace was identified with the help of fluoroscopy. Local anesthetic was given by a 25 gauge 1.5 inch needle with 1% lidocaine in that space level.  Thereafter, a 20 gauge Tuohy needle was introduced and advanced under fluoroscopy.  The epidural space was accessed by using loss of resistance to air technique.  The needle position was confirmed with AP and lateral view under fluoroscopy.  Omnipaque 180 was injected in 1 mL volume. A good epidurogram was obtained.  Thereafter, dexamethasone 10 mg with normal saline of 5 mL in total volume of 6 mL was injected through the Tuohy needle.  The needle was flushed with 1 mL lidocaine.  The needle was withdrawn with the stylet intact in situ.  The needle's tip was intact.  The patient tolerated the procedure very  well without significant immediate complication.  The patient's back was cleaned and sterile dressing was applied.  The patient was discharged with an instruction to a responsible adult after discharge criteria were met.  The patient was advised to contact us should any problems happen.  The patient was also informed to go to the emergency room immediately if experiencing severe numbness or weakness in the extremities or experiencing bowel or bladder incontinence.            Complications: None.    Follow up: The patient was followed in the pain clinic as needed basis.          James Kuo MD

## 2025-02-20 NOTE — DISCHARGE INSTRUCTIONS
Home Care Instructions Following Your Pain Procedure     Jace,  It has been a pleasure to have you as our patient. To help you at home, you must follow these general discharge instructions. We will review these with you before you are discharged. It is our hope that you have a complete and uneventful recovery from our procedure.     General Instructions:  What to Expect:  Bandages from your procedure today can be removed when you get home.  Please avoid soaking and/or swimming for 24 hours.  Showering is okay  It is normal to have increased pain symptoms and/or pain at injection site for up to 3-5 days after procedure, you can use heat or ice (20 minutes on 20 minutes off) for comfort.  You may experience some temporary side effects which may include restlessness or insomnia, flushing of the face, or heart palpitations.  Please contact the provider if these symptoms do not resolve within 3-4 days.  Lightheadedness or nausea may occur and should resolve within 24 to 48 hours.  If you develop a headache after treatment, rest, drink fluids (with caffeine, if possible) and take mild over-the-counter pain medication.  If the headache does not improve with the above treatment, contact the physician.  Home Medications:  Resume all previously prescribed medication.  Please avoid taking NSAIDs (Non-Steriodal Anti-Inflammatory Drugs) such as:  Ibuprofen ( Advil, Motrin) Aleve (Naproxen), Diclofenac, Meloxicam for 6 hours after procedure.   If you are on Coumadin (Warfarin) or any other anti-coagulant (or \"blood thinning\") medication such as Plavix (Clopidogrel), Xarelto (Rivaroxaban), Eliquis (Apixaban), Effient (Prasugrel) etc., restart on the following day from the procedure unless otherwise directed by your provider.  If you are a diabetic, please increase the frequency of your glucose monitoring after the procedure as steroids may cause a temporary (2-3 day) increase in your blood sugar.  Contact your primary care  physician if your blood sugar remains elevated as you may require some medication adjustment.  Diet:  Resume your regular diet as tolerated.  Activity:  We recommend that you relax and rest during the rest of your procedure day.  If you feel weakness in your arms or legs do not drive.  Follow-up Appointment  Please schedule a follow-up visit within 3 to 4 weeks after your last procedure date.  Question or Concerns:  Feel free to call our office with any questions or concerns at [phone] (option #2)    Jace  Thank you for coming to [facility] for your procedure.  The nurses try very hard to make sure you receive the best care possible.  Your trust in them as well as us is greatly appreciated.    Thanks so much,   Dr. James Kuo

## 2025-02-20 NOTE — H&P
History & Physical Examination    Patient Name: Jace Panda  MRN: [id number]  CSN: [id number]  YOB: 1975    Pre-Operative Diagnosis:  Lumbar radiculitis [M54.16]    Present Illness: Lumbar radiculitis    ASA: 3  MP class: 1  Sedation: Local      Prescriptions Prior to Admission[1]  No current facility-administered medications for this encounter.       Allergies: Allergies[2]    Past Medical History:    Back problem    Cancer (HCC)    testicular ca    Carpal tunnel syndrome on both sides    hands, R hand repaired through surgery, L hand unrepaired    Cellulitis    arm    Diabetes (HCC)    Elevated blood sugar    Essential hypertension, benign    High blood pressure    Low testosterone    Unspecified essential hypertension    Visual impairment    readers     Past Surgical History:   Procedure Laterality Date    Back surgery  10/01/2011    Lumbar area, treated with needle and heat 10/11    Hernia surgery      umbilical    Orchiectomy   Left 10/01/2016    Herminio Cross    s/p radiation       Other surgical history      R carpal, Neuroplasty Decompression Median Nerve  At Carpal tunnel     Tonsillectomy      As a child    Vasectomy  10/01/2016    Herminio Vasquez     Family History   Problem Relation Age of Onset    Hypertension Father      Social History     Tobacco Use    Smoking status: Every Day     Current packs/day: 1.00     Average packs/day: 1 pack/day for 1 year (1.0 ttl pk-yrs)     Types: Cigarettes    Smokeless tobacco: Never    Tobacco comments:     started again 2020, previously Quit 12/2009 1 1/2 ppd since 1991. Unspecified tabacco product   Substance Use Topics    Alcohol use: Not Currently     Comment: social       SYSTEM Check if Review is Normal Check if Physical Exam is Normal If not normal, please explain:   HEENT [x ] [x ]    NECK & BACK [x ] [x ]    HEART [x ] [x ]    LUNGS [x ] [x ]    ABDOMEN [x ] [x ]    UROGENITAL [x ] [x ]    EXTREMITIES [x ] [x ]    OTHER        [ x ] I have  discussed the risks and benefits and alternatives with the patient/family.  They understand and agree to proceed with plan of care.  [ x ] I have reviewed the History and Physical done within the last 30 days.  Any changes noted above.    James Kuo MD              [1]   Medications Prior to Admission   Medication Sig Dispense Refill Last Dose/Taking    Tirzepatide (MOUNJARO) 5 MG/0.5ML Subcutaneous Solution Auto-injector Inject 5 mg into the skin once a week. 2 mL 0     AMLODIPINE 5 MG Oral Tab TAKE 1 TABLET (5 MG TOTAL) BY MOUTH DAILY. 90 tablet 0     zolpidem 10 MG Oral Tab Take 1 tablet (10 mg total) by mouth nightly. 30 tablet 0     HYDROCHLOROTHIAZIDE 25 MG Oral Tab TAKE 1 TABLET (25 MG TOTAL) BY MOUTH DAILY. 90 tablet 0     CANDESARTAN 32 MG Oral Tab TAKE 1 TABLET BY MOUTH DAILY. 90 tablet 0     PANTOPRAZOLE 40 MG Oral Tab EC TAKE 1 TABLET BY MOUTH BEFORE BREAKFAST (Patient taking differently: Take 1 tablet (40 mg total) by mouth daily as needed.) 90 tablet 0     Testosterone cypionate (DEPOTESTOTERONE) 200 MG/ML Intramuscular Solution Inject 0.5 mL (100 mg total) into the muscle See Admin Instructions. Every 10 days.      [2]   Allergies  Allergen Reactions    Ace Inhibitors Coughing    Hydrocodone OTHER (SEE COMMENTS)     nausea

## 2025-02-21 ENCOUNTER — TELEPHONE (OUTPATIENT)
Dept: PAIN CLINIC | Facility: CLINIC | Age: 50
End: 2025-02-21

## 2025-02-21 NOTE — TELEPHONE ENCOUNTER
Ivelisse called placed to patient for post procedure follow up. Patient stated no questions nor concerns. Pt verbalized understanding to call with any questions or concerns.      Procedure: LESI  Date: 2/20/2025  Follow up Visit Scheduled: 3/6/2025 with Kevin Stark    patient

## 2025-02-26 DIAGNOSIS — G47.00 INSOMNIA, UNSPECIFIED TYPE: ICD-10-CM

## 2025-02-27 RX ORDER — ZOLPIDEM TARTRATE 10 MG/1
10 TABLET ORAL NIGHTLY
Qty: 30 TABLET | Refills: 0 | Status: SHIPPED | OUTPATIENT
Start: 2025-02-27

## 2025-02-27 NOTE — TELEPHONE ENCOUNTER
Requesting   zolpidem 10 MG Oral Tab          Sig: Take 1 tablet (10 mg total) by mouth nightly.    Disp: 30 tablet    Refills: 0    Start: 2/26/2025    Class: Normal    Non-formulary For: Insomnia, unspecified type    To pharmacy: Not to exceed 5 additional fills before 05/10/2025    Last ordered: 1 month ago (1/24/2025) by Mich Iglesias MD    Controlled Substance Medication [id number]/26/2025 08:30 PM    This medication is a controlled substance - forward to provider to refill    Medication is active on med list        LOV: 2/3/2025  RTC: April 2025  Last Relevant Labs: n/a   Filled: 1/24/2025 #30 with 0 refills    Future Appointments   Date Time Provider Department Center   3/6/2025  2:30 PM Kevin Stark PA [facility] [place]   4/28/2025  3:00 PM Mich Iglesias MD [facility] [place]   6/2/2025  3:00 PM Fam Dorman MD [facility] [place]

## 2025-03-06 ENCOUNTER — OFFICE VISIT (OUTPATIENT)
Dept: PAIN CLINIC | Facility: CLINIC | Age: 50
End: 2025-03-06
Payer: COMMERCIAL

## 2025-03-06 VITALS
HEART RATE: 98 BPM | OXYGEN SATURATION: 98 % | WEIGHT: 238 LBS | SYSTOLIC BLOOD PRESSURE: 128 MMHG | DIASTOLIC BLOOD PRESSURE: 84 MMHG | BODY MASS INDEX: 34 KG/M2

## 2025-03-06 DIAGNOSIS — M48.062 SPINAL STENOSIS OF LUMBAR REGION WITH NEUROGENIC CLAUDICATION: Primary | ICD-10-CM

## 2025-03-06 PROCEDURE — 3079F DIAST BP 80-89 MM HG: CPT | Performed by: PHYSICIAN ASSISTANT

## 2025-03-06 PROCEDURE — 3074F SYST BP LT 130 MM HG: CPT | Performed by: PHYSICIAN ASSISTANT

## 2025-03-06 PROCEDURE — 99214 OFFICE O/P EST MOD 30 MIN: CPT | Performed by: PHYSICIAN ASSISTANT

## 2025-03-06 NOTE — PROGRESS NOTES
HPI:   Jace Panda presents with complaints of low back pain with anterior thigh pain and numbness to the knee.    The pain is described as moderate numbness, aching that is intermittent.  The patient’s activity level has increased since last visit.  The pain is worst unrelated to time of day.    Changes in condition/history since last visit: here today for f/u, having had repeat LESI on 2/20/25 (previous on 9/6/22,  6/8/23, 4/2/24, 4/30/24).  States that procedure was well-tolerated, and had no adverse effects.  Overall, reports 50% initial relief, though with time, this has begun to wane.  Wishes to repeat procedure    Last procedure: L3/4 KHADIJAH    Date: 2/20/25    Percentage of relief experienced from the procedure: 50%    Duration of the relief: 2 weeks, after which pain began to return    The following activities will increase the patient’s pain: increased activity     The following activities decrease the patient’s pain: limiting activity     Functional Assessment: Patient reports that they are able to complete all of their ADL's such as eating, bathing, using the toilet, dressing and getting up from a bed or a chair independently.    Current Medications:  Current Outpatient Medications   Medication Sig Dispense Refill    zolpidem 10 MG Oral Tab Take 1 tablet (10 mg total) by mouth nightly. 30 tablet 0    Tirzepatide (MOUNJARO) 5 MG/0.5ML Subcutaneous Solution Auto-injector Inject 5 mg into the skin once a week. 2 mL 0    AMLODIPINE 5 MG Oral Tab TAKE 1 TABLET (5 MG TOTAL) BY MOUTH DAILY. 90 tablet 0    HYDROCHLOROTHIAZIDE 25 MG Oral Tab TAKE 1 TABLET (25 MG TOTAL) BY MOUTH DAILY. 90 tablet 0    CANDESARTAN 32 MG Oral Tab TAKE 1 TABLET BY MOUTH DAILY. 90 tablet 0    PANTOPRAZOLE 40 MG Oral Tab EC TAKE 1 TABLET BY MOUTH BEFORE BREAKFAST (Patient taking differently: Take 1 tablet (40 mg total) by mouth daily as needed.) 90 tablet 0    Testosterone cypionate (DEPOTESTOTERONE) 200 MG/ML Intramuscular Solution  Inject 0.5 mL (100 mg total) into the muscle See Admin Instructions. Every 10 days.          Patient requires assistance with: No assistance required    Reviewed Patient History Dated: 2/20/25 no changes noted    Physical Exam:   There were no vitals taken for this visit.  VAS Pain Score:  6/10  General Appearance: Well developed, well nourished, normal build, independent body habitus, no apparent physical disabilities, well groomed    Neurological Exam: WNL-Orientation to time, place and person, normal mood & effect, normal concentration & attention span  Inspection: non-antalgic, no acute distress  Radiology/Lab Test Reviewed: MRI:  L1-L2:  There is diffuse endplate osteophyte, facet hypertrophy and disc bulge.  There is mild to moderate central canal stenosis.  There is moderate subarticular and foraminal stenosis.   L2-L3:  There is diffuse endplate osteophyte, disc bulge and facet hypertrophy.  There is mild central canal stenosis.  There is moderate left subarticular and foraminal stenosis.  There is mild right subarticular and foraminal stenosis.   L3-L4:  There is diffuse endplate osteophyte, disc bulge and facet hypertrophy.  There is mild to moderate central canal stenosis.  There is moderate left subarticular and foraminal stenosis.  There is mild right subarticular and foraminal stenosis.   L4-L5:  There is diffuse disc bulge, endplate osteophyte and facet hypertrophy.  There is no central canal stenosis.  There is moderate right subarticular and foraminal stenosis.  There is mild left subarticular and foraminal stenosis.   L5-S1:  There is facet hypertrophy.  There is right parasagittal disc protrusion and endplate osteophyte.  There is no significant stenosis of central canal or foramina        Lab Results   Component Value Date    WBC 9.0 01/24/2025    WBC 10.8 12/03/2024    WBC 9.5 07/18/2024   No results found for: \"HEMOGLOBIN\"  Lab Results   Component Value Date    .0 01/24/2025    .0  12/03/2024    .0 07/18/2024     Do you have any known blood/bleeding disorders?  No  Does patient currently take blood thinners?   None  Does patient currently take any antibiotics?   No  Patient educated and verbalized understanding.  Medical Decision Making:   Diagnosis:    Encounter Diagnosis   Name Primary?    Spinal stenosis of lumbar region with neurogenic claudication Yes     Impression: 80% relief with L3-4 epidural steroid injection #2 on 4/30/24, and for 6 months, was pleased with his response.  With time, pain had returned, and did undergo repeat L3-4 KHADIJAH for his known L3-4 stenosis.  Initially, was 50% improved, though it has has begun to wane.  As it took 2 injections in 2024, which is a repeat procedure.  Order placed.    Plan: L3-4 interlaminar epidural steroid injection #2 new series at his earliest convenience, pending insurance approval.  Risk and benefits reviewed in detail.  Will see him back in 2 to 3 weeks after procedure.  If injections become ineffective, would update imaging prior to surgical referral.    No orders of the defined types were placed in this encounter.      Meds & Refills for this Visit:  Requested Prescriptions      No prescriptions requested or ordered in this encounter       Imaging & Consults:  None    The patient indicates understanding of these issues and agrees to the plan.    TANIA Cazares

## 2025-03-06 NOTE — PATIENT INSTRUCTIONS
Refill policies:    Allow 2-3 business days for refills; controlled substances may take longer.  Contact your pharmacy at least 5 days prior to running out of medication and have them send an electronic request or submit request through the “request refill” option in your [employer] account.  Refills are not addressed on weekends; covering physicians do not authorize routine medications on weekends.  No narcotics or controlled substances are refilled after noon on Fridays or by on call physicians.  By law, narcotics must be electronically prescribed.  A 30 day supply with no refills is the maximum allowed.  If your prescription is due for a refill, you may be due for a follow up appointment.  To best provide you care, patients receiving routine medications need to be seen at least once a year.  Patients receiving narcotic/controlled substance medications need to be seen at least once every 3 months.  In the event that your preferred pharmacy does not have the requested medication in stock (e.g. Backordered), it is your responsibility to find another pharmacy that has the requested medication available.  We will gladly send a new prescription to that pharmacy at your request.    Scheduling Tests:    If your physician has ordered radiology tests such as MRI or CT scans, please contact Central Scheduling at [phone] right away to schedule the test.  Once scheduled, the [facility] Centralized Referral Team will work with your insurance carrier to obtain pre-certification or prior authorization.  Depending on your insurance carrier, approval may take 3-10 days.  It is highly recommended patients assure they have received an authorization before having a test performed.  If test is done without insurance authorization, patient may be responsible for the entire amount billed.      Precertification and Prior Authorizations:  If your physician has recommended that you have a procedure or additional testing performed the [facility]  Centralized Referral Team will contact your insurance carrier to obtain pre-certification or prior authorization.    You are strongly encouraged to contact your insurance carrier to verify that your procedure/test has been approved and is a COVERED benefit.  Although the [facility] Centralized Referral Team does its due diligence, the insurance carrier gives the disclaimer that \"Although the procedure is authorized, this does not guarantee payment.\"    Ultimately the patient is responsible for payment.   Thank you for your understanding in this matter.  Paperwork Completion:  If you require FMLA or disability paperwork for your recovery, please make sure to either drop it off or have it faxed to our office at [phone]. Be sure the form has your name and date of birth on it.  The form will be faxed to our Forms Department and they will complete it for you.  There is a 25$ fee for all forms that need to be filled out.  Please be aware there is a 10-14 day turnaround time.  You will need to sign a release of information (LAVELLE) form if your paperwork does not come with one.  You may call the Forms Department with any questions at [phone].  Their fax number is [phone].

## 2025-03-06 NOTE — PROGRESS NOTES
Last procedure: LESI  Date: 2/20/25    Percentage of relief obtained: 20 percent  Duration of relief: Still relief today    Current Pain Score: 3/10

## 2025-03-10 ENCOUNTER — TELEPHONE (OUTPATIENT)
Dept: PAIN CLINIC | Facility: CLINIC | Age: 50
End: 2025-03-10

## 2025-03-10 DIAGNOSIS — M54.16 LUMBAR RADICULITIS: Primary | ICD-10-CM

## 2025-03-10 NOTE — TELEPHONE ENCOUNTER
Prior authorization request completed for: LESCATALINO (L3/4)  Authorization #no auth needed  Pre-D: no  Exclusions/Restrictions: no  Covered Benefit: yes  Authorization dates: n/a  CPT codes approved: 78391  Number of visits/dates of service approved: 1  Physician: rian  Location: [facility]   Call Ref#: [id number]  Representative Name: major adams  Insurance Carrier:  [phone]    Patient can be scheduled. Routed to Navigator.

## 2025-03-11 NOTE — TELEPHONE ENCOUNTER
Patient advised of insurance approval to proceed with injections and is agreeable to scheduling. pre-procedure instructions reviewed. Patient prefers Local sedation. Reviewed sedation instructions including No Fasting & No  Required. Patient encouraged but not required to hold Advil/NSAIDs for 24 hours prior to procedure. Patient verbalized understanding of instructions, no further needs at this time.     [facility]  PRE-PROCEDURE INSTRUCTIONS WITHOUT SEDATION    Procedure: LESI       Appointment Date: 03/20/2025  Check-In Time: 01:45 PM    Follow-Up Date/Time: 04/03/2025 @ 02:30 PM    Prior to the procedure:  Please update us prior to the procedure if you are experiencing any symptoms of infection such as cough, fever, chills, urinary symptoms, or have recently been prescribed antibiotics, have open wounds, have recently had surgery or dental procedures.    Day of Procedure:  **Drivers will be required for patients who receive prescriptions for Valium.    NO FASTING REQUIRED  Please bring your Insurance Card, Photo ID, List of Current Medications and Referral (if applicable) to your appointment.  Please park in the [facility] e-contratosage and follow the signs to the [facility].  Check in at [facility] ([address] [place] [address]) outpatient registration in the [facility].  Please note-No prescriptions will be written by Pain Clinic in [place] on the day of procedure. If you require a refill of medications, please contact the office 48 hours prior to your procedure.  If you have an implanted Spinal Cord or Peripheral Nerve Stimulator: Please remember to turn device off for procedure.        Medication Hold:    Number of days you need to be off for the following medications:    Aggrenox 10 days   Agrylin (Anagrelide) 10 days  Brilinta (Ticagrelor) 7 days  Imbruvica (Ibrutinib) 3 days   Enbrel (Etanercept) 24 hours   Fragmin (Dalteparin) 24 hours   Pletal (Cilostazol) 7 days  Effient  (Prasugrel) 7 days  Pradaxa 10 days  Trental 7 days  Eliquis (Apixaban) 3 days  Xarelto (Rivaroxaban) 3 days  Lovenox (Enoxaparin) 24 hours  Aspirin  Greater than 81mg but less than 325mg   5 days  325mg and greater                  7 days  Coumadin       5 days  Procedure may be cancelled if INR is elevated.   Excedrin (with aspirin) 7 days  Plavix (Clopidogrel)                            7 days    NSAIDs: 24 hours preferred      Ibuprofen (Motrin, Advil, Vicoprofen), Naproxen (Naprosyn, Aleve), Piroxcam (Feldene), Meloxicam (Mobic), Oxaprozin (Daypro), Diclofenac (Voltaren), Indomethacin (Indocin), Etodolac (Lodine), Nabumetone (Relafen), Celebrex (Celecoxib)           HERBAL SUPPLEMENTS  5 days preferred  Fish oil, krill oil, Omega-3, Vascepa, Vitamin E, Turmeric, Garlic                       Insurance Authorization:   Most insurances are now requiring a preauthorization for all procedures.  In the event that your insurance does not authorize your procedure within 48 hours of the scheduled date, your procedure will be cancelled and rescheduled to a later date.  Please contact your insurance carrier to determine what your financial responsibility will be for the procedure(s).      Cancellation/Rescheduling Appointment:   In the event you need to cancel or reschedule your appointment, you must notify the office 24 hours prior.    Post-procedure instructions:        Please schedule a follow up visit within 2 to 4 weeks after your last procedure date   Please call our office with any questions or concerns before or after your procedure at  [phone].  If you are a diabetic, please increase the frequency of your glucose monitoring after the procedure as this may cause a temporary increase in your blood sugar.  Contact your primary care physician if your blood sugar rises as you may require some medication adjustment.  It is normal to have increased pain at injection site for up to 3-5 days after procedure, you can  use heat or ice (20 minutes on 20 minutes off) for comfort.    **To hear a recorded version of these instructions, please call [phone] and follow the prompts.  **Para escuchar las instrucciones en Español, por favor de llamar el loan [phone] opción 4.

## 2025-03-18 NOTE — TELEPHONE ENCOUNTER
Tirzepatide (MOUNJARO) 5 MG/0.5ML Subcutaneous Solution Auto-injector         Sig: Inject 5 mg into the skin once a week.    Disp: 2 mL    Refills: 0    Start: 3/18/2025    Class: Normal    Non-formulary    Last ordered: 3 weeks ago (2/19/2025) by Mich Iglesias MD    Diabetes Medication Protocol [id number]/18/2025 03:31 PM   Protocol Details Last A1C < 7.5 and within past 6 months    In person appointment or virtual visit in the past 6 mos or appointment in next 3 mos    Microalbumin procedure in past 12 months or taking ACE/ARB    EGFRCR or GFRNAA > 50    GFR in the past 12 months    Medication is active on med list      To be filled at: [id number]  [place] - [address] AT [facility] [address], [phone], [phone]

## 2025-03-19 RX ORDER — TIRZEPATIDE 5 MG/.5ML
5 INJECTION, SOLUTION SUBCUTANEOUS WEEKLY
Qty: 2 ML | Refills: 0 | Status: SHIPPED | OUTPATIENT
Start: 2025-03-19

## 2025-03-20 ENCOUNTER — APPOINTMENT (OUTPATIENT)
Dept: GENERAL RADIOLOGY | Facility: HOSPITAL | Age: 50
End: 2025-03-20
Attending: ANESTHESIOLOGY
Payer: COMMERCIAL

## 2025-03-20 ENCOUNTER — HOSPITAL ENCOUNTER (OUTPATIENT)
Facility: HOSPITAL | Age: 50
Setting detail: HOSPITAL OUTPATIENT SURGERY
Discharge: HOME | End: 2025-03-20
Attending: ANESTHESIOLOGY | Admitting: ANESTHESIOLOGY
Payer: COMMERCIAL

## 2025-03-20 VITALS
OXYGEN SATURATION: 96 % | RESPIRATION RATE: 18 BRPM | HEART RATE: 82 BPM | DIASTOLIC BLOOD PRESSURE: 69 MMHG | BODY MASS INDEX: 34.07 KG/M2 | WEIGHT: 238 LBS | TEMPERATURE: 99 F | SYSTOLIC BLOOD PRESSURE: 120 MMHG | HEIGHT: 70 IN

## 2025-03-20 LAB — GLUCOSE BLD-MCNC: 111 MG/DL (ref 70–99)

## 2025-03-20 PROCEDURE — 62323 NJX INTERLAMINAR LMBR/SAC: CPT | Performed by: ANESTHESIOLOGY

## 2025-03-20 PROCEDURE — 3E0S33Z INTRODUCTION OF ANTI-INFLAMMATORY INTO EPIDURAL SPACE, PERCUTANEOUS APPROACH: ICD-10-PCS | Performed by: ANESTHESIOLOGY

## 2025-03-20 RX ORDER — LIDOCAINE HYDROCHLORIDE 10 MG/ML
INJECTION, SOLUTION EPIDURAL; INFILTRATION; INTRACAUDAL; PERINEURAL
Status: DISCONTINUED | OUTPATIENT
Start: 2025-03-20 | End: 2025-03-20

## 2025-03-20 RX ORDER — NALOXONE HYDROCHLORIDE 0.4 MG/ML
0.08 INJECTION, SOLUTION INTRAMUSCULAR; INTRAVENOUS; SUBCUTANEOUS AS NEEDED
Status: DISCONTINUED | OUTPATIENT
Start: 2025-03-20 | End: 2025-03-20

## 2025-03-20 RX ORDER — DEXAMETHASONE SODIUM PHOSPHATE 10 MG/ML
INJECTION, SOLUTION INTRAMUSCULAR; INTRAVENOUS
Status: DISCONTINUED | OUTPATIENT
Start: 2025-03-20 | End: 2025-03-20

## 2025-03-20 RX ORDER — SODIUM CHLORIDE 9 MG/ML
INJECTION, SOLUTION INTRAMUSCULAR; INTRAVENOUS; SUBCUTANEOUS
Status: DISCONTINUED | OUTPATIENT
Start: 2025-03-20 | End: 2025-03-20

## 2025-03-20 NOTE — DISCHARGE INSTRUCTIONS
Home Care Instructions Following Your Pain Procedure     Jcae,  It has been a pleasure to have you as our patient. To help you at home, you must follow these general discharge instructions. We will review these with you before you are discharged. It is our hope that you have a complete and uneventful recovery from our procedure.     General Instructions:  What to Expect:  Bandages from your procedure today can be removed when you get home.  Please avoid soaking and/or swimming for 24 hours.  Showering is okay  It is normal to have increased pain symptoms and/or pain at injection site for up to 3-5 days after procedure, you can use heat or ice (20 minutes on 20 minutes off) for comfort.  You may experience some temporary side effects which may include restlessness or insomnia, flushing of the face, or heart palpitations.  Please contact the provider if these symptoms do not resolve within 3-4 days.  Lightheadedness or nausea may occur and should resolve within 24 to 48 hours.  If you develop a headache after treatment, rest, drink fluids (with caffeine, if possible) and take mild over-the-counter pain medication.  If the headache does not improve with the above treatment, contact the physician.  Home Medications:  Resume all previously prescribed medication.  Please avoid taking NSAIDs (Non-Steriodal Anti-Inflammatory Drugs) such as:  Ibuprofen ( Advil, Motrin) Aleve (Naproxen), Diclofenac, Meloxicam for 6 hours after procedure.   If you are on Coumadin (Warfarin) or any other anti-coagulant (or \"blood thinning\") medication such as Plavix (Clopidogrel), Xarelto (Rivaroxaban), Eliquis (Apixaban), Effient (Prasugrel) etc., restart on the following day from the procedure unless otherwise directed by your provider.  If you are a diabetic, please increase the frequency of your glucose monitoring after the procedure as steroids may cause a temporary (2-3 day) increase in your blood sugar.  Contact your primary care  physician if your blood sugar remains elevated as you may require some medication adjustment.  Diet:  Resume your regular diet as tolerated.  Activity:  We recommend that you relax and rest during the rest of your procedure day.  If you feel weakness in your arms or legs do not drive.  Follow-up Appointment  Please schedule a follow-up visit within 3 to 4 weeks after your last procedure date.  Question or Concerns:  Feel free to call our office with any questions or concerns at [phone] (option #2)    Jace  Thank you for coming to [facility] for your procedure.  The nurses try very hard to make sure you receive the best care possible.  Your trust in them as well as us is greatly appreciated.    Thanks so much,   Dr. James Kuo

## 2025-03-20 NOTE — OPERATIVE REPORT
[facility]  Operative Report  3/20/2025     Jace Panda Patient Status:  Hospital Outpatient Surgery    10/12/1975 MRN [id number]   Location [facility] Attending James Kuo MD   Hosp Day # 0 PCP Mich Iglesias MD     Indication: Jace is a 49 year old male lumbar radiculitis    Preoperative Diagnosis:  Lumbar radiculitis [M54.16]    Postoperative Diagnosis: Same as above.    Procedure performed: LUMBAR INTERLAMINAR EPIDURAL INJECTION with local    Anesthesia: Local      EBL: Less than 1 ml.    Procedure Description:  After reviewing the patient's history and performing a focused physical examination, the diagnosis and positive previous diagnostic tests were confirmed and contraindications such as infection and coagulopathy were ruled out.  Following review of allergies and potential side effects and complications, including but not necessarily limited to infection, allergic reaction, local tissue breakdown, nerve injury, post-dural puncture headache and paresis, the patient consented for the procedure.    The patient was brought to the procedure room in prone position.  After sterile prep of the lumbar spine, the L4-L5 interspace was identified with the help of fluoroscopy. Local anesthetic was given by a 25 gauge 1.5 inch needle with 1% lidocaine in that space level.  Thereafter, a 20 gauge Tuohy needle was introduced and advanced under fluoroscopy.  The epidural space was accessed by using loss of resistance to air technique.  The needle position was confirmed with AP and lateral view under fluoroscopy.  Omnipaque 180 was injected in 1 mL volume. A good epidurogram was obtained.  Thereafter, dexamethasone 10 mg with normal saline of 5 mL in total volume of 6 mL was injected through the Tuohy needle.  The needle was flushed with 1 mL lidocaine.  The needle was withdrawn with the stylet intact in situ.  The needle's tip was intact.  The patient tolerated the procedure  very well without significant immediate complication.  The patient's back was cleaned and sterile dressing was applied.  The patient was discharged with an instruction to a responsible adult after discharge criteria were met.  The patient was advised to contact us should any problems happen.  The patient was also informed to go to the emergency room immediately if experiencing severe numbness or weakness in the extremities or experiencing bowel or bladder incontinence.            Complications: None.    Follow up: The patient was followed in the pain clinic as needed basis.          James Kuo MD

## 2025-03-20 NOTE — H&P
History & Physical Examination    Patient Name: Jace Panda  MRN: [id number]  CSN: [id number]  YOB: 1975    Pre-Operative Diagnosis:  Lumbar radiculitis [M54.16]    Present Illness: Radicular    ASA: 2  MP class: 1  Sedation: Local      Prescriptions Prior to Admission[1]  No current facility-administered medications for this encounter.       Allergies: Allergies[2]    Past Medical History:    Back problem    Cancer (HCC)    testicular ca    Carpal tunnel syndrome on both sides    hands, R hand repaired through surgery, L hand unrepaired    Cellulitis    arm    Diabetes (HCC)    Elevated blood sugar    Essential hypertension, benign    High blood pressure    Low testosterone    Unspecified essential hypertension    Visual impairment    readers     Past Surgical History:   Procedure Laterality Date    Back surgery  10/01/2011    Lumbar area, treated with needle and heat 10/11    Hernia surgery      umbilical    Orchiectomy   Left 10/01/2016    Herminio Cross    s/p radiation       Other surgical history      R carpal, Neuroplasty Decompression Median Nerve  At Carpal tunnel     Tonsillectomy      As a child    Vasectomy  10/01/2016    Herminio Vasquez     Family History   Problem Relation Age of Onset    Hypertension Father      Social History     Tobacco Use    Smoking status: Every Day     Current packs/day: 1.00     Average packs/day: 1 pack/day for 1 year (1.0 ttl pk-yrs)     Types: Cigarettes    Smokeless tobacco: Never    Tobacco comments:     started again 2020, previously Quit 12/2009 1 1/2 ppd since 1991. Unspecified tabacco product   Substance Use Topics    Alcohol use: Not Currently     Comment: social       SYSTEM Check if Review is Normal Check if Physical Exam is Normal If not normal, please explain:   HEENT [x ] [x ]    NECK & BACK [x ] [x ]    HEART [x ] [x ]    LUNGS [x ] [x ]    ABDOMEN [x ] [x ]    UROGENITAL [x ] [x ]    EXTREMITIES [x ] [x ]    OTHER        [ x ] I have discussed the  risks and benefits and alternatives with the patient/family.  They understand and agree to proceed with plan of care.  [ x ] I have reviewed the History and Physical done within the last 30 days.  Any changes noted above.    James Kuo MD              [1]   Medications Prior to Admission   Medication Sig Dispense Refill Last Dose/Taking    Tirzepatide (MOUNJARO) 5 MG/0.5ML Subcutaneous Solution Auto-injector Inject 5 mg into the skin once a week. 2 mL 0     zolpidem 10 MG Oral Tab Take 1 tablet (10 mg total) by mouth nightly. 30 tablet 0     AMLODIPINE 5 MG Oral Tab TAKE 1 TABLET (5 MG TOTAL) BY MOUTH DAILY. 90 tablet 0     HYDROCHLOROTHIAZIDE 25 MG Oral Tab TAKE 1 TABLET (25 MG TOTAL) BY MOUTH DAILY. 90 tablet 0     CANDESARTAN 32 MG Oral Tab TAKE 1 TABLET BY MOUTH DAILY. 90 tablet 0     PANTOPRAZOLE 40 MG Oral Tab EC TAKE 1 TABLET BY MOUTH BEFORE BREAKFAST (Patient taking differently: Take 1 tablet (40 mg total) by mouth daily as needed.) 90 tablet 0     Testosterone cypionate (DEPOTESTOTERONE) 200 MG/ML Intramuscular Solution Inject 0.5 mL (100 mg total) into the muscle See Admin Instructions. Every 10 days.      [2]   Allergies  Allergen Reactions    Ace Inhibitors Coughing    Hydrocodone OTHER (SEE COMMENTS)     nausea

## 2025-03-21 ENCOUNTER — TELEPHONE (OUTPATIENT)
Dept: PAIN CLINIC | Facility: CLINIC | Age: 50
End: 2025-03-21

## 2025-03-21 NOTE — TELEPHONE ENCOUNTER
Ivelisse called placed to patient for post procedure follow up. Patient stated he's doing good and has no questions. Pt verbalized understanding to call with any questions or concerns.    Procedure: LESI  Date: 3/20/25  Follow up Visit Scheduled: 4/3/25 @ 2:30 with Kevin

## 2025-04-09 ENCOUNTER — OFFICE VISIT (OUTPATIENT)
Dept: PAIN CLINIC | Facility: CLINIC | Age: 50
End: 2025-04-09
Payer: COMMERCIAL

## 2025-04-09 VITALS
WEIGHT: 238 LBS | DIASTOLIC BLOOD PRESSURE: 78 MMHG | HEART RATE: 84 BPM | OXYGEN SATURATION: 98 % | BODY MASS INDEX: 34 KG/M2 | SYSTOLIC BLOOD PRESSURE: 142 MMHG

## 2025-04-09 DIAGNOSIS — M48.062 SPINAL STENOSIS OF LUMBAR REGION WITH NEUROGENIC CLAUDICATION: Primary | ICD-10-CM

## 2025-04-09 PROCEDURE — 3078F DIAST BP <80 MM HG: CPT | Performed by: PHYSICIAN ASSISTANT

## 2025-04-09 PROCEDURE — 99214 OFFICE O/P EST MOD 30 MIN: CPT | Performed by: PHYSICIAN ASSISTANT

## 2025-04-09 PROCEDURE — 3077F SYST BP >= 140 MM HG: CPT | Performed by: PHYSICIAN ASSISTANT

## 2025-04-09 NOTE — PATIENT INSTRUCTIONS
Refill policies:    Allow 2-3 business days for refills; controlled substances may take longer.  Contact your pharmacy at least 5 days prior to running out of medication and have them send an electronic request or submit request through the “request refill” option in your [employer] account.  Refills are not addressed on weekends; covering physicians do not authorize routine medications on weekends.  No narcotics or controlled substances are refilled after noon on Fridays or by on call physicians.  By law, narcotics must be electronically prescribed.  A 30 day supply with no refills is the maximum allowed.  If your prescription is due for a refill, you may be due for a follow up appointment.  To best provide you care, patients receiving routine medications need to be seen at least once a year.  Patients receiving narcotic/controlled substance medications need to be seen at least once every 3 months.  In the event that your preferred pharmacy does not have the requested medication in stock (e.g. Backordered), it is your responsibility to find another pharmacy that has the requested medication available.  We will gladly send a new prescription to that pharmacy at your request.    Scheduling Tests:    If your physician has ordered radiology tests such as MRI or CT scans, please contact Central Scheduling at [phone] right away to schedule the test.  Once scheduled, the [facility] Centralized Referral Team will work with your insurance carrier to obtain pre-certification or prior authorization.  Depending on your insurance carrier, approval may take 3-10 days.  It is highly recommended patients assure they have received an authorization before having a test performed.  If test is done without insurance authorization, patient may be responsible for the entire amount billed.      Precertification and Prior Authorizations:  If your physician has recommended that you have a procedure or additional testing performed the [facility]  Centralized Referral Team will contact your insurance carrier to obtain pre-certification or prior authorization.    You are strongly encouraged to contact your insurance carrier to verify that your procedure/test has been approved and is a COVERED benefit.  Although the [facility] Centralized Referral Team does its due diligence, the insurance carrier gives the disclaimer that \"Although the procedure is authorized, this does not guarantee payment.\"    Ultimately the patient is responsible for payment.   Thank you for your understanding in this matter.  Paperwork Completion:  If you require FMLA or disability paperwork for your recovery, please make sure to either drop it off or have it faxed to our office at [phone]. Be sure the form has your name and date of birth on it.  The form will be faxed to our Forms Department and they will complete it for you.  There is a 25$ fee for all forms that need to be filled out.  Please be aware there is a 10-14 day turnaround time.  You will need to sign a release of information (LAVELLE) form if your paperwork does not come with one.  You may call the Forms Department with any questions at [phone].  Their fax number is [phone].

## 2025-04-09 NOTE — PROGRESS NOTES
Last procedure: LUMBAR INTERLAMINAR EPIDURAL INJECTION with local   Date: 03/20/25  Percentage of relief obtained: 40%  Duration of relief: current    Current Pain Score: 4-5

## 2025-04-09 NOTE — PROGRESS NOTES
HPI:   Jace Panda presents with complaints of low back pain with anterior thigh pain and numbness to the knee.    The pain is described as moderate numbness, aching that is intermittent.  The patient’s activity level has increased since last visit.  The pain is worst unrelated to time of day.    Changes in condition/history since last visit: here today for f/u, having had repeat LESI on 3/20/25 (previous on 9/6/22,  6/8/23, 4/2/24, 4/30/24, 2/20/25).  States that procedure was well-tolerated, and had no adverse effects.  Overall, reports moderate relief, and is pleased with his response.      Last procedure: L3/4 KHADIJAH    Date: 3/20/25    Percentage of relief experienced from the procedure: 50%    Duration of the relief: sustained     The following activities will increase the patient’s pain: increased activity     The following activities decrease the patient’s pain: limiting activity     Functional Assessment: Patient reports that they are able to complete all of their ADL's such as eating, bathing, using the toilet, dressing and getting up from a bed or a chair independently.    Current Medications:  Current Outpatient Medications   Medication Sig Dispense Refill    Tirzepatide (MOUNJARO) 5 MG/0.5ML Subcutaneous Solution Auto-injector Inject 5 mg into the skin once a week. 2 mL 0    zolpidem 10 MG Oral Tab Take 1 tablet (10 mg total) by mouth nightly. 30 tablet 0    AMLODIPINE 5 MG Oral Tab TAKE 1 TABLET (5 MG TOTAL) BY MOUTH DAILY. 90 tablet 0    HYDROCHLOROTHIAZIDE 25 MG Oral Tab TAKE 1 TABLET (25 MG TOTAL) BY MOUTH DAILY. 90 tablet 0    CANDESARTAN 32 MG Oral Tab TAKE 1 TABLET BY MOUTH DAILY. 90 tablet 0    PANTOPRAZOLE 40 MG Oral Tab EC TAKE 1 TABLET BY MOUTH BEFORE BREAKFAST (Patient taking differently: Take 1 tablet (40 mg total) by mouth daily as needed.) 90 tablet 0    Testosterone cypionate (DEPOTESTOTERONE) 200 MG/ML Intramuscular Solution Inject 0.5 mL (100 mg total) into the muscle See Admin  Instructions. Every 10 days.          Patient requires assistance with: No assistance required    Reviewed Patient History Dated: 3/20/25 no changes noted    Physical Exam:   /78   Pulse 84   Wt 238 lb (108 kg)   SpO2 98%   BMI 34.15 kg/m²   VAS Pain Score:  6/10  General Appearance: Well developed, well nourished, normal build, independent body habitus, no apparent physical disabilities, well groomed    Neurological Exam: WNL-Orientation to time, place and person, normal mood & effect, normal concentration & attention span  Inspection: non-antalgic, no acute distress  Radiology/Lab Test Reviewed: MRI:  L1-L2:  There is diffuse endplate osteophyte, facet hypertrophy and disc bulge.  There is mild to moderate central canal stenosis.  There is moderate subarticular and foraminal stenosis.   L2-L3:  There is diffuse endplate osteophyte, disc bulge and facet hypertrophy.  There is mild central canal stenosis.  There is moderate left subarticular and foraminal stenosis.  There is mild right subarticular and foraminal stenosis.   L3-L4:  There is diffuse endplate osteophyte, disc bulge and facet hypertrophy.  There is mild to moderate central canal stenosis.  There is moderate left subarticular and foraminal stenosis.  There is mild right subarticular and foraminal stenosis.   L4-L5:  There is diffuse disc bulge, endplate osteophyte and facet hypertrophy.  There is no central canal stenosis.  There is moderate right subarticular and foraminal stenosis.  There is mild left subarticular and foraminal stenosis.   L5-S1:  There is facet hypertrophy.  There is right parasagittal disc protrusion and endplate osteophyte.  There is no significant stenosis of central canal or foramina        Lab Results   Component Value Date    WBC 9.0 01/24/2025    WBC 10.8 12/03/2024    WBC 9.5 07/18/2024   No results found for: \"HEMOGLOBIN\"  Lab Results   Component Value Date    .0 01/24/2025    .0 12/03/2024    PLT  225.0 07/18/2024     Do you have any known blood/bleeding disorders?  No  Does patient currently take blood thinners?   None  Does patient currently take any antibiotics?   No  Patient educated and verbalized understanding.  Medical Decision Making:   Diagnosis:    Encounter Diagnosis   Name Primary?    Spinal stenosis of lumbar region with neurogenic claudication Yes       Impression: 50% relief with L3-4 epidural steroid injection #2 on 5/20/25, and is pleased with his response.  At this time, does not feel as though he requires repeat injections.    Plan: Doing well at this time and repeat injection is not required.  If pain rapidly returns would need to update MRI prior to surgical consultation for his known L3-4 stenosis.    No orders of the defined types were placed in this encounter.      Meds & Refills for this Visit:  Requested Prescriptions      No prescriptions requested or ordered in this encounter       Imaging & Consults:  None    The patient indicates understanding of these issues and agrees to the plan.    TANIA Cazares

## 2025-04-16 RX ORDER — TIRZEPATIDE 5 MG/.5ML
5 INJECTION, SOLUTION SUBCUTANEOUS WEEKLY
Qty: 2 ML | Refills: 0 | Status: SHIPPED | OUTPATIENT
Start: 2025-04-16

## 2025-04-16 NOTE — TELEPHONE ENCOUNTER
Mounjaro 5 mg  Filled 3-19-25  Qty 2 mL  0 refills  Future Appointments   Date Time Provider Department Center   4/28/2025  3:00 PM Mich Iglesias MD [facility] [place]   6/2/2025  3:00 PM Fam Dorman MD [facility] [place]   LOV 2-3-25 TO  Labs 1-24-25 A1c

## 2025-04-20 DIAGNOSIS — I10 ESSENTIAL (PRIMARY) HYPERTENSION: ICD-10-CM

## 2025-04-20 DIAGNOSIS — I10 ESSENTIAL HYPERTENSION, BENIGN: ICD-10-CM

## 2025-04-20 RX ORDER — CANDESARTAN 32 MG/1
32 TABLET ORAL DAILY
Qty: 90 TABLET | Refills: 0 | Status: SHIPPED | OUTPATIENT
Start: 2025-04-20

## 2025-04-20 RX ORDER — HYDROCHLOROTHIAZIDE 25 MG/1
25 TABLET ORAL DAILY
Qty: 90 TABLET | Refills: 0 | Status: SHIPPED | OUTPATIENT
Start: 2025-04-20

## 2025-04-24 DIAGNOSIS — G47.00 INSOMNIA, UNSPECIFIED TYPE: ICD-10-CM

## 2025-04-25 RX ORDER — ZOLPIDEM TARTRATE 10 MG/1
10 TABLET ORAL NIGHTLY
Qty: 30 TABLET | Refills: 0 | Status: SHIPPED | OUTPATIENT
Start: 2025-04-25

## 2025-04-25 NOTE — TELEPHONE ENCOUNTER
Requesting    zolpidem 10 MG Oral Tab         Sig: Take 1 tablet (10 mg total) by mouth nightly.    Disp: 30 tablet    Refills: 0    Start: 4/24/2025    Class: Normal    Non-formulary For: Insomnia, unspecified type    To pharmacy: Not to exceed 5 additional fills before 05/10/2025    Last ordered: 1 month ago (2/27/2025) by Mich Iglesias MD    Controlled Substance Medication [id number]/24/2025 09:16 PM    This medication is a controlled substance - forward to provider to refill    Medication is active on med list        LOV: 2/3/2025  RTC: April 2025  Last Relevant Labs: n/a   Filled: 2/27/2025 #30 with 0 refills    Future Appointments   Date Time Provider Department Center   4/28/2025  3:00 PM Mich Iglesias MD [facility] [place]   6/2/2025  3:00 PM Fam Dorman MD [facility] [place]

## 2025-04-28 ENCOUNTER — OFFICE VISIT (OUTPATIENT)
Dept: INTERNAL MEDICINE CLINIC | Facility: CLINIC | Age: 50
End: 2025-04-28
Payer: COMMERCIAL

## 2025-04-28 ENCOUNTER — NURSE ONLY (OUTPATIENT)
Dept: INTERNAL MEDICINE CLINIC | Facility: CLINIC | Age: 50
End: 2025-04-28
Payer: COMMERCIAL

## 2025-04-28 VITALS
TEMPERATURE: 98 F | BODY MASS INDEX: 32.59 KG/M2 | WEIGHT: 227.63 LBS | HEART RATE: 85 BPM | HEIGHT: 70 IN | OXYGEN SATURATION: 98 % | DIASTOLIC BLOOD PRESSURE: 79 MMHG | SYSTOLIC BLOOD PRESSURE: 118 MMHG

## 2025-04-28 DIAGNOSIS — Z12.11 COLON CANCER SCREENING: ICD-10-CM

## 2025-04-28 DIAGNOSIS — I10 ESSENTIAL HYPERTENSION, BENIGN: ICD-10-CM

## 2025-04-28 DIAGNOSIS — E11.9 TYPE 2 DIABETES MELLITUS WITHOUT COMPLICATION, WITHOUT LONG-TERM CURRENT USE OF INSULIN (HCC): Primary | ICD-10-CM

## 2025-04-28 DIAGNOSIS — E11.9 TYPE 2 DIABETES MELLITUS WITHOUT COMPLICATION, WITHOUT LONG-TERM CURRENT USE OF INSULIN (HCC): ICD-10-CM

## 2025-04-28 DIAGNOSIS — M54.16 LUMBAR RADICULITIS: ICD-10-CM

## 2025-04-28 PROCEDURE — 92229 IMG RTA DETC/MNTR DS POC ALY: CPT | Performed by: FAMILY MEDICINE

## 2025-04-28 PROCEDURE — 99214 OFFICE O/P EST MOD 30 MIN: CPT | Performed by: FAMILY MEDICINE

## 2025-04-28 PROCEDURE — 3008F BODY MASS INDEX DOCD: CPT | Performed by: FAMILY MEDICINE

## 2025-04-28 PROCEDURE — 3078F DIAST BP <80 MM HG: CPT | Performed by: FAMILY MEDICINE

## 2025-04-28 PROCEDURE — 3074F SYST BP LT 130 MM HG: CPT | Performed by: FAMILY MEDICINE

## 2025-04-28 PROCEDURE — 2033F EYE IMG VALID W/O RTNOPTHY: CPT | Performed by: FAMILY MEDICINE

## 2025-04-28 NOTE — PROGRESS NOTES
Jace Panda is a 49 year old male.  HPI:   Here for med check   Overall feels well   breathing is good   no CP   still smokes but has cut down   on mounjaro 5    has lost weight    pleased      on BP meds    occ does get lightheaded       To f/u w Dr  for the back   has been getting pain shots and has helped but not as well as before      Check ears   itchy off on for few yrs           Current Medications[1]   Past Medical History[2]   Social History:  Short Social Hx on File[3]     REVIEW OF SYSTEMS:   GENERAL HEALTH: feels well otherwise  SKIN: denies rashes  RESPIRATORY: denies shortness of breath  CARDIOVASCULAR: denies chest pain        EXAM:   /79 (BP Location: Right arm, Patient Position: Sitting, Cuff Size: large)   Pulse 85   Temp 98.4 °F (36.9 °C) (Temporal)   Ht 5' 10\" (1.778 m)   Wt 227 lb 9.6 oz (103.2 kg)   SpO2 98%   BMI 32.66 kg/m²   GENERAL: well developed, well nourished,in no apparent distress  SKIN: no rashes  TMs nl       ASSESSMENT AND PLAN:   1. Type 2 diabetes mellitus without complication, without long-term current use of insulin (HCC)  Doing great   A1c was fine    will kwaku labs in July    f/u then  CPM w Mounjoro 5     ANISHA done   nl      - Microalb/Creat Ratio, Random Urine; Future  - Comp Metabolic Panel (14); Future  - Lipid Panel; Future  - Hemoglobin A1C; Future  - Diabetic Retinopathy Exam  OU - Both Eyes; Future    2. Essential hypertension, benign  Running lower   Stop the hydrochlorothiazide    CPM w other agents    track home #s      - Microalb/Creat Ratio, Random Urine; Future  - Comp Metabolic Panel (14); Future  - Lipid Panel; Future  - Hemoglobin A1C; Future    3. Lumbar radiculitis  To f/u w back center    May need sx     4. Colon cancer screening  FIT ordered   - Occult Blood, Fecal, Immunoassay (Blue cards) [E]; Future       The patient indicates understanding of these issues and agrees to the plan.  .         [1]   Current Outpatient Medications    Medication Sig Dispense Refill    zolpidem 10 MG Oral Tab Take 1 tablet (10 mg total) by mouth nightly. 30 tablet 0    HYDROCHLOROTHIAZIDE 25 MG Oral Tab TAKE 1 TABLET (25 MG TOTAL) BY MOUTH DAILY. 90 tablet 0    CANDESARTAN 32 MG Oral Tab TAKE 1 TABLET BY MOUTH DAILY. 90 tablet 0    Tirzepatide (MOUNJARO) 5 MG/0.5ML Subcutaneous Solution Auto-injector Inject 5 mg into the skin once a week. 2 mL 0    AMLODIPINE 5 MG Oral Tab TAKE 1 TABLET (5 MG TOTAL) BY MOUTH DAILY. 90 tablet 0    PANTOPRAZOLE 40 MG Oral Tab EC TAKE 1 TABLET BY MOUTH BEFORE BREAKFAST (Patient taking differently: Take 1 tablet (40 mg total) by mouth daily as needed.) 90 tablet 0    Testosterone cypionate (DEPOTESTOTERONE) 200 MG/ML Intramuscular Solution Inject 0.5 mL (100 mg total) into the muscle See Admin Instructions. Every 10 days.     [2]   Past Medical History:   Back problem    Cancer (HCC)    testicular ca    Carpal tunnel syndrome on both sides    hands, R hand repaired through surgery, L hand unrepaired    Cellulitis    arm    Diabetes (HCC)    Elevated blood sugar    Essential hypertension, benign    High blood pressure    Low testosterone    Unspecified essential hypertension    Visual impairment    readers   [3]   Social History  Socioeconomic History    Marital status:    Tobacco Use    Smoking status: Every Day     Current packs/day: 1.00     Average packs/day: 1 pack/day for 1 year (1.0 ttl pk-yrs)     Types: Cigarettes    Smokeless tobacco: Never    Tobacco comments:     started again 2020, previously Quit 12/2009 1 1/2 ppd since 1991. Unspecified tabacco product   Vaping Use    Vaping status: Never Used   Substance and Sexual Activity    Alcohol use: Not Currently     Comment: social    Drug use: Yes     Frequency: 1.0 times per week     Types: Cannabis   Other Topics Concern    Caffeine Concern Yes     Comment: 3-4 cans of big red bulls daily and 8 cans of pop daily.     Exercise Yes     Comment: 6x/week.

## 2025-05-14 RX ORDER — TIRZEPATIDE 5 MG/.5ML
5 INJECTION, SOLUTION SUBCUTANEOUS WEEKLY
Qty: 2 ML | Refills: 0 | Status: SHIPPED | OUTPATIENT
Start: 2025-05-14

## 2025-05-14 NOTE — TELEPHONE ENCOUNTER
Requesting    Tirzepatide (MOUNJARO) 5 MG/0.5ML Subcutaneous Solution Auto-injector         Sig: Inject 5 mg into the skin once a week.    Disp: 2 mL    Refills: 0    Start: 5/13/2025    Class: Normal    Non-formulary    Last ordered: 4 weeks ago (4/16/2025) by Mich Iglesias MD    Diabetes Medication Protocol [id number]/13/2025 09:43 PM   Protocol Details Last A1C < 7.5 and within past 6 months    In person appointment or virtual visit in the past 6 mos or appointment in next 3 mos    Microalbumin procedure in past 12 months or taking ACE/ARB    EGFRCR or GFRNAA > 50    GFR in the past 12 months    Medication is active on med list        LOV: 4/28/2025  RTC: July 2025   Last Relevant Labs: 1/24/2025  Filled: 4/16/2025 #2 mL with 0 refills    Future Appointments   Date Time Provider Department Center   6/2/2025  3:00 PM Fam Dorman MD [facility] [place]   7/28/2025  3:00 PM Mich Iglesias MD [facility] [place]

## 2025-05-16 RX ORDER — AMLODIPINE BESYLATE 5 MG/1
5 TABLET ORAL DAILY
Qty: 90 TABLET | Refills: 0 | Status: SHIPPED | OUTPATIENT
Start: 2025-05-16

## 2025-05-16 NOTE — TELEPHONE ENCOUNTER
Requesting    Name from pharmacy: AMLODIPINE BESYLATE 5 MG TAB         Will file in chart as: AMLODIPINE 5 MG Oral Tab    Sig: TAKE 1 TABLET (5 MG TOTAL) BY MOUTH DAILY.    Disp: 90 tablet    Refills: 0 (Pharmacy requested: Not specified)    Start: 5/16/2025    Class: Normal    Non-formulary    Last ordered: 3 months ago (2/7/2025) by Mich Iglesias MD    Last refill: 2/7/2025    Rx #: [id number]    Hypertension Medications Protocol [id number]/16/2025 01:11 AM   Protocol Details CMP or BMP in past 12 months    Last BP reading less than 140/90    In person appointment or virtual visit in the past 12 mos or appointment in next 3 mos    EGFRCR or GFRNAA > 50    Medication is active on med list        LOV: 4/28/2025  RTC: July 2025   Last Relevant Labs: 1/24/2025  Filled: 2/7/2025 #90 with 0 refills    Future Appointments   Date Time Provider Department Center   6/2/2025  3:00 PM Fam Dorman MD [facility] [place]   7/28/2025  3:00 PM Mich Iglesias MD [facility] [place]

## 2025-05-30 DIAGNOSIS — G47.00 INSOMNIA, UNSPECIFIED TYPE: ICD-10-CM

## 2025-06-02 ENCOUNTER — OFFICE VISIT (OUTPATIENT)
Dept: ORTHOPEDICS CLINIC | Facility: CLINIC | Age: 50
End: 2025-06-02

## 2025-06-02 DIAGNOSIS — S46.311S RUPTURE OF RIGHT TRICEPS TENDON, SEQUELA: Primary | ICD-10-CM

## 2025-06-02 PROCEDURE — 99213 OFFICE O/P EST LOW 20 MIN: CPT | Performed by: STUDENT IN AN ORGANIZED HEALTH CARE EDUCATION/TRAINING PROGRAM

## 2025-06-02 RX ORDER — ZOLPIDEM TARTRATE 10 MG/1
10 TABLET ORAL NIGHTLY
Qty: 30 TABLET | Refills: 0 | Status: SHIPPED | OUTPATIENT
Start: 2025-06-02

## 2025-06-02 NOTE — TELEPHONE ENCOUNTER
Requesting    zolpidem 10 MG Oral Tab         Sig: Take 1 tablet (10 mg total) by mouth nightly.    Disp: 30 tablet    Refills: 0    Start: 5/30/2025    Class: Normal    Non-formulary For: Insomnia, unspecified type    To pharmacy: Not to exceed 5 additional fills before 05/10/2025    Last ordered: 1 month ago (4/25/2025) by Mich Iglesias MD    Controlled Substance Medication [id number]/30/2025 08:34 PM    This medication is a controlled substance - forward to provider to refill    Medication is active on med list        LOV: 4/28/2025  RTC: July 2025   Last Relevant Labs: n/a   Filled: 4/25/2025 #30 with 0 refills    Future Appointments   Date Time Provider Department Center   6/2/2025  1:45 PM Fam Dorman MD [facility] [place]   7/28/2025  3:00 PM Mich Iglesias MD [facility] [place]

## 2025-06-09 RX ORDER — TIRZEPATIDE 5 MG/.5ML
5 INJECTION, SOLUTION SUBCUTANEOUS WEEKLY
Qty: 2 ML | Refills: 0 | Status: SHIPPED | OUTPATIENT
Start: 2025-06-09

## 2025-06-09 NOTE — TELEPHONE ENCOUNTER
Requesting    Tirzepatide (MOUNJARO) 5 MG/0.5ML Subcutaneous Solution Auto-injector         Sig: Inject 5 mg into the skin once a week.    Disp: 2 mL    Refills: 0    Start: 6/7/2025    Class: Normal    Non-formulary    Last ordered: 3 weeks ago (5/14/2025) by Mich Iglesias MD    Diabetes Medication Protocol Ulrpmh9306/07/2025 05:32 AM   Protocol Details Last A1C < 7.5 and within past 6 months    In person appointment or virtual visit in the past 6 mos or appointment in next 3 mos    Microalbumin procedure in past 12 months or taking ACE/ARB    EGFRCR or GFRNAA > 50    GFR in the past 12 months    Medication is active on med list           LOV: 4/28/2025  RTC: July 2025   Last Relevant Labs: 1/24/2025  Filled: 5/14/2025 #2 mL with 0 refills    Future Appointments   Date Time Provider Department Center   7/28/2025  3:00 PM Mich Iglesias MD [facility] [place]

## 2025-06-10 ENCOUNTER — PATIENT MESSAGE (OUTPATIENT)
Dept: INTERNAL MEDICINE CLINIC | Facility: CLINIC | Age: 50
End: 2025-06-10

## 2025-06-13 RX ORDER — PANTOPRAZOLE SODIUM 40 MG/1
40 TABLET, DELAYED RELEASE ORAL
Qty: 90 TABLET | Refills: 0 | Status: SHIPPED | OUTPATIENT
Start: 2025-06-13

## 2025-06-13 NOTE — TELEPHONE ENCOUNTER
Requesting    pantoprazole 40 MG Oral Tab EC         Sig: Take 1 tablet (40 mg total) by mouth before breakfast.    Disp: 90 tablet    Refills: 0    Start: 6/13/2025    Class: Normal    Non-formulary    Last ordered: 7 months ago (11/13/2024) by Mich Iglesias MD    Gastrointestional Medication Protocol Cywqzj5806/13/2025 11:33 AM   Protocol Details In person appointment or virtual visit in the past 12 mos or appointment in next 3 mos    Medication is active on med list           LOV: 4/28/2025  RTC: July 2025   Last Relevant Labs: n/a  Filled: 11/13/2024 #90 with 0 refills    Future Appointments   Date Time Provider Department Center   7/28/2025  3:00 PM Mich Iglesias MD [facility] [place]

## 2025-06-23 RX ORDER — AMLODIPINE BESYLATE 5 MG/1
5 TABLET ORAL DAILY
Qty: 90 TABLET | Refills: 0 | Status: SHIPPED | OUTPATIENT
Start: 2025-06-23

## 2025-06-23 NOTE — TELEPHONE ENCOUNTER
Protocol: Passed  Last Office Visit: 4/28/25  Labs: Completed in March  Last Refill: 5/16/25 #90  Return to Clinic:   Future Appointments   Date Time Provider Department Center   7/28/2025  3:00 PM Mich Iglesias MD [facility] [place]

## 2025-07-03 DIAGNOSIS — G47.00 INSOMNIA, UNSPECIFIED TYPE: ICD-10-CM

## 2025-07-03 RX ORDER — ZOLPIDEM TARTRATE 10 MG/1
10 TABLET ORAL NIGHTLY
Qty: 30 TABLET | Refills: 2 | Status: SHIPPED | OUTPATIENT
Start: 2025-07-03

## 2025-07-03 NOTE — TELEPHONE ENCOUNTER
Zolpidem 10 mg  Filled 6-2-25  Qty 30  0 refills  Future Appointments   Date Time Provider Department Center   7/28/2025  3:00 PM Mich Iglesias MD [facility] 4-28-25 TO

## 2025-07-16 RX ORDER — TIRZEPATIDE 5 MG/.5ML
5 INJECTION, SOLUTION SUBCUTANEOUS WEEKLY
Qty: 2 ML | Refills: 0 | Status: SHIPPED | OUTPATIENT
Start: 2025-07-16

## 2025-07-16 NOTE — TELEPHONE ENCOUNTER
Requesting    Tirzepatide (MOUNJARO) 5 MG/0.5ML Subcutaneous Solution Auto-injector         Sig: Inject 5 mg into the skin once a week.    Disp: 2 mL    Refills: 0    Start: 7/15/2025    Class: Normal    Non-formulary    Last ordered: 1 month ago (6/9/2025) by Mich Iglesias MD    Diabetes Medication Protocol Navntq72/15/2025 08:59 PM   Protocol Details Last A1C < 7.5 and within past 6 months    In person appointment or virtual visit in the past 6 mos or appointment in next 3 mos    Microalbumin procedure in past 12 months or taking ACE/ARB    EGFRCR or GFRNAA > 50    GFR in the past 12 months    Medication is active on med list        LOV: 4/28/2025  RTC: July 2025   Last Relevant Labs: 1/24/2025  Filled: 6/9/2025 #2 mL with 0 refills    Future Appointments   Date Time Provider Department Center   7/28/2025  3:00 PM Mich Iglesias MD [facility] [place]

## 2025-07-19 DIAGNOSIS — I10 ESSENTIAL (PRIMARY) HYPERTENSION: ICD-10-CM

## 2025-07-19 DIAGNOSIS — I10 ESSENTIAL HYPERTENSION, BENIGN: ICD-10-CM

## 2025-07-21 RX ORDER — CANDESARTAN 32 MG/1
32 TABLET ORAL DAILY
Qty: 90 TABLET | Refills: 0 | Status: SHIPPED | OUTPATIENT
Start: 2025-07-21

## 2025-07-21 RX ORDER — HYDROCHLOROTHIAZIDE 25 MG/1
25 TABLET ORAL DAILY
Qty: 90 TABLET | Refills: 0 | Status: SHIPPED | OUTPATIENT
Start: 2025-07-21

## 2025-07-21 NOTE — TELEPHONE ENCOUNTER
Requesting    Name from pharmacy: HYDROCHLOROTHIAZIDE 25 MG TAB         Will file in chart as: HYDROCHLOROTHIAZIDE 25 MG Oral Tab    Sig: TAKE 1 TABLET (25 MG TOTAL) BY MOUTH DAILY.    Disp: 90 tablet    Refills: 0 (Pharmacy requested: Not specified)    Start: 7/19/2025    Class: Normal    Non-formulary For: Essential (primary) hypertension    Last ordered: 3 months ago (4/20/2025) by Mich Iglesias MD    Last refill: 4/20/2025    Rx #: [id number]    Hypertension Medications Protocol Pdrxdi9607/19/2025 07:00 AM   Protocol Details CMP or BMP in past 12 months    Last BP reading less than 140/90    In person appointment or virtual visit in the past 12 mos or appointment in next 3 mos    EGFRCR or GFRNAA > 50    Medication is active on med list       Name from pharmacy: CANDESARTAN CILEXETIL 32 MG TB         Will file in chart as: CANDESARTAN 32 MG Oral Tab    Sig: TAKE 1 TABLET BY MOUTH DAILY.    Disp: 90 tablet    Refills: 0 (Pharmacy requested: Not specified)    Start: 7/19/2025    Class: Normal    For: Essential hypertension, benign    Last ordered: 3 months ago (4/20/2025) by Mich Iglesias MD    Last refill: 4/20/2025    Rx #: [id number]    Hypertension Medications Protocol Nieiqx7607/19/2025 07:00 AM   Protocol Details CMP or BMP in past 12 months    Last BP reading less than 140/90    In person appointment or virtual visit in the past 12 mos or appointment in next 3 mos    EGFRCR or GFRNAA > 50    Medication is active on med list        LOV: 4/28/2025  RTC: July 2025   Last Relevant Labs: 1/24/2025  Filled: 4/20/2025 #90 with 0 refills    Future Appointments   Date Time Provider Department Center   7/28/2025  3:00 PM Mich Iglesias MD [facility] [place]

## 2025-07-28 ENCOUNTER — OFFICE VISIT (OUTPATIENT)
Dept: INTERNAL MEDICINE CLINIC | Facility: CLINIC | Age: 50
End: 2025-07-28
Payer: COMMERCIAL

## 2025-07-28 VITALS
WEIGHT: 212.19 LBS | OXYGEN SATURATION: 97 % | BODY MASS INDEX: 30.38 KG/M2 | HEART RATE: 91 BPM | DIASTOLIC BLOOD PRESSURE: 72 MMHG | TEMPERATURE: 98 F | SYSTOLIC BLOOD PRESSURE: 122 MMHG | HEIGHT: 70 IN

## 2025-07-28 DIAGNOSIS — E11.9 TYPE 2 DIABETES MELLITUS WITHOUT COMPLICATION, WITHOUT LONG-TERM CURRENT USE OF INSULIN (HCC): ICD-10-CM

## 2025-07-28 DIAGNOSIS — L98.9 SKIN LESION: ICD-10-CM

## 2025-07-28 DIAGNOSIS — E66.9 OBESITY (BMI 30-39.9): ICD-10-CM

## 2025-07-28 DIAGNOSIS — H00.015 HORDEOLUM EXTERNUM OF LEFT LOWER EYELID: ICD-10-CM

## 2025-07-28 DIAGNOSIS — I10 ESSENTIAL HYPERTENSION, BENIGN: Primary | ICD-10-CM

## 2025-07-28 PROCEDURE — 3078F DIAST BP <80 MM HG: CPT | Performed by: FAMILY MEDICINE

## 2025-07-28 PROCEDURE — 3074F SYST BP LT 130 MM HG: CPT | Performed by: FAMILY MEDICINE

## 2025-07-28 PROCEDURE — 3008F BODY MASS INDEX DOCD: CPT | Performed by: FAMILY MEDICINE

## 2025-07-28 PROCEDURE — 99213 OFFICE O/P EST LOW 20 MIN: CPT | Performed by: FAMILY MEDICINE

## 2025-07-28 RX ORDER — GENTAMICIN SULFATE 3 MG/ML
2 SOLUTION/ DROPS OPHTHALMIC 3 TIMES DAILY
Qty: 5 ML | Refills: 0 | Status: SHIPPED | OUTPATIENT
Start: 2025-07-28

## 2025-07-28 NOTE — PROGRESS NOTES
The following individual(s) verbally consented to be recorded using ambient AI listening technology and understand that they can each withdraw their consent to this listening technology at any point by asking the clinician to turn off or pause the recording:    Patient name: Jace Panda        History of Present Illness  Mr. Jace Panda is a 49 year old male with type 2 diabetes and hypertension who presents for a follow-up on his diabetes management and blood pressure control.    He has been managing his type 2 diabetes with Mounjaro for a year, starting in July of the previous year. At the initiation of Mounjaro, he weighed 281 pounds, and he has since lost 69 pounds, now weighing 212 pounds. His A1c has improved from 7.3% a year and a half ago to 5.9% in January. He attributes his success to the medication, weight loss, and dietary changes. He experiences no side effects from Mounjaro and is currently on a 5 mg dose.    He has a history of diabetic neuropathy, previously experiencing numbness in his toes while walking, which he attributed to high blood sugar levels. However, he notes significant improvement with no current symptoms of neuropathy.    He has a long-standing history of hypertension, having been on blood pressure medication since age 16. He recently restarted hydrochlorothiazide after noticing his blood pressure creeping up to 133/84 mmHg. His family history includes hypertension in his mother and sister.    He mentions a recent sty on his left lower eyelid, which caused swelling but is not currently bothersome.    He plans to complete his lab work and submit a stool card for testing, which he has kept in his gym bag to avoid losing it. He is involved in travel baseball, which has kept him busy on weekends. No chest pain, tightness, or heaviness.    Physical Exam  Vitals:    07/28/25 1457   BP: 122/72   Pulse: 91   Temp: 98 °F (36.7 °C)   TempSrc: Temporal   SpO2: 97%   Weight: 212 lb  3.2 oz (96.3 kg)   Height: 5' 10\" (1.778 m)         Body mass index is 30.45 kg/m².          VITALS: BP- 118/70  MEASUREMENTS: Weight- 212.     Current Medications[1]     GENERAL: well developed, well nourished,in no apparent distress  SKIN: no rashes  HEENT: Sty on left lower eyelid  NECK: supple,no adenopathy  LUNGS: clear to auscultation  CARDIO: RRR without murmur  EXTREMITIES: Bilateral barefoot skin diabetic exam is normal, visualized feet and the appearance is normal.  Bilateral monofilament/sensation of both feet is normal.  Pulsation pedal pulse exam of both lower legs/feet is normal as well.      Results  LABS  A1c: 5.9 (01/2025)  A1c: 6.4 (07/2024)  A1c: 7.3 (01/2024)    Assessment & Plan  Type 2 Diabetes Mellitus  Tirzepatide led to significant weight loss and improved glycemic control. A1c improved to 5.9%. Neuropathy symptoms improved. No side effects reported.  - Continue Tirzepatide (Mounjaro) 5 MG/0.5ML Subcutaneous once a week.  - Encourage continued weight management and healthy eating habits.  - Obtain blood work including A1c before the next visit.    Hypertension  Blood pressure well-controlled at 118/70 mmHg with Hydrochlorothiazide. Genetic predisposition noted. Medication required despite weight loss.  - Continue Hydrochlorothiazide 25 MG Oral daily.    Stye  Pus-filled sac on lower left eyelid consistent with stye. Swelling present, no significant discomfort.  - Prescribe antibiotic eye drops, two drops three times a day for five days.    General Health Maintenance  Due for routine blood work and stool card test. Completed eye exam in April. Interested in dermatology check-up for skin cancer screening.  - Complete blood work and stool card test.  - Encourage scheduling a dermatology check-up for skin cancer screening.  Dr Isidro              [1]    HYDROCHLOROTHIAZIDE 25 MG Oral Tab TAKE 1 TABLET (25 MG TOTAL) BY MOUTH DAILY. 90 tablet 0    CANDESARTAN 32 MG Oral Tab TAKE 1 TABLET BY MOUTH  DAILY. 90 tablet 0    Tirzepatide (MOUNJARO) 5 MG/0.5ML Subcutaneous Solution Auto-injector Inject 5 mg into the skin once a week. 2 mL 0    zolpidem 10 MG Oral Tab Take 1 tablet (10 mg total) by mouth nightly. 30 tablet 2    amLODIPine 5 MG Oral Tab Take 1 tablet (5 mg total) by mouth daily. 90 tablet 0    pantoprazole 40 MG Oral Tab EC Take 1 tablet (40 mg total) by mouth before breakfast. 90 tablet 0    Testosterone cypionate (DEPOTESTOTERONE) 200 MG/ML Intramuscular Solution Inject 0.5 mL (100 mg total) into the muscle See Admin Instructions. Every 10 days.

## 2025-08-11 RX ORDER — TIRZEPATIDE 5 MG/.5ML
5 INJECTION, SOLUTION SUBCUTANEOUS WEEKLY
Qty: 2 ML | Refills: 0 | Status: SHIPPED | OUTPATIENT
Start: 2025-08-11

## (undated) DIAGNOSIS — R73.03 PRE-DIABETES: ICD-10-CM

## (undated) DIAGNOSIS — Z00.00 ROUTINE GENERAL MEDICAL EXAMINATION AT A HEALTH CARE FACILITY: Primary | ICD-10-CM

## (undated) DIAGNOSIS — I10 ESSENTIAL HYPERTENSION, BENIGN: ICD-10-CM

## (undated) DEVICE — 3M™ COBAN™ NL STERILE NON-LATEX SELF-ADHERENT WRAP, 2084S, 4 IN X 5 YD (10 CM X 4,5 M), 18 ROLLS/CASE: Brand: 3M™ COBAN™

## (undated) DEVICE — GAMMEX® PI HYBRID SIZE 7, STERILE POWDER-FREE SURGICAL GLOVE, POLYISOPRENE AND NEOPRENE BLEND: Brand: GAMMEX

## (undated) DEVICE — SYRINGE 10ML SLIP TIP LOSS OF RESIST PLAS

## (undated) DEVICE — AVANOS* TUOHY EPIDURAL NEEDLE: Brand: AVANOS

## (undated) DEVICE — REMOVER PREP SOLUTION 4OZ

## (undated) DEVICE — PACK CDS UPPER EXTREMITY

## (undated) DEVICE — PAIN TRAY: Brand: MEDLINE INDUSTRIES, INC.

## (undated) DEVICE — DRAPE,U/ SHT,SPLIT,PLAS,STERIL: Brand: MEDLINE

## (undated) DEVICE — SOLUTION IRRIG 1000ML 0.9% NACL USP BTL

## (undated) DEVICE — SKIN MARKER DUAL TIP WITH RULER CAP AND LABELS: Brand: DEVON

## (undated) DEVICE — Device

## (undated) DEVICE — GLOVE SUR 7.5 SENSICARE PIP WHT PWD F

## (undated) DEVICE — GLOVE SUR 6.5 SENSICARE PIP WHT PWD F

## (undated) DEVICE — INTENDED FOR TISSUE SEPARATION, AND OTHER PROCEDURES THAT REQUIRE A SHARP SURGICAL BLADE TO PUNCTURE OR CUT.: Brand: BARD-PARKER ® STAINLESS STEEL BLADES

## (undated) DEVICE — BNDG,ELSTC,MATRIX,STRL,6"X5YD,LF,HOOK&LP: Brand: MEDLINE

## (undated) DEVICE — SLING ARM L L20IN D7IN DK BLU COT POLY WIDE

## (undated) DEVICE — APPLICATOR PREP 26ML CHG 2% ISO ALC 70%

## (undated) DEVICE — GLOVE SURG SENSICARE SZ 6-1/2

## (undated) DEVICE — BANDAGE ADH 1INX3IN NAT FAB N ADH PD CURAD

## (undated) DEVICE — SUT ETHLN 3-0 18IN PS-2 NABSRB BLK 19MM 3/8 C

## (undated) DEVICE — SUT ETHBND XL 2 30IN V-37 NABSRB GRN 40MM 1/2

## (undated) DEVICE — ANTISEPTIC 4OZ 3% H2O2 FOR CUT ABRASION BRN

## (undated) DEVICE — SPLINT ONESTEP 4X30IN FBRGLS MOLD

## (undated) DEVICE — MEDI-VAC NON-CONDUCTIVE SUCTION TUBING: Brand: CARDINAL HEALTH

## (undated) DEVICE — GLOVE SURG SENSICARE SZ 7-1/2

## (undated) DEVICE — SUT SUTTAPE L40IN BLK WHT L36.6MM 1/2

## (undated) DEVICE — SKIN REG/FINE DUAL MARKER, RULER, LABELS: Brand: MEDLINE

## (undated) DEVICE — BANDAID CURAD 3IN X 1IN

## (undated) DEVICE — REMOVER LOT 4OZ N IRRIG UNSCNT SFT MOIST LIQ

## (undated) DEVICE — 3M™ STERI-STRIP™ REINFORCED ADHESIVE SKIN CLOSURES, R1547, 1/2 IN X 4 IN (12 MM X 100 MM), 6 STRIPS/ENVELOPE: Brand: 3M™ STERI-STRIP™

## (undated) DEVICE — BANDAGE ADH W1INXL3IN NAT FAB WVN N ADH PD

## (undated) DEVICE — VIOLET BRAIDED (POLYGLACTIN 910), SYNTHETIC ABSORBABLE SUTURE: Brand: COATED VICRYL

## (undated) DEVICE — SPONGE LAP 18X18IN WHT COT 4 PLY FLD STRUNG

## (undated) DEVICE — 3M™ IOBAN™ 2 ANTIMICROBIAL INCISE DRAPE 6650EZ: Brand: IOBAN™ 2

## (undated) DEVICE — SPONGE 4X4 10PK

## (undated) DEVICE — COVER,MAYO STAND,STERILE: Brand: MEDLINE

## (undated) DEVICE — COTTON UNDERCAST PADDING,REGULAR FINISH: Brand: WEBRIL

## (undated) DEVICE — GAMMEX® PI HYBRID SIZE 7.5, STERILE POWDER-FREE SURGICAL GLOVE, POLYISOPRENE AND NEOPRENE BLEND: Brand: GAMMEX

## (undated) DEVICE — SUT MCRYL 3-0 18IN PS-2 ABSRB UD 19MM 3/8 CIR

## (undated) DEVICE — CABLE BPLR L12FT FLYING LD DISP

## (undated) DEVICE — LEUKOPLAST ELASTIC STERILE 25X75MM TAN 100 1"X3": Brand: LEUKOPLAST®

## (undated) DEVICE — SUT SUTTAPE FIBERLINK N ABSRB L1.3MM

## (undated) DEVICE — INTENT TO BE USED WITH SUTURE MATERIAL FOR TISSUE CLOSURE: Brand: RICHARD-ALLAN® NEEDLE 3/8 CIRCLE TROCAR

## (undated) DEVICE — SUT TIGERLINK SUTTAPE 1.3MM WHT/BLK CLS

## (undated) DEVICE — ANTIBACTERIAL UNDYED BRAIDED (POLYGLACTIN 910), SYNTHETIC ABSORBABLE SUTURE: Brand: COATED VICRYL

## (undated) NOTE — LETTER
12/07/18        Jace Rosales.   [place] 57      Dear Kenan Pacheco,    [id number] [facility] records indicate that you have outstanding lab work and or testing that was ordered for you and has not yet been completed:  Orders Placed This Encounter

## (undated) NOTE — LETTER
Date: 2/6/2025    Patient Name: Jace Panda          To Whom it may concern:    The above patient was seen at [facility] for treatment of a medical condition.      The patient may return to work on 02/17/2025 with no restrictions.        Sincerely,

## (undated) NOTE — MR AVS SNAPSHOT
[facility]  [address]  [place] [address]  [phone]               Thank you for choosing us for your health care visit with Re Huddleston MD.  We are glad to serve you and happy to provide you with this sum Commonly known as:  ZOLOFT           Testosterone cypionate 200 MG/ML Soln   Inject 200 mg into the muscle See Admin Instructions. Every 10 days.    Commonly known as:  DEPOTESTOTERONE           Zolpidem Tartrate 10 MG Tabs   TAKE 1 TABLET BY MOUTH NIGHTLY Choose whole grain products Foods high in sodium   Water is best for hydration Fast food.    Eat at home when possible     Tips for increasing your physical activity – Adults who are physically active are less likely to develop some chronic diseases than ad

## (undated) NOTE — LETTER
03/10/22        Jace Mead. [place] 57      Dear Augusto Beckman,    [address] [facility] records indicate that you have outstanding lab work and or testing that was ordered for you and has not yet been completed:  Orders Placed This Encounter      Hemoglobin A1C      Lipid Panel      Comp Metabolic Panel (14)      CBC With Differential With Platelet    To provide you with the best possible care, please complete these orders at your earliest convenience. If you have recently completed these orders please disregard this letter. If you have any questions please call the office at Dept: [phone].      Thank you,       Cyn Price MD

## (undated) NOTE — MR AVS SNAPSHOT
[place]  [address]  [address] [facility] [address] [phone]               Thank you for choosing us for your health care visit with Justin Gonzalez MD.  We are glad to serve you and happy to provide you with this sum If you've recently had a stay at the Hospital you can access your discharge instructions in [facility] by going to Visits < Admission Summaries.  If you've been to the Emergency Department or your doctor's office, you can view your past visit information in My

## (undated) NOTE — LETTER
12/07/18        Jace Huston.   [place] 57      Dear Ronda Blood,    [address] [facility] records indicate that you have outstanding lab work and or testing that was ordered for you and has not yet been completed:  Orders Placed This Encounter